# Patient Record
Sex: MALE | Race: WHITE | NOT HISPANIC OR LATINO | URBAN - METROPOLITAN AREA
[De-identification: names, ages, dates, MRNs, and addresses within clinical notes are randomized per-mention and may not be internally consistent; named-entity substitution may affect disease eponyms.]

---

## 2023-08-18 ENCOUNTER — HOSPITAL ENCOUNTER (OUTPATIENT)
Dept: DATA CONVERSION | Facility: HOSPITAL | Age: 58
Discharge: HOME | End: 2023-08-18
Payer: MEDICARE

## 2023-08-18 DIAGNOSIS — M25.572 PAIN IN LEFT ANKLE AND JOINTS OF LEFT FOOT: ICD-10-CM

## 2023-08-24 PROBLEM — E78.2 MIXED HYPERLIPIDEMIA: Status: ACTIVE | Noted: 2023-08-24

## 2023-08-24 PROBLEM — T78.40XA ALLERGIC REACTION: Status: ACTIVE | Noted: 2023-08-24

## 2023-08-24 PROBLEM — S92.919A CLOSED FRACTURE OF PHALANX OF FOOT: Status: ACTIVE | Noted: 2023-08-24

## 2023-08-24 PROBLEM — J45.909 ASTHMA (HHS-HCC): Status: ACTIVE | Noted: 2023-08-24

## 2023-08-24 PROBLEM — G56.21 CUBITAL TUNNEL SYNDROME ON RIGHT: Status: ACTIVE | Noted: 2023-08-24

## 2023-08-24 PROBLEM — S16.1XXA NECK STRAIN: Status: ACTIVE | Noted: 2023-08-24

## 2023-08-24 PROBLEM — F43.10 PTSD (POST-TRAUMATIC STRESS DISORDER): Status: ACTIVE | Noted: 2023-08-24

## 2023-08-24 PROBLEM — R10.9 ABDOMINAL PAIN: Status: ACTIVE | Noted: 2023-08-24

## 2023-08-24 PROBLEM — J81.0 ACUTE PULMONARY EDEMA (MULTI): Status: ACTIVE | Noted: 2023-08-24

## 2023-08-24 PROBLEM — H18.831 RECURRENT EROSION OF CORNEA, RIGHT EYE: Status: ACTIVE | Noted: 2023-08-24

## 2023-08-24 PROBLEM — Z96.659 ARTIFICIAL KNEE JOINT PRESENT: Status: ACTIVE | Noted: 2023-08-24

## 2023-08-24 PROBLEM — R25.2 LEG CRAMPING: Status: ACTIVE | Noted: 2023-08-24

## 2023-08-24 PROBLEM — S99.929A INJURY OF FOOT: Status: ACTIVE | Noted: 2023-08-24

## 2023-08-24 PROBLEM — K80.10 CHRONIC CALCULOUS CHOLECYSTITIS: Status: ACTIVE | Noted: 2023-08-24

## 2023-08-24 PROBLEM — M54.16 LUMBAR RADICULOPATHY: Status: ACTIVE | Noted: 2023-08-24

## 2023-08-24 PROBLEM — J95.811 IATROGENIC PNEUMOTHORAX: Status: ACTIVE | Noted: 2023-08-24

## 2023-08-24 PROBLEM — I10 ESSENTIAL HYPERTENSION: Status: ACTIVE | Noted: 2023-08-24

## 2023-08-24 PROBLEM — S61.209A OPEN WOUND OF FINGER WITH TENDON INVOLVEMENT: Status: ACTIVE | Noted: 2023-08-24

## 2023-08-24 PROBLEM — H25.813 COMBINED FORM OF SENILE CATARACT OF BOTH EYES: Status: ACTIVE | Noted: 2023-08-24

## 2023-08-24 PROBLEM — M25.521 RIGHT ELBOW PAIN: Status: ACTIVE | Noted: 2023-08-24

## 2023-08-24 PROBLEM — H18.891 CORNEAL RUST RING OF RIGHT EYE: Status: ACTIVE | Noted: 2023-08-24

## 2023-08-24 PROBLEM — E78.1 HYPERTRIGLYCERIDEMIA: Status: ACTIVE | Noted: 2023-08-24

## 2023-08-24 PROBLEM — F41.9 ACUTE ANXIETY: Status: ACTIVE | Noted: 2023-08-24

## 2023-08-24 PROBLEM — M54.32 SCIATICA, LEFT SIDE: Status: ACTIVE | Noted: 2023-08-24

## 2023-08-24 PROBLEM — G56.20 LESION OF ULNAR NERVE: Status: ACTIVE | Noted: 2023-08-24

## 2023-08-24 PROBLEM — H04.123 BILATERAL DRY EYES: Status: ACTIVE | Noted: 2023-08-24

## 2023-08-24 PROBLEM — M17.12 OSTEOARTHRITIS OF LEFT KNEE: Status: ACTIVE | Noted: 2023-08-24

## 2023-08-24 PROBLEM — B37.0 CANDIDIASIS OF MOUTH: Status: ACTIVE | Noted: 2023-08-24

## 2023-08-24 PROBLEM — M62.81 MUSCLE WEAKNESS: Status: ACTIVE | Noted: 2023-08-24

## 2023-08-24 PROBLEM — M75.121 COMPLETE TEAR OF RIGHT ROTATOR CUFF: Status: ACTIVE | Noted: 2023-08-24

## 2023-08-24 PROBLEM — R07.9 CHEST PAIN: Status: ACTIVE | Noted: 2023-08-24

## 2023-08-24 PROBLEM — H25.099 SENILE INCIPIENT CATARACT: Status: ACTIVE | Noted: 2023-08-24

## 2023-08-24 PROBLEM — E86.0 DEHYDRATION: Status: ACTIVE | Noted: 2023-08-24

## 2023-08-24 PROBLEM — J45.20 MILD INTERMITTENT ASTHMA WITHOUT COMPLICATION (HHS-HCC): Status: ACTIVE | Noted: 2023-08-24

## 2023-08-24 PROBLEM — I20.9 ANGINA PECTORIS (CMS-HCC): Status: ACTIVE | Noted: 2023-08-24

## 2023-08-24 PROBLEM — M25.511 ACUTE PAIN OF RIGHT SHOULDER: Status: ACTIVE | Noted: 2023-08-24

## 2023-08-24 PROBLEM — R42 DIZZINESS: Status: ACTIVE | Noted: 2023-08-24

## 2023-08-24 PROBLEM — R53.1 ASTHENIA: Status: ACTIVE | Noted: 2023-08-24

## 2023-08-24 PROBLEM — K21.9 GASTROESOPHAGEAL REFLUX DISEASE: Status: ACTIVE | Noted: 2023-08-24

## 2023-08-24 PROBLEM — N40.1 BENIGN LOCALIZED PROSTATIC HYPERPLASIA WITH LOWER URINARY TRACT SYMPTOMS (LUTS): Status: ACTIVE | Noted: 2023-08-24

## 2023-08-24 PROBLEM — J98.11 ATELECTASIS: Status: ACTIVE | Noted: 2023-08-24

## 2023-08-24 PROBLEM — M25.562 PAIN IN LEFT KNEE: Status: ACTIVE | Noted: 2023-08-24

## 2023-08-24 PROBLEM — M47.816 DEGENERATIVE JOINT DISEASE (DJD) OF LUMBAR SPINE: Status: ACTIVE | Noted: 2023-08-24

## 2023-08-24 PROBLEM — M51.369 DISC DEGENERATION, LUMBAR: Status: ACTIVE | Noted: 2023-08-24

## 2023-08-24 PROBLEM — M51.36 DISC DEGENERATION, LUMBAR: Status: ACTIVE | Noted: 2023-08-24

## 2023-08-24 PROBLEM — R09.89 ALTERED CARDIOPULMONARY TISSUE PERFUSION: Status: ACTIVE | Noted: 2023-08-24

## 2023-08-24 PROBLEM — E11.9 ALTERED METABOLISM DUE TO DIABETES (MULTI): Status: ACTIVE | Noted: 2023-08-24

## 2023-08-24 PROBLEM — T15.02XD FOREIGN BODY IN CORNEA, LEFT EYE, SUBSEQUENT ENCOUNTER: Status: ACTIVE | Noted: 2023-08-24

## 2023-08-24 PROBLEM — G56.01 CARPAL TUNNEL SYNDROME OF RIGHT WRIST: Status: ACTIVE | Noted: 2023-08-24

## 2023-08-24 PROBLEM — M19.021 ARTHRITIS OF RIGHT ELBOW: Status: ACTIVE | Noted: 2023-08-24

## 2023-08-24 PROBLEM — E11.40 DIABETIC NEUROPATHY (MULTI): Status: ACTIVE | Noted: 2023-08-24

## 2023-08-24 PROBLEM — I25.10 CORONARY ARTERY DISEASE: Status: ACTIVE | Noted: 2023-08-24
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
## 2023-08-24 PROBLEM — R00.2 PALPITATIONS: Status: ACTIVE | Noted: 2023-08-24

## 2023-08-24 PROBLEM — S61.012A LACERATION OF LEFT THUMB WITHOUT FOREIGN BODY WITHOUT DAMAGE TO NAIL: Status: ACTIVE | Noted: 2023-08-24

## 2023-08-24 PROBLEM — N49.3: Status: ACTIVE | Noted: 2023-08-24

## 2023-08-24 PROBLEM — I73.9 PERIPHERAL VASCULAR DISEASE (CMS-HCC): Status: ACTIVE | Noted: 2023-08-24

## 2023-08-24 PROBLEM — M75.111 INCOMPLETE TEAR OF RIGHT ROTATOR CUFF: Status: ACTIVE | Noted: 2023-08-24

## 2023-08-24 PROBLEM — E11.00 HYPEROSMOLAR NON-KETOTIC STATE DUE TO TYPE 2 DIABETES MELLITUS (MULTI): Status: ACTIVE | Noted: 2023-08-24

## 2023-08-24 PROBLEM — G56.21 ENTRAPMENT OF RIGHT ULNAR NERVE: Status: ACTIVE | Noted: 2023-08-24

## 2023-08-24 RX ORDER — DOCUSATE SODIUM 100 MG/1
1 CAPSULE, LIQUID FILLED ORAL DAILY PRN
COMMUNITY
End: 2023-11-17

## 2023-08-24 RX ORDER — ESOMEPRAZOLE MAGNESIUM 40 MG/1
CAPSULE, DELAYED RELEASE ORAL
COMMUNITY
End: 2023-11-17

## 2023-08-24 RX ORDER — PIOGLITAZONEHYDROCHLORIDE 30 MG/1
1 TABLET ORAL DAILY
COMMUNITY

## 2023-08-24 RX ORDER — IBUPROFEN 800 MG/1
1 TABLET ORAL 3 TIMES DAILY PRN
COMMUNITY
End: 2024-05-31

## 2023-08-24 RX ORDER — DULOXETIN HYDROCHLORIDE 60 MG/1
1 CAPSULE, DELAYED RELEASE ORAL DAILY
COMMUNITY
Start: 2018-07-10

## 2023-08-24 RX ORDER — ISOSORBIDE MONONITRATE 30 MG/1
TABLET, EXTENDED RELEASE ORAL
COMMUNITY
Start: 2018-01-12 | End: 2023-11-17

## 2023-08-24 RX ORDER — FENOFIBRATE 160 MG/1
TABLET ORAL
COMMUNITY
End: 2023-11-17

## 2023-08-24 RX ORDER — NYSTATIN 100000 [USP'U]/ML
4 SUSPENSION ORAL 4 TIMES DAILY
COMMUNITY
Start: 2021-06-03 | End: 2023-11-17

## 2023-08-24 RX ORDER — LIRAGLUTIDE 6 MG/ML
1.8 INJECTION SUBCUTANEOUS DAILY
COMMUNITY
End: 2023-10-26

## 2023-08-24 RX ORDER — METFORMIN HYDROCHLORIDE EXTENDED-RELEASE TABLETS 500 MG/1
TABLET, FILM COATED, EXTENDED RELEASE ORAL
COMMUNITY
End: 2023-11-06 | Stop reason: SINTOL

## 2023-08-24 RX ORDER — NITROGLYCERIN 40 MG/1
1 PATCH TRANSDERMAL DAILY
COMMUNITY
End: 2023-11-06 | Stop reason: ALTCHOICE

## 2023-08-24 RX ORDER — CARVEDILOL 12.5 MG/1
TABLET ORAL
COMMUNITY
Start: 2018-01-12 | End: 2023-11-17

## 2023-08-24 RX ORDER — SUMATRIPTAN 50 MG/1
1 TABLET, FILM COATED ORAL DAILY PRN
COMMUNITY

## 2023-08-24 RX ORDER — NITROGLYCERIN 0.4 MG/1
1 TABLET SUBLINGUAL AS NEEDED
COMMUNITY
End: 2024-05-31 | Stop reason: SDUPTHER

## 2023-08-24 RX ORDER — LOSARTAN POTASSIUM 50 MG/1
1 TABLET ORAL DAILY
COMMUNITY
End: 2023-11-17

## 2023-08-24 RX ORDER — ROSUVASTATIN CALCIUM 40 MG/1
1 TABLET, COATED ORAL DAILY
COMMUNITY
End: 2024-05-31

## 2023-08-24 RX ORDER — ICOSAPENT ETHYL 1 G/1
2 CAPSULE ORAL 2 TIMES DAILY
COMMUNITY
Start: 2021-12-05 | End: 2023-11-17

## 2023-08-24 RX ORDER — GABAPENTIN 600 MG/1
1 TABLET ORAL DAILY
COMMUNITY

## 2023-08-24 RX ORDER — OMEPRAZOLE 40 MG/1
1 CAPSULE, DELAYED RELEASE ORAL DAILY
COMMUNITY
End: 2023-11-22 | Stop reason: SDUPTHER

## 2023-08-24 RX ORDER — PHENYTOIN SODIUM 100 MG/1
1 CAPSULE, EXTENDED RELEASE ORAL NIGHTLY
COMMUNITY
Start: 2018-06-04

## 2023-08-24 RX ORDER — ALBUTEROL SULFATE 90 UG/1
2 AEROSOL, METERED RESPIRATORY (INHALATION) EVERY 4 HOURS PRN
COMMUNITY
End: 2024-01-24

## 2023-08-24 RX ORDER — NEOMYCIN SULFATE 500 MG/1
TABLET ORAL
COMMUNITY
Start: 2017-11-16 | End: 2023-11-17

## 2023-08-24 RX ORDER — ATORVASTATIN CALCIUM 40 MG/1
1 TABLET, FILM COATED ORAL DAILY
COMMUNITY
End: 2023-12-14 | Stop reason: WASHOUT

## 2023-08-24 RX ORDER — ATENOLOL AND CHLORTHALIDONE TABLET 100; 25 MG/1; MG/1
1 TABLET ORAL DAILY
COMMUNITY
End: 2024-04-29

## 2023-08-24 RX ORDER — OXYCODONE AND ACETAMINOPHEN 5; 325 MG/1; MG/1
TABLET ORAL
COMMUNITY
Start: 2017-09-20 | End: 2023-11-17

## 2023-08-24 RX ORDER — HYDROXYZINE PAMOATE 25 MG/1
1 CAPSULE ORAL EVERY 8 HOURS PRN
COMMUNITY
Start: 2021-11-30 | End: 2023-11-17

## 2023-08-24 RX ORDER — TAMSULOSIN HYDROCHLORIDE 0.4 MG/1
1 CAPSULE ORAL DAILY
COMMUNITY

## 2023-08-24 RX ORDER — METOPROLOL TARTRATE 25 MG/1
TABLET, FILM COATED ORAL
COMMUNITY
Start: 2017-09-21 | End: 2023-11-17

## 2023-08-24 RX ORDER — FLASH GLUCOSE SCANNING READER
EACH MISCELLANEOUS EVERY 24 HOURS
COMMUNITY
Start: 2021-02-01 | End: 2023-11-17

## 2023-08-24 RX ORDER — METHOCARBAMOL 750 MG/1
1 TABLET, FILM COATED ORAL EVERY 4 HOURS PRN
COMMUNITY

## 2023-08-24 RX ORDER — INSULIN GLARGINE 100 [IU]/ML
90 INJECTION, SOLUTION SUBCUTANEOUS NIGHTLY
COMMUNITY

## 2023-08-24 RX ORDER — TESTOSTERONE 10 MG/G
GEL TOPICAL
COMMUNITY
End: 2023-11-17

## 2023-08-24 RX ORDER — CYCLOBENZAPRINE HCL 10 MG
0.5 TABLET ORAL NIGHTLY
COMMUNITY
Start: 2017-02-28 | End: 2023-11-17

## 2023-08-24 RX ORDER — INSULIN LISPRO 100 [IU]/ML
INJECTION, SOLUTION INTRAVENOUS; SUBCUTANEOUS
COMMUNITY
Start: 2017-11-16 | End: 2023-11-17

## 2023-08-24 RX ORDER — QUETIAPINE FUMARATE 100 MG/1
1 TABLET, FILM COATED ORAL DAILY
COMMUNITY

## 2023-08-24 RX ORDER — BLOOD-GLUCOSE,RECEIVER,CONT
EACH MISCELLANEOUS
COMMUNITY
Start: 2023-01-31

## 2023-08-24 RX ORDER — MELOXICAM 15 MG/1
TABLET ORAL
COMMUNITY
Start: 2017-11-16 | End: 2023-11-17

## 2023-08-24 RX ORDER — ACETAMINOPHEN AND CODEINE PHOSPHATE 300; 60 MG/1; MG/1
1 TABLET ORAL EVERY 8 HOURS PRN
COMMUNITY
Start: 2016-09-01 | End: 2023-11-17

## 2023-08-24 RX ORDER — OMEGA-3-ACID ETHYL ESTERS 1 G/1
CAPSULE, LIQUID FILLED ORAL
COMMUNITY

## 2023-08-24 RX ORDER — EXENATIDE 250 UG/ML
INJECTION SUBCUTANEOUS
COMMUNITY
Start: 2017-11-06 | End: 2023-11-17

## 2023-08-24 RX ORDER — BISACODYL 5 MG
TABLET, DELAYED RELEASE (ENTERIC COATED) ORAL
COMMUNITY
Start: 2017-11-16 | End: 2023-12-14 | Stop reason: WASHOUT

## 2023-08-24 RX ORDER — ASPIRIN 81 MG/1
1 TABLET ORAL DAILY
COMMUNITY
Start: 2018-07-10

## 2023-08-24 RX ORDER — METRONIDAZOLE 500 MG/1
TABLET ORAL
COMMUNITY
Start: 2017-11-16 | End: 2023-11-17

## 2023-08-24 RX ORDER — PEN NEEDLE, DIABETIC 29 G X1/2"
NEEDLE, DISPOSABLE MISCELLANEOUS EVERY 24 HOURS
COMMUNITY
Start: 2020-01-13 | End: 2023-12-01

## 2023-08-24 RX ORDER — INSULIN ASPART 100 [IU]/ML
INJECTION, SOLUTION INTRAVENOUS; SUBCUTANEOUS
COMMUNITY
Start: 2022-06-08 | End: 2024-04-08

## 2023-08-24 RX ORDER — BLOOD-GLUCOSE METER
KIT MISCELLANEOUS
COMMUNITY
Start: 2021-02-02 | End: 2023-11-17

## 2023-08-24 RX ORDER — FUROSEMIDE 40 MG/1
TABLET ORAL
COMMUNITY
Start: 2018-01-12 | End: 2023-11-17

## 2023-09-06 ENCOUNTER — HOSPITAL ENCOUNTER (OUTPATIENT)
Dept: DATA CONVERSION | Facility: HOSPITAL | Age: 58
Discharge: HOME | End: 2023-09-06
Payer: MEDICARE

## 2023-09-06 DIAGNOSIS — E78.2 MIXED HYPERLIPIDEMIA: ICD-10-CM

## 2023-09-06 DIAGNOSIS — E11.9 TYPE 2 DIABETES MELLITUS WITHOUT COMPLICATIONS (MULTI): ICD-10-CM

## 2023-09-06 DIAGNOSIS — I10 ESSENTIAL (PRIMARY) HYPERTENSION: ICD-10-CM

## 2023-09-06 DIAGNOSIS — Z12.5 ENCOUNTER FOR SCREENING FOR MALIGNANT NEOPLASM OF PROSTATE: ICD-10-CM

## 2023-09-06 LAB
ALBUMIN SERPL-MCNC: 4.5 GM/DL (ref 3.5–5)
ALBUMIN/GLOB SERPL: 1.7 RATIO (ref 1.5–3)
ALP BLD-CCNC: 99 U/L (ref 35–125)
ALT SERPL-CCNC: 16 U/L (ref 5–40)
ANION GAP SERPL CALCULATED.3IONS-SCNC: 12 MMOL/L (ref 0–19)
APPEARANCE PLAS: ABNORMAL
AST SERPL-CCNC: 14 U/L (ref 5–40)
BILIRUB SERPL-MCNC: 0.2 MG/DL (ref 0.1–1.2)
BUN SERPL-MCNC: 16 MG/DL (ref 8–25)
BUN/CREAT SERPL: 17.8 RATIO (ref 8–21)
CALCIUM SERPL-MCNC: 9.7 MG/DL (ref 8.5–10.4)
CHLORIDE SERPL-SCNC: 92 MMOL/L (ref 97–107)
CHOLEST SERPL-MCNC: 139 MG/DL (ref 133–200)
CHOLEST/HDLC SERPL: 3.5 RATIO
CO2 SERPL-SCNC: 27 MMOL/L (ref 24–31)
COLOR SPUN FLD: ABNORMAL
CREAT SERPL-MCNC: 0.9 MG/DL (ref 0.4–1.6)
CREAT UR-MCNC: 44.3 MG/DL
FASTING STATUS PATIENT QL REPORTED: ABNORMAL
GFR SERPL CREATININE-BSD FRML MDRD: 100 ML/MIN/1.73 M2
GLOBULIN SER-MCNC: 2.7 G/DL (ref 1.9–3.7)
GLUCOSE SERPL-MCNC: 313 MG/DL (ref 65–99)
HBA1C MFR BLD: 9.6 % (ref 4–6)
HDLC SERPL-MCNC: 40 MG/DL
LDLC SERPL CALC-MCNC: ABNORMAL MG/DL (ref 65–130)
LDLC SERPL DIRECT ASSAY-MCNC: 40 MG/DL (ref 60–129)
MICROALBUMIN UR-MCNC: 12 MG/L (ref 0–23)
MICROALBUMIN/CREAT UR: 27.1 MG/G (ref 0–30)
POTASSIUM SERPL-SCNC: 4 MMOL/L (ref 3.4–5.1)
PROT SERPL-MCNC: 7.2 G/DL (ref 5.9–7.9)
PSA SERPL-MCNC: 2 NG/ML (ref 0–4.1)
SODIUM SERPL-SCNC: 131 MMOL/L (ref 133–145)
TRIGL SERPL-MCNC: 486 MG/DL (ref 40–150)
VIT B12 SERPL-MCNC: 791 PG/ML (ref 211–946)

## 2023-09-12 ENCOUNTER — HOSPITAL ENCOUNTER (OUTPATIENT)
Dept: DATA CONVERSION | Facility: HOSPITAL | Age: 58
Discharge: HOME | End: 2023-09-12
Payer: MEDICARE

## 2023-09-12 DIAGNOSIS — M47.817 SPONDYLOSIS WITHOUT MYELOPATHY OR RADICULOPATHY, LUMBOSACRAL REGION: ICD-10-CM

## 2023-09-18 ENCOUNTER — HOSPITAL ENCOUNTER (OUTPATIENT)
Dept: DATA CONVERSION | Facility: HOSPITAL | Age: 58
End: 2023-09-18

## 2023-09-18 DIAGNOSIS — M47.817 SPONDYLOSIS WITHOUT MYELOPATHY OR RADICULOPATHY, LUMBOSACRAL REGION: ICD-10-CM

## 2023-09-21 ENCOUNTER — HOSPITAL ENCOUNTER (OUTPATIENT)
Dept: DATA CONVERSION | Facility: HOSPITAL | Age: 58
Discharge: HOME | End: 2023-09-21
Payer: MEDICARE

## 2023-09-21 DIAGNOSIS — Z12.11 ENCOUNTER FOR SCREENING FOR MALIGNANT NEOPLASM OF COLON: ICD-10-CM

## 2023-09-25 ENCOUNTER — HOSPITAL ENCOUNTER (OUTPATIENT)
Dept: DATA CONVERSION | Facility: HOSPITAL | Age: 58
Discharge: HOME | End: 2023-09-25
Payer: MEDICARE

## 2023-09-25 DIAGNOSIS — M47.817 SPONDYLOSIS WITHOUT MYELOPATHY OR RADICULOPATHY, LUMBOSACRAL REGION: ICD-10-CM

## 2023-09-27 ENCOUNTER — HOSPITAL ENCOUNTER (OUTPATIENT)
Dept: DATA CONVERSION | Facility: HOSPITAL | Age: 58
Discharge: HOME | End: 2023-09-27
Payer: MEDICARE

## 2023-09-27 DIAGNOSIS — Z12.11 ENCOUNTER FOR SCREENING FOR MALIGNANT NEOPLASM OF COLON: ICD-10-CM

## 2023-09-27 LAB — GLUCOSE BLD STRIP.AUTO-MCNC: 162 MG/DL (ref 65–99)

## 2023-10-09 ENCOUNTER — TREATMENT (OUTPATIENT)
Dept: PHYSICAL THERAPY | Facility: CLINIC | Age: 58
End: 2023-10-09
Payer: MEDICARE

## 2023-10-09 DIAGNOSIS — M47.817 SPONDYLOSIS WITHOUT MYELOPATHY OR RADICULOPATHY, LUMBOSACRAL REGION: ICD-10-CM

## 2023-10-09 PROCEDURE — 97110 THERAPEUTIC EXERCISES: CPT | Mod: CQ,GP

## 2023-10-09 PROCEDURE — 97530 THERAPEUTIC ACTIVITIES: CPT | Mod: CQ,GP

## 2023-10-09 ASSESSMENT — PAIN SCALES - GENERAL: PAINLEVEL_OUTOF10: 6

## 2023-10-09 ASSESSMENT — PAIN DESCRIPTION - DESCRIPTORS: DESCRIPTORS: DULL

## 2023-10-09 ASSESSMENT — PAIN - FUNCTIONAL ASSESSMENT: PAIN_FUNCTIONAL_ASSESSMENT: 0-10

## 2023-10-09 NOTE — PROGRESS NOTES
"Physical Therapy    Physical Therapy Treatment    Patient Name: Orlin Laguerre  MRN: [id number]  Today's Date: 10/9/2023  Time Calculation  Start Time: 1515  Stop Time: 1602  Time Calculation (min): 47 minVisit 2/6      Assessment:  PT Assessment  PT Assessment Results: Decreased strength, Decreased range of motion, Pain  Assessment Comment: Pt tolerates ther ex progressions as noted with moderately reduced pain level (2/10)        Current Problem  1. Spondylosis without myelopathy or radiculopathy, lumbosacral region  PT eval and treat    Follow Up In Physical Therapy          Subjective   Pt reports near constant LB pain and occasional LE weakness B this visit.    Pain  Pain Assessment: 0-10  Pain Score: 6  Pain Location: Back  Pain Orientation: Lower  Pain Descriptors: Dull  Pain Frequency: Constant/continuous  Pain Onset: Ongoing  Clinical Progression: Not changed  Patient's Stated Pain Goal: 3  Pain Interventions: Heat applied, Medication (See MAR), Home medication  Response to Interventions: Mild reduction of S/S.6/10    Objective   Pt arrives with antalgic gait, mild flexed posture, requires moderate cueing for inclusion of core musculature during there ex progressions.    Treatments:  Therapeutic Exercise  Therapeutic Exercise Performed: Yes  Therapeutic Exercise Activity 1: Seated hamstring stretches  Therapeutic Exercise Activity 2: Seated Isometric abdominal brace 2x10  Therapeutic Exercise Activity 3: SeatedIsometric abdominal brace with pelvic floor 2x10  Therapeutic Exercise Activity 4: Seated Isometric abdominal brace with hip ADD 2x10  Therapeutic Exercise Activity 5: Seated isometric abdominal brace with hip ABD BTB 2x10  Therapeutic Exercise Activity 6: Seated with lumbar support 4'  Therapeutic Exercise Activity 7: Standing lumbar extension against wall 5\" hold x10    Therapeutic Activity  Therapeutic Activity Performed: Yes  Therapeutic Activity 1: Discussed use and importance of core " musculature in protection of spine, use of lumbar supports, maintaining neutral spine when seated.  Activity:  Endurance:  (Tolerates 30+ minutes of activities with occasional breaks.)    Goals:   Pt will improve LE flexibility to WNL to improve I/ADLs.  Pt will sleep through night without pain awakening him.  Pt will stand/walk >30 min with <3/10 pain.  Pt will perform housework with <3/10 pain.

## 2023-10-12 ENCOUNTER — OFFICE VISIT (OUTPATIENT)
Dept: PAIN MEDICINE | Facility: CLINIC | Age: 58
End: 2023-10-12
Payer: MEDICARE

## 2023-10-12 ENCOUNTER — HOSPITAL ENCOUNTER (OUTPATIENT)
Dept: RADIOLOGY | Facility: HOSPITAL | Age: 58
Discharge: HOME | End: 2023-10-12
Payer: MEDICARE

## 2023-10-12 VITALS
RESPIRATION RATE: 16 BRPM | BODY MASS INDEX: 30.8 KG/M2 | SYSTOLIC BLOOD PRESSURE: 104 MMHG | DIASTOLIC BLOOD PRESSURE: 68 MMHG | WEIGHT: 220 LBS | HEIGHT: 71 IN

## 2023-10-12 DIAGNOSIS — M54.16 LUMBAR RADICULOPATHY: ICD-10-CM

## 2023-10-12 DIAGNOSIS — M47.812 CERVICAL SPONDYLOSIS WITHOUT MYELOPATHY: ICD-10-CM

## 2023-10-12 DIAGNOSIS — M47.817 LUMBOSACRAL SPONDYLOSIS WITHOUT MYELOPATHY: ICD-10-CM

## 2023-10-12 DIAGNOSIS — M48.062 SPINAL STENOSIS OF LUMBAR REGION WITH NEUROGENIC CLAUDICATION: Primary | ICD-10-CM

## 2023-10-12 PROCEDURE — 3074F SYST BP LT 130 MM HG: CPT | Performed by: ANESTHESIOLOGY

## 2023-10-12 PROCEDURE — 99214 OFFICE O/P EST MOD 30 MIN: CPT | Performed by: ANESTHESIOLOGY

## 2023-10-12 PROCEDURE — 3046F HEMOGLOBIN A1C LEVEL >9.0%: CPT | Performed by: ANESTHESIOLOGY

## 2023-10-12 PROCEDURE — 4010F ACE/ARB THERAPY RXD/TAKEN: CPT | Performed by: ANESTHESIOLOGY

## 2023-10-12 PROCEDURE — 72050 X-RAY EXAM NECK SPINE 4/5VWS: CPT

## 2023-10-12 PROCEDURE — 3078F DIAST BP <80 MM HG: CPT | Performed by: ANESTHESIOLOGY

## 2023-10-12 ASSESSMENT — PATIENT HEALTH QUESTIONNAIRE - PHQ9
1. LITTLE INTEREST OR PLEASURE IN DOING THINGS: NOT AT ALL
SUM OF ALL RESPONSES TO PHQ9 QUESTIONS 1 AND 2: 0
2. FEELING DOWN, DEPRESSED OR HOPELESS: NOT AT ALL

## 2023-10-12 ASSESSMENT — ENCOUNTER SYMPTOMS
ENDOCRINE NEGATIVE: 1
CONSTITUTIONAL NEGATIVE: 1
WEAKNESS: 0
PSYCHIATRIC NEGATIVE: 1
BACK PAIN: 1
ALLERGIC/IMMUNOLOGIC NEGATIVE: 1
ARTHRALGIAS: 1
NECK PAIN: 1
EYES NEGATIVE: 1
CARDIOVASCULAR NEGATIVE: 1
HEMATOLOGIC/LYMPHATIC NEGATIVE: 1
NECK STIFFNESS: 1
RESPIRATORY NEGATIVE: 1
GASTROINTESTINAL NEGATIVE: 1
NEUROLOGICAL NEGATIVE: 1

## 2023-10-12 ASSESSMENT — PAIN - FUNCTIONAL ASSESSMENT: PAIN_FUNCTIONAL_ASSESSMENT: 0-10

## 2023-10-12 ASSESSMENT — PAIN DESCRIPTION - DESCRIPTORS: DESCRIPTORS: ACHING;NUMBNESS;TINGLING

## 2023-10-12 ASSESSMENT — LIFESTYLE VARIABLES: TOTAL SCORE: 0

## 2023-10-12 ASSESSMENT — PAIN SCALES - GENERAL
PAINLEVEL: 7
PAINLEVEL_OUTOF10: 7

## 2023-10-12 NOTE — PROGRESS NOTES
Subjective   Patient ID: Orlin Laguerre is a 58 y.o. male who presents for Back Pain.  Back Pain  Pertinent negatives include no weakness.  Patient here today for follow up today to go over his new lumbar MRI.  Patient continues to suffer with low back and lower extremity pain with standing and walking which improves with sitting down.  He is open for intervention.  He has a standing tolerance of about 15 mins at this time.   He also is having non-radiating neck pain.  Her states with ROM of his neck he will have cracking sensation and pain with ROM.   He has tried OTC medications including NSAIDS which helps a small amount.  He would like to start working up his neck now as well.  He has been doing exercises on his neck that he learned in PT and it is not helpful.      Review of Systems   Constitutional: Negative.    HENT: Negative.     Eyes: Negative.    Respiratory: Negative.     Cardiovascular: Negative.    Gastrointestinal: Negative.    Endocrine: Negative.    Genitourinary: Negative.    Musculoskeletal:  Positive for arthralgias, back pain, neck pain and neck stiffness.   Skin: Negative.    Allergic/Immunologic: Negative.    Neurological: Negative.  Negative for weakness.   Hematological: Negative.    Psychiatric/Behavioral: Negative.       Objective   Physical Exam  Vitals and nursing note reviewed.   Constitutional:       Appearance: Normal appearance.   HENT:      Head: Normocephalic and atraumatic.      Right Ear: Ear canal and external ear normal.      Left Ear: Ear canal and external ear normal.      Nose: Nose normal.      Mouth/Throat:      Mouth: Mucous membranes are moist.      Pharynx: Oropharynx is clear.   Eyes:      Conjunctiva/sclera: Conjunctivae normal.      Pupils: Pupils are equal, round, and reactive to light.   Cardiovascular:      Rate and Rhythm: Normal rate.   Pulmonary:      Effort: Pulmonary effort is normal. No respiratory distress.   Musculoskeletal:      Cervical back: Neck  supple. Tenderness and crepitus present. Pain with movement present. Decreased range of motion.      Lumbar back: No tenderness. Decreased range of motion. Negative right straight leg raise test and negative left straight leg raise test.   Skin:     General: Skin is warm and dry.   Neurological:      Mental Status: He is alert.   Psychiatric:         Mood and Affect: Mood normal.         Thought Content: Thought content normal.     Assessment/Plan   Problem List Items Addressed This Visit             ICD-10-CM       Neuro    Lumbar radiculopathy M54.16    Relevant Orders    Epidural Steroid Injection     Other Visit Diagnoses         Codes    Spinal stenosis of lumbar region with neurogenic claudication    -  Primary M48.062    Relevant Orders    Epidural Steroid Injection    Cervical spondylosis without myelopathy     M47.812    Relevant Orders    XR cervical spine complete 4-5 views          I nice discussion with the patient today our plan will be as follows.    Radiology: I went over the patient's MRI with him today.  He does suffer with mild to moderate central canal stenosis at L2-3 secondary to spondylosis and ligamentum flavum hypertrophy.  He has varying degrees of spondylosis throughout the lumbar spine there is a grade 1 listhesis of L5 on S1.  There is foraminal narrowing left side greater than right at L5-S1.  There is moderate to severe center of disc disease at L5-S1.  There is mild central canal stenosis at L4-5 with mild to moderate foraminal narrowing at L4-5.  Patient to have cervical x-rays completed.  Physically:  Continue with home exercises.    Psychologically:  no issues at this time.     Medication: no changes at this time.     Duration:  > 1 year    Intervention: Patient is an excellent candidate for L2-3 interlaminar epidural steroid injection under fluoroscopic guidance.  Risks, benefit, and alternatives of the procedure were discussed with the patient.  Oswestry score has been compelted  and recorded.

## 2023-10-13 DIAGNOSIS — M47.817 SPONDYLOSIS WITHOUT MYELOPATHY OR RADICULOPATHY, LUMBOSACRAL REGION: Primary | ICD-10-CM

## 2023-10-16 ENCOUNTER — OFFICE VISIT (OUTPATIENT)
Dept: ORTHOPEDIC SURGERY | Facility: CLINIC | Age: 58
End: 2023-10-16
Payer: MEDICARE

## 2023-10-16 DIAGNOSIS — M75.121 COMPLETE TEAR OF RIGHT ROTATOR CUFF, UNSPECIFIED WHETHER TRAUMATIC: Primary | ICD-10-CM

## 2023-10-16 PROCEDURE — 99213 OFFICE O/P EST LOW 20 MIN: CPT | Performed by: ORTHOPAEDIC SURGERY

## 2023-10-16 PROCEDURE — 4010F ACE/ARB THERAPY RXD/TAKEN: CPT | Performed by: ORTHOPAEDIC SURGERY

## 2023-10-16 PROCEDURE — 3046F HEMOGLOBIN A1C LEVEL >9.0%: CPT | Performed by: ORTHOPAEDIC SURGERY

## 2023-10-16 ASSESSMENT — PAIN - FUNCTIONAL ASSESSMENT: PAIN_FUNCTIONAL_ASSESSMENT: 0-10

## 2023-10-16 ASSESSMENT — PAIN SCALES - GENERAL: PAINLEVEL_OUTOF10: 6
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
## 2023-10-18 ENCOUNTER — TELEPHONE (OUTPATIENT)
Dept: PAIN MEDICINE | Facility: CLINIC | Age: 58
End: 2023-10-18
Payer: MEDICARE

## 2023-10-18 ENCOUNTER — APPOINTMENT (OUTPATIENT)
Dept: PHYSICAL THERAPY | Facility: CLINIC | Age: 58
End: 2023-10-18
Payer: MEDICARE

## 2023-10-18 ENCOUNTER — DOCUMENTATION (OUTPATIENT)
Dept: PHYSICAL THERAPY | Facility: CLINIC | Age: 58
End: 2023-10-18
Payer: MEDICARE

## 2023-10-18 NOTE — PROGRESS NOTES
Physical Therapy                 Therapy Communication Note    Patient Name: Orlin Laguerre  MRN: [id number]  Today's Date: 10/18/2023     Discipline: Physical Therapy    Missed Visit Reason:      Missed Time: Cancel    Comment:

## 2023-10-24 ENCOUNTER — TELEPHONE (OUTPATIENT)
Dept: PAIN MEDICINE | Facility: CLINIC | Age: 58
End: 2023-10-24
Payer: MEDICARE

## 2023-10-24 DIAGNOSIS — E11.9 ALTERED METABOLISM DUE TO DIABETES (MULTI): Primary | ICD-10-CM

## 2023-10-25 ENCOUNTER — TREATMENT (OUTPATIENT)
Dept: PHYSICAL THERAPY | Facility: CLINIC | Age: 58
End: 2023-10-25
Payer: MEDICARE

## 2023-10-25 DIAGNOSIS — M47.817 SPONDYLOSIS WITHOUT MYELOPATHY OR RADICULOPATHY, LUMBOSACRAL REGION: ICD-10-CM

## 2023-10-25 PROCEDURE — 97110 THERAPEUTIC EXERCISES: CPT | Mod: GP,CQ

## 2023-10-25 ASSESSMENT — PAIN SCALES - GENERAL: PAINLEVEL_OUTOF10: 7

## 2023-10-25 NOTE — PROGRESS NOTES
"Physical Therapy    Physical Therapy Treatment    Patient Name: Orlin Laguerre  MRN: [id number]  Today's Date: 10/25/2023  Time Calculation  Start Time: 1345  Stop Time: 1428  Time Calculation (min): 43 min  Visit 2/5    Assessment:  PT Assessment  PT Assessment Results: Decreased strength, Decreased range of motion, Pain  Evaluation/Treatment Tolerance: Patient tolerated treatment well  Assessment Comment: Pt tolerates prone lying and FAVIAN activites with recued LB S/S, B LE pain persists without change.    Plan:  OP PT Plan  Treatment/Interventions:  (HEP,Body Mechanics,Posture,(5) Strengthening,Stretching,Neuromuscular,Balance/Proprio..,Gait Training,Mechanical Traction,Thermal,ROM/Stretching,Joint Mobilization,Myofascial Release,Massage,Transfer training,Stair ambulation,Gait training)  PT Plan: Skilled PT  PT Frequency: 1 time per week  Duration: 5 weeks  Number of Treatments Authorized: 6  Plan of Care Agreement: Patient    Current Problem  1. Spondylosis without myelopathy or radiculopathy, lumbosacral region  Follow Up In Physical Therapy          Subjective   Pt reports falling on B knees yesterday, C/O B patellar pain R>L.    Precautions  Precautions  Precautions Comment: Min to Mod fall risk    Pain  Pain Score: 7  Pain Location: Back  Pain Orientation: Lower  Pain Frequency: Constant/continuous  Pain Onset: Ongoing  Clinical Progression: Other (Comment)    Objective   Small, pink skin \"scrape\" on R knee, no visible skin disturbance on L  knee.      Treatments:  Therapeutic Exercise  Therapeutic Exercise Performed: Yes  Therapeutic Exercise Activity 1: Seated hamstring stretches  Therapeutic Exercise Activity 2: Seated Isometric abdominal brace 2x10  Therapeutic Exercise Activity 3: SeatedIsometric abdominal brace with pelvic floor 2x10  Therapeutic Exercise Activity 4: Seated Isometric abdominal brace with hip ADD 2x10  Therapeutic Exercise Activity 5: Seated isometric abdominal brace with hip ABD BTB " "2x10  Therapeutic Exercise Activity 6: Seated with lumbar support 4'  Therapeutic Exercise Activity 7: Standing lumbar extension against wall 5\" hold x10  Therapeutic Exercise Activity 8: Prone 2'  Therapeutic Exercise Activity 9: FAVIAN 3'  Activity:  Endurance: Tolerates 30+ min exercise without fatigue    OP EDUCATION:  Education  Individual(s) Educated: Patient  Education Provided: Body Mechanics, Home Exercise Program  Patient Response to Education: Patient/Caregiver Verbalized Understanding of Information    Goals:  See eval     "

## 2023-10-26 RX ORDER — LIRAGLUTIDE 6 MG/ML
INJECTION SUBCUTANEOUS
Qty: 27 ML | Refills: 10 | Status: SHIPPED | OUTPATIENT
Start: 2023-10-26

## 2023-11-01 ENCOUNTER — APPOINTMENT (OUTPATIENT)
Dept: PHYSICAL THERAPY | Facility: CLINIC | Age: 58
End: 2023-11-01
Payer: MEDICARE

## 2023-11-06 ENCOUNTER — OFFICE VISIT (OUTPATIENT)
Dept: PRIMARY CARE | Facility: CLINIC | Age: 58
End: 2023-11-06
Payer: MEDICARE

## 2023-11-06 ENCOUNTER — ANCILLARY PROCEDURE (OUTPATIENT)
Dept: RADIOLOGY | Facility: CLINIC | Age: 58
End: 2023-11-06
Payer: MEDICARE

## 2023-11-06 VITALS
OXYGEN SATURATION: 93 % | WEIGHT: 220 LBS | DIASTOLIC BLOOD PRESSURE: 80 MMHG | SYSTOLIC BLOOD PRESSURE: 122 MMHG | HEART RATE: 80 BPM | BODY MASS INDEX: 30.8 KG/M2 | HEIGHT: 71 IN | RESPIRATION RATE: 19 BRPM

## 2023-11-06 DIAGNOSIS — M25.561 ACUTE PAIN OF RIGHT KNEE: Primary | ICD-10-CM

## 2023-11-06 DIAGNOSIS — M25.561 ACUTE PAIN OF RIGHT KNEE: ICD-10-CM

## 2023-11-06 PROCEDURE — 99213 OFFICE O/P EST LOW 20 MIN: CPT | Performed by: STUDENT IN AN ORGANIZED HEALTH CARE EDUCATION/TRAINING PROGRAM

## 2023-11-06 PROCEDURE — 99213 OFFICE O/P EST LOW 20 MIN: CPT | Mod: GC | Performed by: STUDENT IN AN ORGANIZED HEALTH CARE EDUCATION/TRAINING PROGRAM

## 2023-11-06 PROCEDURE — 4010F ACE/ARB THERAPY RXD/TAKEN: CPT | Performed by: STUDENT IN AN ORGANIZED HEALTH CARE EDUCATION/TRAINING PROGRAM

## 2023-11-06 PROCEDURE — 73564 X-RAY EXAM KNEE 4 OR MORE: CPT | Mod: RT,FY

## 2023-11-06 PROCEDURE — 3046F HEMOGLOBIN A1C LEVEL >9.0%: CPT | Performed by: STUDENT IN AN ORGANIZED HEALTH CARE EDUCATION/TRAINING PROGRAM

## 2023-11-06 PROCEDURE — 3074F SYST BP LT 130 MM HG: CPT | Performed by: STUDENT IN AN ORGANIZED HEALTH CARE EDUCATION/TRAINING PROGRAM

## 2023-11-06 PROCEDURE — 3079F DIAST BP 80-89 MM HG: CPT | Performed by: STUDENT IN AN ORGANIZED HEALTH CARE EDUCATION/TRAINING PROGRAM

## 2023-11-06 ASSESSMENT — PAIN SCALES - GENERAL: PAINLEVEL: 7

## 2023-11-06 ASSESSMENT — PATIENT HEALTH QUESTIONNAIRE - PHQ9
1. LITTLE INTEREST OR PLEASURE IN DOING THINGS: NOT AT ALL
2. FEELING DOWN, DEPRESSED OR HOPELESS: NOT AT ALL
SUM OF ALL RESPONSES TO PHQ9 QUESTIONS 1 AND 2: 0

## 2023-11-06 ASSESSMENT — ENCOUNTER SYMPTOMS
LOSS OF SENSATION IN FEET: 0
OCCASIONAL FEELINGS OF UNSTEADINESS: 1
DEPRESSION: 0

## 2023-11-07 ENCOUNTER — TELEPHONE (OUTPATIENT)
Dept: PRIMARY CARE | Facility: CLINIC | Age: 58
End: 2023-11-07
Payer: MEDICARE

## 2023-11-08 ENCOUNTER — TREATMENT (OUTPATIENT)
Dept: PHYSICAL THERAPY | Facility: CLINIC | Age: 58
End: 2023-11-08
Payer: MEDICARE

## 2023-11-08 DIAGNOSIS — M47.817 SPONDYLOSIS WITHOUT MYELOPATHY OR RADICULOPATHY, LUMBOSACRAL REGION: ICD-10-CM

## 2023-11-08 PROCEDURE — 97110 THERAPEUTIC EXERCISES: CPT | Mod: GP,CQ

## 2023-11-08 PROCEDURE — 97012 MECHANICAL TRACTION THERAPY: CPT | Mod: GP,CQ

## 2023-11-08 ASSESSMENT — PAIN SCALES - GENERAL: PAINLEVEL_OUTOF10: 6

## 2023-11-08 NOTE — PROGRESS NOTES
Physical Therapy    Physical Therapy Treatment    Patient Name: Orlin Laguerre  MRN: [id number]  Today's Date: 11/8/2023  Time Calculation  Start Time: 1433  Stop Time: 1520  Time Calculation (min): 47 min  Visit 4    Assessment:  PT Assessment  PT Assessment Results: Decreased strength, Decreased range of motion, Pain  Evaluation/Treatment Tolerance: Patient tolerated treatment well  Assessment Comment: Pt tolerates treatment with decreased pain level after traction and MH, demonstrates improving core control during ther ex.    Plan:  OP PT Plan  Treatment/Interventions:  (HEP,Body Mechanics,Posture,(5) Strengthening,Stretching,Neuromuscular,Balance/Proprio..,Gait Training,Mechanical Traction,Thermal,ROM/Stretching,Joint Mobilization,Myofascial Release,Massage,Transfer training,Stair ambulation,Gait training)  PT Plan: Skilled PT  PT Frequency: 1 time per week  Duration: 5 weeks  Number of Treatments Authorized: 6  Plan of Care Agreement: Patient    Current Problem  1. Spondylosis without myelopathy or radiculopathy, lumbosacral region  Follow Up In Physical Therapy          Subjective   Pt reports pain level mildly reduced vs last visit    General   Pt reports weight @ 220#    Precautions  Precautions  Precautions Comment: Min to Mod fall risk  Vital Signs     Pain  Pain Score: 6  Pain Location: Back  Pain Orientation: Lower  Pain Frequency: Other (Comment)  Pain Onset: Ongoing (Pt reports pain is the worst early in the morning, improves throughout the day.)  Patient's Stated Pain Goal: 3  Pain Interventions: Traction, Warm moist pack  Response to Interventions: Pain reduced to 4/10    Objective   Pt requires fewer cues for core contractions this visit.    Treatments:  Therapeutic Exercise  Therapeutic Exercise Performed: Yes  Therapeutic Exercise Activity 1: Seated hamstring stretches  Therapeutic Exercise Activity 2: Seated Isometric abdominal brace 2x10  Therapeutic Exercise Activity 3: SeatedIsometric  "abdominal brace with pelvic floor 2x10  Therapeutic Exercise Activity 4: Seated Isometric abdominal brace with hip ADD 2x10  Therapeutic Exercise Activity 5: Seated isometric abdominal brace with hip ABD BTB 2x10  Therapeutic Exercise Activity 6: Seated with lumbar support 4'    Modalities  Modalities Used: Yes  Modality 1: Untimed Mechanical Traction, Untimed Hotpack (75/48#, 60seconds on, 20\" release)  Activity:  Endurance: Tolerates 30+ min exercise without fatigue    OP EDUCATION:  Education  Individual(s) Educated: Patient  Education Provided: Home Exercise Program  Patient Response to Education: Patient/Caregiver Verbalized Understanding of Information    Goals:See eval     "

## 2023-11-09 ENCOUNTER — APPOINTMENT (OUTPATIENT)
Dept: PAIN MEDICINE | Facility: CLINIC | Age: 58
End: 2023-11-09
Payer: MEDICARE

## 2023-11-15 ENCOUNTER — TREATMENT (OUTPATIENT)
Dept: PHYSICAL THERAPY | Facility: CLINIC | Age: 58
End: 2023-11-15
Payer: MEDICARE

## 2023-11-15 DIAGNOSIS — M47.817 SPONDYLOSIS WITHOUT MYELOPATHY OR RADICULOPATHY, LUMBOSACRAL REGION: ICD-10-CM

## 2023-11-15 PROCEDURE — 97110 THERAPEUTIC EXERCISES: CPT | Mod: GP | Performed by: PHYSICAL THERAPIST

## 2023-11-15 PROCEDURE — 97012 MECHANICAL TRACTION THERAPY: CPT | Mod: GP | Performed by: PHYSICAL THERAPIST

## 2023-11-15 NOTE — PROGRESS NOTES
"Physical Therapy Treatment    Patient Name: Orlin Laguerre  MRN: [id number]  Today's Date: 11/15/2023  Time Calculation  Start Time: 1435  Stop Time: 1515  Time Calculation (min): 40 min  PT Modalities Time Entry  Mechanical Traction Time Entry: 15  PT Therapeutic Procedures Time Entry  Therapeutic Exercise Time Entry: 25  Visit # 5    Current Problem   1. Spondylosis without myelopathy or radiculopathy, lumbosacral region  Follow Up In Physical Therapy          Subjective   General   Pt felt great for 3 days after last session after getting traction. Will be going Friday for lumbar spine injections. Finds he is still having a lot of tightness.     Precautions: None  Pain 7/10  Post Treatment Pain Level \"better\"    Objective   Forward flexed posture with mod-mild lateral flexion and curvature of spine.     Treatments:  Therapeutic Exercise  Gastroc stretch at wedge, 30 seconds x 3   Seated hamstring stretch, 30 seconds x 3 ea R/L    Seated ABDOM brace, 2x10  Seated ABDOM brace with ADD, 2x10   Standing extension, 2x10       Modalities  Modalities Used: Yes  Modality 1: Untimed Mechanical Traction (75/45#, 60 seconds on, 20\" release) x15 minutes    Assessment   Pt with positive response to traction with decreasing pain levels, will continue. Posture deficits continue to put increased strain on lumbar spine, pt well aware.       Plan: Continue with traction    OP EDUCATION: Posture    Goals:   Active       Mobility       Goal 1       Start:  11/15/23    Expected End:  02/13/24       Pt will improve lumbar spine AROM to WNL To improve I/ADLs.         Goal 2       Start:  11/15/23    Expected End:  02/13/24       Pt will improve LE strength to 5/5 to improve I/ADLs.            Pain       Goal 1       Start:  11/15/23    Expected End:  02/13/24       Pt will perform housework with <3/10 pain.         Goal 2       Start:  11/15/23    Expected End:  02/13/24       Pt will stand/walk >20 min with <3/10 pain.              "

## 2023-11-16 ENCOUNTER — APPOINTMENT (OUTPATIENT)
Dept: PAIN MEDICINE | Facility: CLINIC | Age: 58
End: 2023-11-16
Payer: MEDICARE

## 2023-11-17 ENCOUNTER — HOSPITAL ENCOUNTER (OUTPATIENT)
Dept: OPERATING ROOM | Facility: HOSPITAL | Age: 58
Setting detail: OUTPATIENT SURGERY
Discharge: HOME | End: 2023-11-17
Payer: MEDICARE

## 2023-11-17 ENCOUNTER — HOSPITAL ENCOUNTER (OUTPATIENT)
Dept: RADIOLOGY | Facility: HOSPITAL | Age: 58
Discharge: HOME | End: 2023-11-17
Payer: MEDICARE

## 2023-11-17 VITALS
HEART RATE: 67 BPM | TEMPERATURE: 97 F | WEIGHT: 220 LBS | RESPIRATION RATE: 17 BRPM | DIASTOLIC BLOOD PRESSURE: 49 MMHG | SYSTOLIC BLOOD PRESSURE: 129 MMHG | HEIGHT: 71 IN | BODY MASS INDEX: 30.8 KG/M2 | OXYGEN SATURATION: 100 %

## 2023-11-17 DIAGNOSIS — M48.062 SPINAL STENOSIS OF LUMBAR REGION WITH NEUROGENIC CLAUDICATION: ICD-10-CM

## 2023-11-17 DIAGNOSIS — M54.16 LUMBAR RADICULOPATHY: ICD-10-CM

## 2023-11-17 PROCEDURE — 77003 FLUOROGUIDE FOR SPINE INJECT: CPT

## 2023-11-17 PROCEDURE — 62323 NJX INTERLAMINAR LMBR/SAC: CPT | Performed by: ANESTHESIOLOGY

## 2023-11-17 ASSESSMENT — ENCOUNTER SYMPTOMS
NEUROLOGICAL NEGATIVE: 1
HEMATOLOGIC/LYMPHATIC NEGATIVE: 1
GASTROINTESTINAL NEGATIVE: 1
CARDIOVASCULAR NEGATIVE: 1
CONSTITUTIONAL NEGATIVE: 1
PSYCHIATRIC NEGATIVE: 1
RESPIRATORY NEGATIVE: 1
ALLERGIC/IMMUNOLOGIC NEGATIVE: 1
EYES NEGATIVE: 1
ENDOCRINE NEGATIVE: 1
BACK PAIN: 1

## 2023-11-17 ASSESSMENT — PAIN - FUNCTIONAL ASSESSMENT
PAIN_FUNCTIONAL_ASSESSMENT: 0-10
PAIN_FUNCTIONAL_ASSESSMENT: 0-10

## 2023-11-17 ASSESSMENT — PAIN SCALES - GENERAL
PAINLEVEL_OUTOF10: 5 - MODERATE PAIN
PAINLEVEL_OUTOF10: 0 - NO PAIN

## 2023-11-17 ASSESSMENT — COLUMBIA-SUICIDE SEVERITY RATING SCALE - C-SSRS
1. IN THE PAST MONTH, HAVE YOU WISHED YOU WERE DEAD OR WISHED YOU COULD GO TO SLEEP AND NOT WAKE UP?: NO
2. HAVE YOU ACTUALLY HAD ANY THOUGHTS OF KILLING YOURSELF?: NO
6. HAVE YOU EVER DONE ANYTHING, STARTED TO DO ANYTHING, OR PREPARED TO DO ANYTHING TO END YOUR LIFE?: NO

## 2023-11-17 ASSESSMENT — PAIN DESCRIPTION - DESCRIPTORS: DESCRIPTORS: ACHING
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
## 2023-11-17 NOTE — OP NOTE
Preprocedure diagnosis: lumbar spinal stenosis with neurogenic claudication  Postprocedure diagnosis lumbar spinal stenosis with neurogenic claudication    Procedure performed: L2-3 epidural steroid injection under fluoroscopic guidance    Physician: Memo Larios MD    Anesthesia: Local    Complications: none    Blood loss:  none    Clinical note: This is a very pleasant 58-year-old male who suffers with low back pain here meeting all medical criteria for above-mentioned procedure.    Procedure:      The patient was identified in the preoperative area.  The procedure was discussed in detail including its risks, benefits and alternatives.  Signed consent was obtained and the patient agreed to proceed.    The patient was brought to the [facility] procedure room and was positioned in the prone position onto the procedure room table.  A pillow was placed below the patient's abdomen..  A safety strap was placed across the legs.  The lumbar region was then exposed, prepped and draped in the usual sterile fashion using 2% Chloroprep scrub.  After that, under fluoroscopic guidance in the AP view the L2/3 intralaminar space was identified.  The intended entry point was marked onto the skin and then 10 ml of 2% preservative-free lidocaine was injected using a 25 gauge needle to anesthetize the skin and subcutaneous tissues along the intended needle trajectory.  Next, an 18 Tuohy needle was advanced under intermittent fluoroscopic guidance until bony contact was made with the lamina of L3.  The Tuohy needle was then walked off the lamina in a cephalad manner into the 2/3 intralaminar space.  Using a loss of resistance technique, the epidural space was entered with ease.  Images were taken in the AP and contralateral oblique views.  The stylette was removed from the needle and the needle was aspirated, which was negative for heme and CSF.  After that, 1 ml of Omnipaque contrast dye was injected into the needle  under live fluoroscopy which showed appropriate epidural spread without intravascular or intrathecal uptake.  Then after another negative aspirate, 40 mg of triamcinolone mixed with 5 mL of 0.5% preservative-free lidocaine was injected via the Tuohy needle.  The needle was then removed and a bandage was applied.  The patient tolerated the procedure well and was transferred back to the discharge area.  The patient was monitored for 15 minutes and vital signs were stable.  The patient was discharged home in stable condition with a .

## 2023-11-17 NOTE — DISCHARGE INSTRUCTIONS
DISCHARGEINSTRUCTIONS FOR INJECTIONS     You underwent L2/3 Epidural today    Aftermost injections, it is recommended that you relax and limit your activity for the remainder of the day unless you have been told otherwise by your pain physician.  You should not drive a car, operate machinery, or make important legal decisions unless otherwise directed by your pain physician.  You may resume your normal activity, including exercise, tomorrow.      Keep a written pain diary of how much pain relief you experienced following the injection procedure and the length of time of pain relief you experienced pain relief. Following diagnostic injections like medial branch nerve blocks, sacroiliac joint blocks, stellate ganglion injections and other blocks, it is very important you record the specific amount of pain relief you experienced immediately after the injectionand how long it lasted. Your doctor will ask you for this information at your follow up visit.     For all injections, please keep the injection site dry and inspect the site for a couple of days. You may remove the Band-Aid the day of the injection at any time.     Some discomfort, bruising or slight swelling may occur at the injection site. This is not abnormal if it occurs.  If needed you may:    -Take over the counter medication such as Tylenol or Motrin.   -Apply an ice pack for 30 minutes, 2 to 3 times a day for the first 24 hours.     You may shower today; no soaking baths, hot tubs, whirlpools or swimming pools for two days.      If you are given steroids in your injection, it may take 3-5 days for the steroid medication to take effect. You may notice a worsening of your symptoms for 1-2 days after the injection. This is not abnormal.  You may use acetaminophen, ibuprofen, or prescription medication that your doctor may have prescribed for you if you need to do so.     A few common side effects of steroids include facial flushing, sweating, restlessness,  irritability,difficulty sleeping, increase in blood sugar, and increased blood pressure. If you have diabetes, please monitor your blood sugar at least once a day for at least 5 days. If you have poorly controlled high blood pressure, monitoryour blood pressure for at least 2 days and contact your primary care physician if these numbers are unusually high for you.      If you take aspirin or non-steroidal anti-inflammatory drugs (examples are Motrin, Advil, ibuprofen, Naprosyn, Voltaren, Relafen, etc.) you may restart these this evening, but stop taking it 3 days before your next appointment, unless instructed otherwiseby your physician.      You do not need to discontinue non-aspirin-containing pain medications prior to an injection (examples: Celebrex, tramadol, hydrocodone and acetaminophen).      If you take a blood thinning medication (Coumadin, Lovenox, Fragmin,Ticlid, Plavix, Pradaxa, etc.), please discuss this with your primary care physician/cardiologist and your pain physician. These medications MUST be discontinued before you can have an injection safely, without the risk of uncontrolled bleeding. If these medications are not discontinued for an appropriate period of time, you will not be able to receivean injection.      If you are taking Coumadin, please have your INR checked the morning of your procedure and bringthe result to your appointment unless otherwise instructed. If your INR is over 1.2, your injection may need to be rescheduled to avoid uncontrolled bleeding from the needle placement.     Call [facility] at [phone] between 8am-4pm Monday - Friday if you are experiencing the following:    If you received an epidural or spinal injection:    -Headache that doesnot go away with medicine, is worse when sitting or standing up, and is greatly relieved upon lying down.   -Severe pain worse than or different than your baseline pain.   -Chills or fever (101º F or greater).    -Drainage or signs of infection at the injection site     Go directly to the Emergency Department if you are experiencing the following and received an epidural or spinal injection:   -Abrupt weakness or progressive weakness in your legs that starts after you leave the clinic.   -Abrupt severe or worsening numbness in your legs.   -Inability to urinate after the injection or loss of bowel or bladder control without the urge to defecate or urinate.     If you have a clinical question that cannot wait until your next appointment, please call [phone] between 8am-4pm Monday - Friday or send a [employer] message. We do our best to return all non-emergency messages within 24 hours, Monday - Friday. A nurse or physician will return your message.      If you need to cancel an appointment, please call the scheduling staff at [phone] during normal business hours or leave a message at least 24 hours in advance.     If you are going to be sedated for your next procedure, you MUST have responsible adult who can legally drive accompany you home. You cannot eat or drink for eight hours prior to the planned procedure if you are going to receive sedation. You may take your non-blood thinning medications with a small sip of water.

## 2023-11-17 NOTE — POST-PROCEDURE NOTE
Patient back from procedure. Bandaid dry and intact. Patient denies any pain. Discharge instructions explained to patient and copy provided. Patient able to amublate well, denies any weakness to BLE. Patient to follow up as scheduled.

## 2023-11-22 DIAGNOSIS — K21.9 GASTROESOPHAGEAL REFLUX DISEASE, UNSPECIFIED WHETHER ESOPHAGITIS PRESENT: Primary | ICD-10-CM

## 2023-11-22 RX ORDER — OMEPRAZOLE 40 MG/1
40 CAPSULE, DELAYED RELEASE ORAL DAILY
Qty: 90 CAPSULE | Refills: 1 | Status: SHIPPED | OUTPATIENT
Start: 2023-11-22 | End: 2024-05-31

## 2023-11-27 ENCOUNTER — TREATMENT (OUTPATIENT)
Dept: PHYSICAL THERAPY | Facility: CLINIC | Age: 58
End: 2023-11-27
Payer: MEDICARE

## 2023-11-27 DIAGNOSIS — M47.817 SPONDYLOSIS WITHOUT MYELOPATHY OR RADICULOPATHY, LUMBOSACRAL REGION: ICD-10-CM

## 2023-11-27 PROCEDURE — 97012 MECHANICAL TRACTION THERAPY: CPT | Mod: GP,CQ

## 2023-11-27 PROCEDURE — 97110 THERAPEUTIC EXERCISES: CPT | Mod: GP,CQ

## 2023-11-27 ASSESSMENT — PAIN SCALES - GENERAL: PAINLEVEL_OUTOF10: 5 - MODERATE PAIN

## 2023-11-27 NOTE — PROGRESS NOTES
Physical Therapy    Physical Therapy Treatment    Patient Name: Orlin Laguerre  MRN: [id number]  Today's Date: 11/27/2023  Time Calculation  Start Time: 1515  Stop Time: 1602  Time Calculation (min): 47 min  Visit 6    Assessment:  PT Assessment  PT Assessment Results: Decreased strength, Decreased range of motion, Pain  Evaluation/Treatment Tolerance: Patient tolerated treatment well  Assessment Comment: Pt performs ther ex with progressions without increased pain level, pain level reduced after MH and traction.  Plan:  OP PT Plan  Treatment/Interventions:  (HEP,Body Mechanics,Posture,(5) Strengthening,Stretching,Neuromuscular,Balance/[employer]ropri[employer]..,Gait Training,Mechanical Traction,Thermal,ROM/Stretching,Joint Mobilization,Myofascial Release,Massage,Transfer training,Stair ambulation,Gait training)  PT Plan: Skilled PT  PT Frequency: 1 time per week  Duration: 5 weeks  Number of Treatments Authorized: 6  Plan of Care Agreement: Patient    Current Problem  1. Spondylosis without myelopathy or radiculopathy, lumbosacral region  Follow Up In Physical Therapy          General  PT  Visit  PT Received On: 11/27/23  Response to Previous Treatment: Patient with no complaints from previous session.  General  Reason for Referral: 1. Spondylosis without myelopathy or radiculopathy, lumbosacral region  Follow Up In Physical Therapy    Subjective   Pt reports reduced pain after lumbar injections on 11/17.     Precautions  Precautions  Precautions Comment: Min fall risk    Pain  Pain Assessment  Pain Score: 5 - Moderate pain  Pain Location: Back  Pain Orientation: Lower  Pain Frequency: Constant/continuous  Pain Onset: Ongoing  Clinical Progression: Gradually improving  Pain Interventions: Traction, Warm pack  Response to Interventions: Pain level reduced after MH and traction    Objective   Minimal cueing required  for core contractions during there ex activities.    Activity Tolerance:  Activity Tolerance  Endurance: Tolerates  "30+ min exercise without fatigue    Treatments:  Therapeutic Exercise  Therapeutic Exercise Performed: Yes  Therapeutic Exercise Activity 1: Seated hamstring stretches  Therapeutic Exercise Activity 2: Seated Isometric abdominal brace 2x10  Therapeutic Exercise Activity 3: SeatedIsometric abdominal brace with pelvic floor 2x10  Therapeutic Exercise Activity 4: Seated Isometric abdominal brace with hip ADD 2x10  Therapeutic Exercise Activity 5: Seated isometric abdominal brace with hip ABD BTB 2x10  Therapeutic Exercise Activity 6: Standing SB pushdown 5\" hold 2x10    Modalities  Modalities Used: Yes  Modality 1: Untimed Mechanical Traction, Untimed Hotpack 15 mins, 75/45#, 60/20 seconds    OP EDUCATION:  Outpatient Education  Individual(s) Educated: Patient  Education Provided: Home Exercise Program  Patient/Caregiver Demonstrated Understanding: yes  Patient Response to Education: Patient/Caregiver Verbalized Understanding of Information    Goals:See eval    "

## 2023-11-28 ENCOUNTER — OFFICE VISIT (OUTPATIENT)
Dept: PAIN MEDICINE | Facility: CLINIC | Age: 58
End: 2023-11-28
Payer: MEDICARE

## 2023-11-28 VITALS
HEIGHT: 71 IN | WEIGHT: 220 LBS | BODY MASS INDEX: 30.8 KG/M2 | RESPIRATION RATE: 22 BRPM | HEART RATE: 76 BPM | SYSTOLIC BLOOD PRESSURE: 120 MMHG | DIASTOLIC BLOOD PRESSURE: 66 MMHG

## 2023-11-28 DIAGNOSIS — M79.18 MYOFASCIAL PAIN: ICD-10-CM

## 2023-11-28 DIAGNOSIS — Z98.890 HX OF REPAIR OF RIGHT ROTATOR CUFF: ICD-10-CM

## 2023-11-28 DIAGNOSIS — M54.12 CERVICAL RADICULOPATHY: ICD-10-CM

## 2023-11-28 DIAGNOSIS — M54.16 LUMBAR RADICULOPATHY: ICD-10-CM

## 2023-11-28 DIAGNOSIS — R42 DIZZINESS: Primary | ICD-10-CM

## 2023-11-28 DIAGNOSIS — M51.36 DISC DEGENERATION, LUMBAR: ICD-10-CM

## 2023-11-28 DIAGNOSIS — Z96.651 PRESENCE OF RIGHT ARTIFICIAL KNEE JOINT: ICD-10-CM

## 2023-11-28 PROCEDURE — 3074F SYST BP LT 130 MM HG: CPT | Performed by: PHYSICIAN ASSISTANT

## 2023-11-28 PROCEDURE — 3078F DIAST BP <80 MM HG: CPT | Performed by: PHYSICIAN ASSISTANT

## 2023-11-28 PROCEDURE — 3046F HEMOGLOBIN A1C LEVEL >9.0%: CPT | Performed by: PHYSICIAN ASSISTANT

## 2023-11-28 PROCEDURE — 99214 OFFICE O/P EST MOD 30 MIN: CPT | Performed by: PHYSICIAN ASSISTANT

## 2023-11-28 ASSESSMENT — ENCOUNTER SYMPTOMS
WHEEZING: 0
DIARRHEA: 0
BACK PAIN: 1
NECK PAIN: 1
CHILLS: 0
VOMITING: 0
COUGH: 0
PALPITATIONS: 0
VOICE CHANGE: 0
SHORTNESS OF BREATH: 0
FEVER: 0
SLEEP DISTURBANCE: 0
NAUSEA: 0
FATIGUE: 0
ACTIVITY CHANGE: 0
ARTHRALGIAS: 1
UNEXPECTED WEIGHT CHANGE: 0

## 2023-11-28 ASSESSMENT — LIFESTYLE VARIABLES
SKIP TO QUESTIONS 9-10: 1
HOW OFTEN DO YOU HAVE SIX OR MORE DRINKS ON ONE OCCASION: NEVER
HOW MANY STANDARD DRINKS CONTAINING ALCOHOL DO YOU HAVE ON A TYPICAL DAY: 1 OR 2
AUDIT-C TOTAL SCORE: 1
HOW OFTEN DO YOU HAVE A DRINK CONTAINING ALCOHOL: MONTHLY OR LESS

## 2023-11-28 ASSESSMENT — PAIN SCALES - GENERAL
PAINLEVEL_OUTOF10: 5 - MODERATE PAIN
PAINLEVEL: 5

## 2023-11-28 ASSESSMENT — PATIENT HEALTH QUESTIONNAIRE - PHQ9
2. FEELING DOWN, DEPRESSED OR HOPELESS: NOT AT ALL
1. LITTLE INTEREST OR PLEASURE IN DOING THINGS: NOT AT ALL
SUM OF ALL RESPONSES TO PHQ9 QUESTIONS 1 & 2: 0

## 2023-11-28 NOTE — ASSESSMENT & PLAN NOTE
I had nice discussion with the patient today our plan will be as follows.      Radiology: [Cervical x-rays due to spondylosis and DDD within the C3-C7 regions varying levels of degree.  No acute fractures noted at this time patient is remote 2012 imaging does show that disc bulging at this is so remote I think potentially new images may needed if conservative options such as trigger points and therapy do not provide relief of symptoms. ]      Physically:  [ continue modification of activities, healthy lifestyle choice, no initiate physical therapy for the cervical region because I do feel that there is a high potential for need for epidural injections and/or trigger point injections.  Continue to follow with orthopedist use caution with ambulation to help reduce risks of falling]      Psychologically:  [ No acute psychological concerns ]      Medication: [Continue maintenance medications with diabetic and other chronic disease states.]      Duration:  [2 to 3 months]      Intervention:  [Patient has been trying conservative options of neck exercises heat ice stretching with little abatement of the spasmodic pain within the cervical and trapezial regions.  Physical therapy has been ordered I think the patient may benefit from 20553 trigger point injections to the bilateral trapezius and bilateral scapular borders noted on exam.  Kenalog and Xylocaine the most likely therapeutic agents.    Successful lumbar epidural injection with approximately 70% reduction of symptoms with axial and radicular component we did advise continued modification of activities to reduce chances of exacerbation and greater duration of the effect.]

## 2023-11-28 NOTE — PROGRESS NOTES
Subjective   Patient ID: Orlin Laguerre is a 58 y.o. male who presents for Back Pain.  Patient is a 58-year-old male with lumbar radiculopathy history of a right knee replacement cervical radiculopathy myofascial pain history of a right rotator cuff and dizziness that presents today for follow-up.  Patient denies he is been having cervical and trapezial spasms associated with the neck aching pain radiation into the shoulders. He states with looking up increases pain and can cause dizziness  He does have a follow-up with his orthopedist Dr. Macedo for his right shoulder continued pain.  Patient did notes that his epidural injection that he received provided him about 70% reduction of his symptoms.  He continues to have axial and leg radicular reduction of pain.  He is able to walk for longer distances be in positions for longer period of time.  He does report that he did have a fall on his right arthroplasty did have some x-rays and it does seem to be getting better.  He did notes that he is in physical therapy for his low back which is also providing additional relief.  He did obtain his cervical x-rays and is here to review them.     Back Pain  Pertinent negatives include no chest pain or fever.       Review of Systems   Constitutional:  Negative for activity change, chills, fatigue, fever and unexpected weight change.   HENT:  Negative for ear pain and voice change.    Eyes:  Negative for visual disturbance.   Respiratory:  Negative for cough, shortness of breath and wheezing.    Cardiovascular:  Negative for chest pain and palpitations.   Gastrointestinal:  Negative for diarrhea, nausea and vomiting.   Musculoskeletal:  Positive for arthralgias, back pain, gait problem and neck pain.   Psychiatric/Behavioral:  Negative for behavioral problems, self-injury, sleep disturbance and suicidal ideas.        Objective   Physical Exam  Vitals reviewed.   Constitutional:       Appearance: Normal appearance.   HENT:       Head: Normocephalic and atraumatic.      Mouth/Throat:      Mouth: Mucous membranes are moist.   Neck:      Vascular: No JVD.   Pulmonary:      Effort: Pulmonary effort is normal. No tachypnea or bradypnea.   Abdominal:      Palpations: Abdomen is soft.   Musculoskeletal:      Left shoulder: Tenderness present. Decreased range of motion.      Cervical back: Spasms present. Pain with movement present. Decreased range of motion.      Lumbar back: No tenderness. Decreased range of motion. Negative right straight leg raise test and negative left straight leg raise test.        Back:    Skin:     General: Skin is warm and dry.   Neurological:      Mental Status: He is alert and oriented to person, place, and time.   Psychiatric:         Mood and Affect: Mood normal.         Behavior: Behavior normal. Behavior is cooperative.         Assessment/Plan   Problem List Items Addressed This Visit             ICD-10-CM    Lumbar radiculopathy M54.16     I had nice discussion with the patient today our plan will be as follows.      Radiology: [Cervical x-rays due to spondylosis and DDD within the C3-C7 regions varying levels of degree.  No acute fractures noted at this time patient is remote 2012 imaging does show that disc bulging at this is so remote I think potentially new images may needed if conservative options such as trigger points and therapy do not provide relief of symptoms. ]      Physically:  [ continue modification of activities, healthy lifestyle choice, no initiate physical therapy for the cervical region because I do feel that there is a high potential for need for epidural injections and/or trigger point injections.  Continue to follow with orthopedist use caution with ambulation to help reduce risks of falling]      Psychologically:  [ No acute psychological concerns ]      Medication: [Continue maintenance medications with diabetic and other chronic disease states.]      Duration:  [2 to 3  months]      Intervention:  [Patient has been trying conservative options of neck exercises heat ice stretching with little abatement of the spasmodic pain within the cervical and trapezial regions.  Physical therapy has been ordered I think the patient may benefit from 20553 trigger point injections to the bilateral trapezius and bilateral scapular borders noted on exam.  Kenalog and Xylocaine the most likely therapeutic agents.    Successful lumbar epidural injection with approximately 70% reduction of symptoms with axial and radicular component we did advise continued modification of activities to reduce chances of exacerbation and greater duration of the effect.]           Dizziness - Primary R42    Disc degeneration, lumbar M51.36    Artificial knee joint present Z96.659    Cervical radiculopathy M54.12    Relevant Orders    Referral to Physical Therapy    Myofascial pain M79.18     Other Visit Diagnoses         Codes    Hx of repair of right rotator cuff     Z98.890

## 2023-11-30 ENCOUNTER — APPOINTMENT (OUTPATIENT)
Dept: PAIN MEDICINE | Facility: CLINIC | Age: 58
End: 2023-11-30
Payer: MEDICARE

## 2023-11-30 DIAGNOSIS — E11.9 ALTERED METABOLISM DUE TO DIABETES (MULTI): Primary | ICD-10-CM

## 2023-12-01 RX ORDER — PEN NEEDLE, DIABETIC 29 G X1/2"
NEEDLE, DISPOSABLE MISCELLANEOUS
Qty: 100 EACH | Refills: 3 | Status: SHIPPED | OUTPATIENT
Start: 2023-12-01

## 2023-12-04 ENCOUNTER — TREATMENT (OUTPATIENT)
Dept: PHYSICAL THERAPY | Facility: CLINIC | Age: 58
End: 2023-12-04
Payer: MEDICARE

## 2023-12-04 DIAGNOSIS — M54.16 LUMBAR RADICULOPATHY: Primary | ICD-10-CM

## 2023-12-04 PROCEDURE — 97012 MECHANICAL TRACTION THERAPY: CPT | Mod: GP | Performed by: PHYSICAL THERAPIST

## 2023-12-04 PROCEDURE — 97110 THERAPEUTIC EXERCISES: CPT | Mod: GP | Performed by: PHYSICAL THERAPIST

## 2023-12-04 NOTE — PROGRESS NOTES
Physical Therapy Treatment    Patient Name: Orlin Laguerre  MRN: [id number]  Today's Date: 12/4/2023  Time Calculation  Start Time: 1440  Stop Time: 1520  Time Calculation (min): 40 min  PT Modalities Time Entry  Mechanical Traction Time Entry: 15  PT Therapeutic Procedures Time Entry  Therapeutic Exercise Time Entry: 25  Visit # 7    Current Problem   1. Lumbar radiculopathy            Subjective   General   Pt feeling better. Noticing some increased symptoms in low back when doing vacuuming and dishes.       Precautions: None  Pain 5/10  Post Treatment Pain Level 4/10    Objective   ABDOMINAL strength: FAIR    Treatments:  Therapeutic Exercise Activity 1: Seated hamstring stretches, 30 seconds x 3  Therapeutic Exercise Activity 2: Seated Isometric abdominal brace 2x10  Therapeutic Exercise Activity 3: SeatedIsometric abdominal brace with pelvic floor 2x10  Therapeutic Exercise Activity 4: Seated Isometric abdominal brace with hip ADD 2x10  Therapeutic Exercise Activity 5: Seated isometric abdominal brace with hip ABD BTB 2x10     Modalities  Modalities Used: Yes  Modality 1: Untimed Mechanical Traction, 75/45#, 60/20 seconds        Assessment   Reduced symptoms following traction. Continues to have abdominal strength weakness, but with improve awareness into engagement.      Plan: Traction and abdominal strength     Goals:   Active       Mobility       Goal 1       Start:  11/15/23    Expected End:  02/13/24       Pt will improve lumbar spine AROM to WNL To improve I/ADLs.         Goal 2       Start:  11/15/23    Expected End:  02/13/24       Pt will improve LE strength to 5/5 to improve I/ADLs.            Pain       Goal 1       Start:  11/15/23    Expected End:  02/13/24       Pt will perform housework with <3/10 pain.         Goal 2       Start:  11/15/23    Expected End:  02/13/24       Pt will stand/walk >20 min with <3/10 pain.

## 2023-12-11 ENCOUNTER — TREATMENT (OUTPATIENT)
Dept: PHYSICAL THERAPY | Facility: CLINIC | Age: 58
End: 2023-12-11
Payer: MEDICARE

## 2023-12-11 DIAGNOSIS — M54.16 LUMBAR RADICULOPATHY: Primary | ICD-10-CM

## 2023-12-11 PROCEDURE — 97110 THERAPEUTIC EXERCISES: CPT | Mod: GP,CQ

## 2023-12-11 PROCEDURE — 97012 MECHANICAL TRACTION THERAPY: CPT | Mod: GP,CQ

## 2023-12-11 ASSESSMENT — PAIN SCALES - GENERAL: PAINLEVEL_OUTOF10: 5 - MODERATE PAIN

## 2023-12-11 NOTE — PROGRESS NOTES
Physical Therapy    Physical Therapy Treatment    Patient Name: Orlin Laguerre  MRN: [id number]  Today's Date: 12/11/2023  Time Calculation  Start Time: 1430  Stop Time: 1515  Time Calculation (min): 45 min  Visit 8    Assessment:  PT Assessment  PT Assessment Results: Decreased strength, Decreased range of motion, Pain  Evaluation/Treatment Tolerance: Patient tolerated treatment well  Assessment Comment: Tolerates ther ex progressions without increased pain level, decreased pain level after MH and traction.  Plan:  OP PT Plan  Treatment/Interventions:  (HEP,Body Mechanics,Posture,(5) Strengthening,Stretching,Neuromuscular,Balance/Proprio..,Gait Training,Mechanical Traction,Thermal,ROM/Stretching,Joint Mobilization,Myofascial Release,Massage,Transfer training,Stair ambulation,Gait training)  PT Plan: Skilled PT  PT Frequency: 1 time per week  Duration: 5 weeks  Number of Treatments Authorized: 6  Plan of Care Agreement: Patient    Current Problem  1. Spondylosis without myelopathy or radiculopathy, lumbosacral region Follow Up In Physical Therapy     General  PT  Visit  PT Received On: 12/11/23  Response to Previous Treatment: Patient with no complaints from previous session.  General  Reason for Referral: 1. Spondylosis without myelopathy or radiculopathy, lumbosacral region  Follow Up In Physical Therapy    Subjective    Pt reports moderate LB pain on arrival.    Precautions  Precautions  Precautions Comment: Min fall risk      Pain  Pain Assessment  Pain Score: 5 - Moderate pain  Pain Location: Back  Pain Orientation: Lower  Pain Frequency: Constant/continuous  Pain Onset: Ongoing  Clinical Progression: Gradually improving  Pain Interventions: Traction, Warm pack  Response to Interventions: Pain level reduced after MH and traction    Objective   Mild antalgic gait on arrival, increased ynes after traction and MH.    Activity Tolerance:  Activity Tolerance  Endurance: Tolerates 30+ min exercise without  "fatigue    Treatments:  Therapeutic Exercise  Therapeutic Exercise Performed: Yes  Therapeutic Exercise Activity 1: Seated hamstring stretches  Therapeutic Exercise Activity 4: Seated Isometric abdominal brace with hip ADD 2x10  Therapeutic Exercise Activity 5: Seated isometric abdominal brace with hip ABD BTB 2x10  Therapeutic Exercise Activity 6: Standing SB pushdown 5\" hold 2x10  Therapeutic Exercise Activity 7: Nustep L5 6'  Therapeutic Exercise Activity 8: Pallof press PTB x10 L/R each    Modalities  Modalities Used: Yes  Modality 1: Untimed Mechanical Traction, Untimed Hotpack (75#/55#, 60 seconds/20 seconds  15 minutes)  Modality 2: Untimed Hotpack (Used during traction)    OP EDUCATION:  Outpatient Education  Individual(s) Educated: Patient  Education Provided: Body Mechanics, Home Exercise Program  Patient/Caregiver Demonstrated Understanding: yes  Patient Response to Education: Patient/Caregiver Verbalized Understanding of Information, Patient/Caregiver Performed Return Demonstration of Exercises/Activities    Goals:    Active         Mobility         Goal 1         Start:  11/15/23    Expected End:  02/13/24        Pt will improve lumbar spine AROM to WNL To improve I/ADLs.           Goal 2         Start:  11/15/23    Expected End:  02/13/24        Pt will improve LE strength to 5/5 to improve I/ADLs.              Pain         Goal 1         Start:  11/15/23    Expected End:  02/13/24        Pt will perform housework with <3/10 pain.           Goal 2         Start:  11/15/23    Expected End:  02/13/24        Pt will stand/walk >20 min with <3/10 pain.           "

## 2023-12-14 ENCOUNTER — OFFICE VISIT (OUTPATIENT)
Dept: PRIMARY CARE | Facility: CLINIC | Age: 58
End: 2023-12-14
Payer: MEDICARE

## 2023-12-14 VITALS
HEART RATE: 78 BPM | BODY MASS INDEX: 43.05 KG/M2 | SYSTOLIC BLOOD PRESSURE: 130 MMHG | OXYGEN SATURATION: 96 % | HEIGHT: 61 IN | WEIGHT: 228 LBS | DIASTOLIC BLOOD PRESSURE: 82 MMHG

## 2023-12-14 DIAGNOSIS — E11.9 ALTERED METABOLISM DUE TO DIABETES (MULTI): Primary | ICD-10-CM

## 2023-12-14 LAB — POC HEMOGLOBIN: 8.5 G/DL (ref 13.5–17.5)

## 2023-12-14 PROCEDURE — 3079F DIAST BP 80-89 MM HG: CPT | Performed by: FAMILY MEDICINE

## 2023-12-14 PROCEDURE — 3075F SYST BP GE 130 - 139MM HG: CPT | Performed by: FAMILY MEDICINE

## 2023-12-14 PROCEDURE — 99213 OFFICE O/P EST LOW 20 MIN: CPT | Performed by: FAMILY MEDICINE

## 2023-12-14 PROCEDURE — 3046F HEMOGLOBIN A1C LEVEL >9.0%: CPT | Performed by: FAMILY MEDICINE

## 2023-12-14 ASSESSMENT — ENCOUNTER SYMPTOMS: POLYDIPSIA: 0

## 2023-12-14 ASSESSMENT — PAIN SCALES - GENERAL: PAINLEVEL: 7

## 2023-12-14 NOTE — PROGRESS NOTES
"[facility]: [facility]  E/M EVALUATION    Olrin Laguerre is a 58 y.o. male who presents for a1c.    Subjective   DM - not controlled.   Lowest 70  using dexcom,  51% in range. Had injections last month for back.  Therapy is helping back pain.  He has glucose tabs.       Review of Systems   Endocrine: Negative for polydipsia and polyuria.       Objective   Vitals:    12/14/23 1315   BP: 130/82   Pulse: 78   SpO2: 96%     Physical Exam  Constitutional:       Appearance: Normal appearance.   Cardiovascular:      Rate and Rhythm: Normal rate and regular rhythm.      Heart sounds: No murmur heard.  Pulmonary:      Effort: Pulmonary effort is normal.      Breath sounds: Normal breath sounds.   Neurological:      Mental Status: He is alert.       Cholesterol   Date Value Ref Range Status   09/06/2023 139 133 - 200 MG/DL Final     Triglycerides   Date Value Ref Range Status   09/06/2023 486 (H) 40 - 150 MG/DL Final     HDL   Date Value Ref Range Status   09/06/2023 40 (L) >40 MG/DL Final     Comment:     National Cholesterol Education Program(NCFP)guidelines:   <40 mg/dl:Low HDL-cholesterol(major risk factor for CHD)   >60 mg/dl:High HDL-cholesterol(\"negative\"risk factor for CHD)   HDL-cholesterol is affected by a number of factors,e.g.,smoking,  exercise,hormones,sex and age.       LDL Calculated   Date Value Ref Range Status   09/06/2023  65 - 130 MG/DL Final    Unable to report calculated LDL due to increased triglycerides. Direct     Comment:      LDL to be run.     Cholesterol/HDL Ratio   Date Value Ref Range Status   09/06/2023 3.5 RATIO Final     Comment:     According to the American Heart Association, the goal is to maintain   the total cholesterol/HDL ratio at 5-to-1 or lower with an optimum   ratio of 3.5-to-1.  Performed at [facility] [id number] [place] [id number]       Glucose   Date Value Ref Range Status   09/06/2023 313 (H) 65 - 99 MG/DL Final     Sodium   Date Value Ref Range " Status   09/06/2023 131 (L) 133 - 145 MMOL/L Final     Potassium   Date Value Ref Range Status   09/06/2023 4.0 3.4 - 5.1 MMOL/L Final     ALT (SGPT)   Date Value Ref Range Status   09/06/2023 16 5 - 40 U/L Final     ESTIMATED GFR   Date Value Ref Range Status   09/06/2023 100 mL/min/1.73 m2 Final     Comment:     CALCULATIONS OF ESTIMATED GFR ARE PERFORMED USING THE 2021 CKD-EPI   STUDY REFIT EQUATION WITHOUT THE RACE VARIABLE FOR THE IDMS-TRACEABLE   CREATININE METHODS.  https://jasn.asnjournals.org/content/early/2021/09/22/ASN.2914814372  Performed at [address] [address]       Hemoglobin A1C   Date Value Ref Range Status   09/06/2023 9.6 (H) 4.0 - 6.0 % Final     Comment:     Hemoglobin A1C levels are related to mean blood glucose during the   preceding 2-3 months. The relationship table below may be used as a   general guide. Each 1% increase in HGB A1C is a reflection of an   increase in mean glucose of approximately 30 mg/dl.   Reference: Diabetes Care, volume 29, supplement 1 Jan. 2006                        HGB A1C ................. Approx. Mean Glucose   _______________________________________________   6%   ...............................  120 mg/dl   7%   ...............................  150 mg/dl   8%   ...............................  180 mg/dl   9%   ...............................  210 mg/dl   10%  ...............................  240 mg/dl  Performed at [address] [address]       Thyroid Stimulating Hormone   Date Value Ref Range Status   01/18/2022 1.45 0.27 - 4.20 MIU/L Final     Comment:     Performed at [address] [address]       Assessment/Plan      Patient Active Problem List   Diagnosis    Allergic reaction    Sciatica, left side    Acute pain of right shoulder    Right elbow pain    Recurrent erosion of cornea, right eye    PTSD (post-traumatic stress disorder)    Peripheral vascular disease (CMS/HCC)     Palpitations    Senile incipient cataract    Pain in left knee    Osteoarthritis of left knee    Open wound of finger with tendon involvement    Neck strain    Muscle weakness    Mild intermittent asthma without complication    Lumbar radiculopathy    Leg cramping    Laceration of left thumb without foreign body without damage to nail    Injury of foot    Iatrogenic pneumothorax    Hypertriglyceridemia    Mixed hyperlipidemia    Hyperosmolar non-ketotic state due to type 2 diabetes mellitus (CMS/HCC)    Gastroesophageal reflux disease    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    Dizziness    Essential hypertension    Disc degeneration, lumbar    Diabetic neuropathy (CMS/HCC)    Dehydration    Degenerative joint disease (DJD) of lumbar spine    Cubital tunnel syndrome on right    Entrapment of right ulnar nerve    Lesion of ulnar nerve    Coronary artery disease    Corneal rust ring of right eye    Foreign body in cornea, left eye, subsequent encounter    Complete tear of right rotator cuff    Incomplete tear of right rotator cuff    Combined form of senile cataract of both eyes    Closed fracture of phalanx of foot    Chronic calculous cholecystitis    Candidiasis of mouth    Bilateral dry eyes    Carpal tunnel syndrome of right wrist    Benign localized prostatic hyperplasia with lower urinary tract symptoms (LUTS)    Asthma    Atelectasis    Asthenia    Artificial knee joint present    Arthritis of right elbow    Acute anxiety    Abdominal pain    Angina pectoris (CMS/HCC)    Chest pain    Altered metabolism due to diabetes (CMS/HCC)    Acute pulmonary edema (CMS/HCC)    Accidental fall    Altered cardiopulmonary tissue perfusion    Cervical radiculopathy    Myofascial pain       Diagnoses and all orders for this visit:  Altered metabolism due to diabetes (CMS/HCC)  -     POCT Hemoglobin manually resulted  -     Comprehensive Metabolic Panel; Future  -     Hemoglobin A1C; Future      The patient was encouraged to ensure that  any/all documentation is accurate and up to date, and that our office be provided a copy in the event that anything changes.         Sukhjinder Mandujano MD

## 2023-12-18 ENCOUNTER — TREATMENT (OUTPATIENT)
Dept: PHYSICAL THERAPY | Facility: CLINIC | Age: 58
End: 2023-12-18
Payer: MEDICARE

## 2023-12-18 PROCEDURE — 97012 MECHANICAL TRACTION THERAPY: CPT | Mod: GP,CQ

## 2023-12-18 PROCEDURE — 97110 THERAPEUTIC EXERCISES: CPT | Mod: GP,CQ

## 2023-12-18 ASSESSMENT — PAIN SCALES - GENERAL: PAINLEVEL_OUTOF10: 5 - MODERATE PAIN

## 2023-12-18 NOTE — PROGRESS NOTES
Physical Therapy    Physical Therapy Treatment    Patient Name: Orlin Laguerre  MRN: [id number]  Today's Date: 12/18/2023  Time Calculation  Start Time: 1346  Stop Time: 1428  Time Calculation (min): 42 min  Visit p    Assessment:  PT Assessment  PT Assessment Results: Decreased strength, Decreased range of motion, Pain  Evaluation/Treatment Tolerance: Patient tolerated treatment well  Assessment Comment: Pt dempnstrates fair to good core control during ther ex activites.  Plan:  OP PT Plan  Treatment/Interventions:  (HEP,Body Mechanics,Posture,(5) Strengthening,Stretching,Neuromuscular,Balance/Proprio..,Gait Training,Mechanical Traction,Thermal,ROM/Stretching,Joint Mobilization,Myofascial Release,Massage,Transfer training,Stair ambulation,Gait training)  PT Plan: Skilled PT  PT Frequency: 1 time per week  Duration: 5 weeks  Number of Treatments Authorized: 6  Plan of Care Agreement: Patient    Current Problem  1. Spondylosis without myelopathy or radiculopathy, lumbosacral region     General  PT  Visit  PT Received On: 12/18/23  Response to Previous Treatment: Patient with no complaints from previous session.  General  Reason for Referral: 1. Spondylosis without myelopathy or radiculopathy, lumbosacral region  Follow Up In Physical Therapy    Subjective    Pt reports moderate pain persists in LB.  Precautions  Precautions  Precautions Comment: Min fall risk    Pain  Pain Assessment  Pain Score: 5 - Moderate pain  Pain Location: Back  Pain Orientation: Lower  Pain Frequency: Constant/continuous  Pain Onset: Ongoing  Clinical Progression: Not changed  Pain Interventions: Traction, Warm moist pack    Objective   Pt requires fewer cues for core contractions this visit.  Activity Tolerance:  Activity Tolerance  Endurance: Tolerates 30+ min exercise without fatigue  Treatments:  Therapeutic Exercise  Therapeutic Exercise Performed: Yes  Therapeutic Exercise Activity 1: Seated hamstring stretches  Therapeutic Exercise  "Activity 4: Seated Isometric abdominal brace with hip ADD 2x10  Therapeutic Exercise Activity 5: Seated isometric abdominal brace with hip ABD BTB 2x10  Therapeutic Exercise Activity 6: Standing SB pushdown 5\" hold 2x10  Therapeutic Exercise Activity 7: Nustep L5 6'  Therapeutic Exercise Activity 8: Pallof press PTB x10 L/R each    Modalities  Modalities Used: Yes  Modality 1: Untimed Mechanical Traction, Untimed Hotpack (78#/55#, 60 seconds/20 seconds  15 minutes)  Modality 2: Untimed Hotpack (Used during traction)    OP EDUCATION:  Outpatient Education  Individual(s) Educated: Patient  Education Provided: Home Exercise Program  Patient/Caregiver Demonstrated Understanding: yes  Patient Response to Education: Patient/Caregiver Performed Return Demonstration of Exercises/Activities, Patient/Caregiver Verbalized Understanding of Information    Goals:    Active         Mobility         Goal 1         Start:  11/15/23    Expected End:  02/13/24        Pt will improve lumbar spine AROM to WNL To improve I/ADLs.           Goal 2         Start:  11/15/23    Expected End:  02/13/24        Pt will improve LE strength to 5/5 to improve I/ADLs.              Pain         Goal 1         Start:  11/15/23    Expected End:  02/13/24        Pt will perform housework with <3/10 pain.           Goal 2         Start:  11/15/23    Expected End:  02/13/24        Pt will stand/walk >20 min with <3/10 pain.           "

## 2023-12-26 ENCOUNTER — TREATMENT (OUTPATIENT)
Dept: PHYSICAL THERAPY | Facility: CLINIC | Age: 58
End: 2023-12-26
Payer: MEDICARE

## 2023-12-26 DIAGNOSIS — M54.16 LUMBAR RADICULOPATHY: Primary | ICD-10-CM

## 2023-12-26 PROCEDURE — 97110 THERAPEUTIC EXERCISES: CPT | Mod: GP | Performed by: PHYSICAL THERAPIST

## 2023-12-26 PROCEDURE — 97012 MECHANICAL TRACTION THERAPY: CPT | Mod: GP | Performed by: PHYSICAL THERAPIST

## 2023-12-26 NOTE — PROGRESS NOTES
"Physical Therapy Treatment/Discharge    Patient Name: Orlin Laguerre  MRN: [id number]  Today's Date: 12/26/2023  Time Calculation  Start Time: 1529  Stop Time: 1610  Time Calculation (min): 41 min  PT Modalities Time Entry  Mechanical Traction Time Entry: 15  PT Therapeutic Procedures Time Entry  Therapeutic Exercise Time Entry: 25  Visit # 10/10    Current Problem   1. Lumbar radiculopathy            Subjective   General   Pt states low back remains sore. Finds muscle relaxer and motrin to help symptoms.      Precautions: None  Pain 8/10  Post Treatment Pain Level 4/10    Objective   Lumbar spine AROM:  Flex 80%  Ext 50%  Lat Flex L/R 100%  Rot L/R 100%    Bilat LE strength:  Hip flex 5/5  Knee flex 5/5  Knee ext 5/5    Flexibility: WNL  Gait: Ambulates with mild decrease in ynes, otherwise no deficits.   Denies current numbness/tingling    Treatments:  Therapeutic Exercise:  Therapeutic Exercise Activity 1: Seated hamstring stretches, 30 seconds x 3  Therapeutic Exercise Activity 4: Seated Isometric abdominal brace with hip ADD 2x10  Therapeutic Exercise Activity 5: Seated isometric abdominal brace with hip ABD BTB 2x10  Therapeutic Exercise Activity 6: Standing SB pushdown 5\" hold 2x10  Therapeutic Exercise Activity 7: Nustep L5 6'     Modalities  Modality 1: Untimed Mechanical Traction with Heat (78#/55#, 60 seconds/20 seconds for 15 minutes)    Assessment   Pt with slow but good progress during therapy and has met most goals but they remain overall partially met. He has improved LE strength and gait, however remains with pain and AROM deficits. I recommend pt return to physician for further assessment. Pt to be discharged at this time and continue with HEP on his own. He is in good understanding.     Plan: Discharge    OP EDUCATION: Cont with HEP on own.    Goals:   Active       Pain       Goal 1 (Progressing)       Start:  11/15/23    Expected End:  02/13/24       Pt will perform housework with <3/10 pain. "         Goal 2 (Progressing)       Start:  11/15/23    Expected End:  02/13/24       Pt will stand/walk >20 min with <3/10 pain.           Resolved       Mobility       Goal 1 (Met)       Start:  11/15/23    Expected End:  02/13/24    Resolved:  12/26/23    Pt will improve lumbar spine AROM to WNL To improve I/ADLs.         Goal 2 (Met)       Start:  11/15/23    Expected End:  02/13/24    Resolved:  12/26/23    Pt will improve LE strength to 5/5 to improve I/ADLs.

## 2024-01-12 ENCOUNTER — HOSPITAL ENCOUNTER (OUTPATIENT)
Dept: RADIOLOGY | Facility: EXTERNAL LOCATION | Age: 59
Discharge: HOME | End: 2024-01-12

## 2024-01-12 DIAGNOSIS — M54.50 LEFT LUMBAR PAIN: ICD-10-CM

## 2024-01-16 PROBLEM — M47.812 SPONDYLOSIS WITHOUT MYELOPATHY OR RADICULOPATHY, CERVICAL REGION: Status: ACTIVE | Noted: 2024-01-16

## 2024-01-16 PROBLEM — Z86.79 HISTORY OF HYPERTENSION: Status: ACTIVE | Noted: 2024-01-16

## 2024-01-16 PROBLEM — K76.0 STEATOSIS OF LIVER: Status: ACTIVE | Noted: 2024-01-16

## 2024-01-16 PROBLEM — F17.200 TOBACCO USE DISORDER: Status: ACTIVE | Noted: 2024-01-16

## 2024-01-16 PROBLEM — M50.20 HERNIATED DISC, CERVICAL: Status: ACTIVE | Noted: 2024-01-16

## 2024-01-16 PROBLEM — Z93.3 STATUS POST COLOSTOMY (MULTI): Status: ACTIVE | Noted: 2024-01-16

## 2024-01-16 PROBLEM — Z86.59 HISTORY OF DEPRESSION: Status: ACTIVE | Noted: 2024-01-16

## 2024-01-16 PROBLEM — M75.100 TEAR OF ROTATOR CUFF: Status: ACTIVE | Noted: 2023-01-27

## 2024-01-16 PROBLEM — G43.909 MIGRAINES: Status: ACTIVE | Noted: 2024-01-16

## 2024-01-16 PROBLEM — L03.90 CELLULITIS: Status: ACTIVE | Noted: 2024-01-16

## 2024-01-16 PROBLEM — I50.20 SYSTOLIC HEART FAILURE (MULTI): Status: ACTIVE | Noted: 2022-08-22

## 2024-01-16 PROBLEM — R55 VASOVAGAL SYNCOPE: Status: ACTIVE | Noted: 2024-01-16

## 2024-01-16 PROBLEM — M47.817 SPONDYLOSIS WITHOUT MYELOPATHY OR RADICULOPATHY, LUMBOSACRAL REGION: Status: ACTIVE | Noted: 2024-01-16

## 2024-01-16 PROBLEM — F32.A DEPRESSION: Status: ACTIVE | Noted: 2024-01-16

## 2024-01-16 PROBLEM — R05.9 COUGH: Status: ACTIVE | Noted: 2024-01-16

## 2024-01-16 PROBLEM — E66.9 OBESITY WITH BODY MASS INDEX 30 OR GREATER: Status: ACTIVE | Noted: 2024-01-16

## 2024-01-16 PROBLEM — S83.92XA SPRAIN OF LEFT KNEE: Status: ACTIVE | Noted: 2024-01-16

## 2024-01-16 PROBLEM — Z86.69 HISTORY OF HEARING LOSS: Status: ACTIVE | Noted: 2024-01-16

## 2024-01-16 PROBLEM — E66.9 DIABETES MELLITUS TYPE 2 IN OBESE: Status: ACTIVE | Noted: 2024-01-16

## 2024-01-16 PROBLEM — E11.69 DIABETES MELLITUS TYPE 2 IN OBESE: Status: ACTIVE | Noted: 2024-01-16

## 2024-01-16 PROBLEM — A41.9 SEPSIS (MULTI): Status: ACTIVE | Noted: 2023-08-24

## 2024-01-16 PROBLEM — R40.1 CLOUDED CONSCIOUSNESS: Status: ACTIVE | Noted: 2024-01-16

## 2024-01-16 PROBLEM — G89.29 CHRONIC PAIN: Status: ACTIVE | Noted: 2024-01-16

## 2024-01-16 PROBLEM — Z86.69 HISTORY OF MIGRAINE: Status: ACTIVE | Noted: 2024-01-16

## 2024-01-16 PROBLEM — R06.02 SHORTNESS OF BREATH: Status: ACTIVE | Noted: 2024-01-16

## 2024-01-16 PROBLEM — M51.26 LUMBAR HERNIATED DISC: Status: ACTIVE | Noted: 2024-01-16

## 2024-01-17 ENCOUNTER — OFFICE VISIT (OUTPATIENT)
Dept: PAIN MEDICINE | Facility: CLINIC | Age: 59
End: 2024-01-17
Payer: MEDICARE

## 2024-01-17 ENCOUNTER — APPOINTMENT (OUTPATIENT)
Dept: PAIN MEDICINE | Facility: CLINIC | Age: 59
End: 2024-01-17
Payer: MEDICARE

## 2024-01-17 VITALS
RESPIRATION RATE: 18 BRPM | SYSTOLIC BLOOD PRESSURE: 128 MMHG | WEIGHT: 228 LBS | HEART RATE: 73 BPM | HEIGHT: 61 IN | DIASTOLIC BLOOD PRESSURE: 69 MMHG | BODY MASS INDEX: 43.05 KG/M2

## 2024-01-17 DIAGNOSIS — M54.12 CERVICAL RADICULOPATHY: ICD-10-CM

## 2024-01-17 DIAGNOSIS — M79.18 MYOFASCIAL PAIN: ICD-10-CM

## 2024-01-17 DIAGNOSIS — M54.16 LUMBAR RADICULOPATHY: Primary | ICD-10-CM

## 2024-01-17 PROCEDURE — 20552 NJX 1/MLT TRIGGER POINT 1/2: CPT | Performed by: PHYSICIAN ASSISTANT

## 2024-01-17 PROCEDURE — 2500000004 HC RX 250 GENERAL PHARMACY W/ HCPCS (ALT 636 FOR OP/ED): Performed by: PHYSICIAN ASSISTANT

## 2024-01-17 PROCEDURE — 96372 THER/PROPH/DIAG INJ SC/IM: CPT | Mod: 59 | Performed by: PHYSICIAN ASSISTANT

## 2024-01-17 PROCEDURE — 2500000005 HC RX 250 GENERAL PHARMACY W/O HCPCS: Performed by: PHYSICIAN ASSISTANT

## 2024-01-17 PROCEDURE — 3074F SYST BP LT 130 MM HG: CPT | Performed by: PHYSICIAN ASSISTANT

## 2024-01-17 PROCEDURE — 99214 OFFICE O/P EST MOD 30 MIN: CPT | Performed by: PHYSICIAN ASSISTANT

## 2024-01-17 PROCEDURE — 3078F DIAST BP <80 MM HG: CPT | Performed by: PHYSICIAN ASSISTANT

## 2024-01-17 RX ORDER — LIDOCAINE HYDROCHLORIDE 10 MG/ML
1 INJECTION, SOLUTION EPIDURAL; INFILTRATION; INTRACAUDAL; PERINEURAL ONCE
Status: COMPLETED | OUTPATIENT
Start: 2024-01-17 | End: 2024-01-17

## 2024-01-17 RX ORDER — TRIAMCINOLONE ACETONIDE 40 MG/ML
40 INJECTION, SUSPENSION INTRA-ARTICULAR; INTRAMUSCULAR ONCE
Status: COMPLETED | OUTPATIENT
Start: 2024-01-17 | End: 2024-01-17

## 2024-01-17 RX ADMIN — LIDOCAINE HYDROCHLORIDE 10 MG: 10 INJECTION, SOLUTION EPIDURAL; INFILTRATION; INTRACAUDAL; PERINEURAL at 13:27

## 2024-01-17 RX ADMIN — TRIAMCINOLONE ACETONIDE 40 MG: 40 INJECTION, SUSPENSION INTRA-ARTICULAR; INTRAMUSCULAR at 13:28

## 2024-01-17 ASSESSMENT — ENCOUNTER SYMPTOMS
PALPITATIONS: 0
VOMITING: 0
FATIGUE: 0
VOICE CHANGE: 0
SHORTNESS OF BREATH: 0
CHILLS: 0
WHEEZING: 0
BACK PAIN: 1
MYALGIAS: 1
NAUSEA: 0
FEVER: 0
DIARRHEA: 0
UNEXPECTED WEIGHT CHANGE: 0
SLEEP DISTURBANCE: 0
COUGH: 0
ACTIVITY CHANGE: 0

## 2024-01-17 ASSESSMENT — PAIN - FUNCTIONAL ASSESSMENT: PAIN_FUNCTIONAL_ASSESSMENT: 0-10

## 2024-01-17 ASSESSMENT — PATIENT HEALTH QUESTIONNAIRE - PHQ9
2. FEELING DOWN, DEPRESSED OR HOPELESS: NOT AT ALL
1. LITTLE INTEREST OR PLEASURE IN DOING THINGS: NOT AT ALL
SUM OF ALL RESPONSES TO PHQ9 QUESTIONS 1 AND 2: 0

## 2024-01-17 ASSESSMENT — LIFESTYLE VARIABLES: TOTAL SCORE: 0

## 2024-01-17 ASSESSMENT — PAIN DESCRIPTION - DESCRIPTORS: DESCRIPTORS: RADIATING;SHARP

## 2024-01-17 ASSESSMENT — PAIN SCALES - GENERAL: PAINLEVEL_OUTOF10: 8

## 2024-01-17 NOTE — PROGRESS NOTES
Subjective   Patient ID: Orlin Laguerre is a 58 y.o. male who presents for Back Pain.  Patient is a 58-year-old male who presents today for acute low back pain spasms and radiation.  Patient does note that on 110 while lying under his sink and doing some repair work he did feel a pop in his back and developed intense left-sided low back pain patient denies that it is sharp causes difficulty with breathing when he has the intense spasms and pain.  Radiates to the buttocks.  Patient states he went to the urgent care on 112.  They provided him with oral medication but did not do any treatments for him other than that.    Back Pain  Pertinent negatives include no chest pain or fever.       Review of Systems   Constitutional:  Negative for activity change, chills, fatigue, fever and unexpected weight change.   HENT:  Negative for ear pain and voice change.    Eyes:  Negative for visual disturbance.   Respiratory:  Negative for cough, shortness of breath and wheezing.    Cardiovascular:  Negative for chest pain and palpitations.   Gastrointestinal:  Negative for diarrhea, nausea and vomiting.   Musculoskeletal:  Positive for back pain, gait problem and myalgias.   Psychiatric/Behavioral:  Negative for behavioral problems, self-injury, sleep disturbance and suicidal ideas.        Objective   Physical Exam  Musculoskeletal:      Lumbar back: Spasms and tenderness present. Decreased range of motion. Positive left straight leg raise test. Negative right straight leg raise test.        Back:       Comments: Difficulty with sit to stands.  Patient looks in acute distress it is a spasm pain.        Assessment/Plan   Problem List Items Addressed This Visit             ICD-10-CM    Myofascial pain M79.18    Relevant Medications    lidocaine PF (Xylocaine) 10 mg/mL (1 %) injection 10 mg (Start on 1/17/2024  1:30 PM)    triamcinolone acetonide (Kenalog-40) injection 40 mg (Start on 1/17/2024  1:30 PM)    Other Relevant Orders     Inject Trigger Point, 1 or 2     I had nice discussion with the patient today our plan will be as follows.      Radiology: [Finds x-rays reviewed no acute findings chronic conditions]      Physically:  [ continue modification of activities, healthy lifestyle choice, start cspine pt ]      Psychologically:  [ No acute psychological concerns ]      Medication: [Patient to discontinue anti-inflammatories during the next 5 days]      Duration:  [ post PT cspine ]      Intervention:  [Sterile technique and informed consent the to exquisite tender points within the lumbar and mid thoracic region were infiltrated the cocktail consisting 1 cc Kenalog and 1 cc of Xylocaine patient tolerated the procedure well hemostasis easily achieved post care instructions provided patient advised take it easy for the next 72 hours and the consent form was signed.  If patient's symptoms do not primo and he still can have the continued and increasing radicular component I think it would be prudent to proceed forward with an interlaminar epidural injection at the L2-3 all local sedation fluoroscopy guidance with Dr. MENDENHALL.  Patient verbalized understanding will contact us within 10 days of this procedure to determine whether or not he is having benefit or we are going to move forward with the epidural injection]         Wesley Herbert PA-C 01/17/24 1:05 PM

## 2024-01-22 ENCOUNTER — APPOINTMENT (OUTPATIENT)
Dept: PAIN MEDICINE | Facility: CLINIC | Age: 59
End: 2024-01-22
Payer: MEDICARE

## 2024-01-24 DIAGNOSIS — J45.20 MILD INTERMITTENT ASTHMA WITHOUT COMPLICATION (HHS-HCC): Primary | ICD-10-CM

## 2024-01-24 RX ORDER — ALBUTEROL SULFATE 90 UG/1
AEROSOL, METERED RESPIRATORY (INHALATION)
Qty: 18 G | Refills: 1 | Status: SHIPPED | OUTPATIENT
Start: 2024-01-24 | End: 2024-02-28

## 2024-01-31 ENCOUNTER — OFFICE VISIT (OUTPATIENT)
Dept: PRIMARY CARE | Facility: CLINIC | Age: 59
End: 2024-01-31
Payer: MEDICARE

## 2024-01-31 VITALS
SYSTOLIC BLOOD PRESSURE: 126 MMHG | OXYGEN SATURATION: 98 % | DIASTOLIC BLOOD PRESSURE: 80 MMHG | WEIGHT: 210 LBS | BODY MASS INDEX: 29.4 KG/M2 | HEIGHT: 71 IN | HEART RATE: 79 BPM

## 2024-01-31 DIAGNOSIS — J40 BRONCHITIS: Primary | ICD-10-CM

## 2024-01-31 DIAGNOSIS — Z87.891 PERSONAL HISTORY OF TOBACCO USE: ICD-10-CM

## 2024-01-31 PROCEDURE — 3074F SYST BP LT 130 MM HG: CPT | Performed by: FAMILY MEDICINE

## 2024-01-31 PROCEDURE — 99213 OFFICE O/P EST LOW 20 MIN: CPT | Performed by: FAMILY MEDICINE

## 2024-01-31 PROCEDURE — 3079F DIAST BP 80-89 MM HG: CPT | Performed by: FAMILY MEDICINE

## 2024-01-31 RX ORDER — AZITHROMYCIN 250 MG/1
TABLET, FILM COATED ORAL
Qty: 6 TABLET | Refills: 0 | Status: SHIPPED | OUTPATIENT
Start: 2024-01-31 | End: 2024-02-05

## 2024-01-31 RX ORDER — PREDNISONE 20 MG/1
40 TABLET ORAL DAILY
Qty: 10 TABLET | Refills: 0 | Status: SHIPPED | OUTPATIENT
Start: 2024-01-31 | End: 2024-02-05

## 2024-01-31 ASSESSMENT — ENCOUNTER SYMPTOMS
SHORTNESS OF BREATH: 1
CHEST TIGHTNESS: 1
FEVER: 0
SINUS PAIN: 1
FATIGUE: 1
WHEEZING: 1
CHILLS: 0
ABDOMINAL PAIN: 0
SORE THROAT: 0
DIARRHEA: 0
SINUS PRESSURE: 1
ACTIVITY CHANGE: 1

## 2024-01-31 ASSESSMENT — PAIN SCALES - GENERAL: PAINLEVEL: 6

## 2024-01-31 NOTE — PROGRESS NOTES
[facility]: [facility]  E/M EVALUATION    Orlin Laguerre is a 58 y.o. male who presents for Sick Visit.    Subjective   Ill x 9 days,  states right ear pain, chest congestion and wheezing.  + tobacco use.  Sudafed w/o much relief.  Nyquil/dayquil w/o relief.  Inhaler not working.       Review of Systems   Constitutional:  Positive for activity change and fatigue. Negative for chills and fever.   HENT:  Positive for ear pain, sinus pressure and sinus pain. Negative for sore throat.    Respiratory:  Positive for chest tightness, shortness of breath and wheezing.    Gastrointestinal:  Negative for abdominal pain and diarrhea.       Objective   Vitals:    01/31/24 1058   BP: 126/80   Pulse: 79   SpO2: 98%     Physical Exam  Constitutional:       Appearance: Normal appearance.   HENT:      Right Ear: Tympanic membrane normal.      Left Ear: Tympanic membrane normal.   Cardiovascular:      Rate and Rhythm: Normal rate and regular rhythm.      Heart sounds: Normal heart sounds.   Pulmonary:      Effort: No respiratory distress.      Breath sounds: Decreased air movement present. Examination of the right-upper field reveals wheezing and rhonchi. Examination of the left-upper field reveals wheezing and rhonchi. Examination of the right-middle field reveals wheezing and rhonchi. Examination of the left-middle field reveals wheezing and rhonchi. Examination of the right-lower field reveals wheezing and rhonchi. Examination of the left-lower field reveals wheezing and rhonchi. Wheezing and rhonchi present. No rales.      Comments: Tactile fremtius bilateral lung fields.   Neurological:      Mental Status: He is alert.           Assessment/Plan      Patient Active Problem List   Diagnosis    Sepsis (CMS/[facility])    Sciatica, left side    Acute pain of right shoulder    Right elbow pain    Recurrent erosion of cornea, right eye    PTSD (post-traumatic stress disorder)    Peripheral vascular disease (CMS/[facility])     Palpitations    Senile incipient cataract    Pain in left knee    Osteoarthritis of left knee    Open wound of finger with tendon involvement    Neck strain    Muscle weakness    Mild intermittent asthma without complication    Lumbar radiculopathy    Leg cramping    Laceration of left thumb without foreign body without damage to nail    Injury of foot    Iatrogenic pneumothorax    Hypertriglyceridemia    Mixed hyperlipidemia    Hyperosmolar non-ketotic state due to type 2 diabetes mellitus (CMS/HCC)    Gastroesophageal reflux disease    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    Dizziness    Essential hypertension    Disc degeneration, lumbar    Diabetic neuropathy (CMS/HCC)    Dehydration    Degenerative joint disease (DJD) of lumbar spine    Cubital tunnel syndrome on right    Entrapment of right ulnar nerve    Lesion of ulnar nerve    Coronary artery disease    Corneal rust ring of right eye    Foreign body in cornea, left eye, subsequent encounter    Complete tear of right rotator cuff    Incomplete tear of right rotator cuff    Combined form of senile cataract of both eyes    Closed fracture of phalanx of foot    Chronic calculous cholecystitis    Candidiasis of mouth    Bilateral dry eyes    Carpal tunnel syndrome of right wrist    Benign localized prostatic hyperplasia with lower urinary tract symptoms (LUTS)    Asthma    Atelectasis    Asthenia    Artificial knee joint present    Arthritis of right elbow    Acute anxiety    Abdominal pain    Angina pectoris (CMS/HCC)    Chest pain    Altered metabolism due to diabetes (CMS/HCC)    Acute pulmonary edema (CMS/HCC)    Accidental fall    Altered cardiopulmonary tissue perfusion    Cervical radiculopathy    Myofascial pain    Blepharitis of right upper eyelid    Cellulitis    Chronic pain    Clouded consciousness    Cough    Depression    Herniated disc, cervical    History of depression    History of hearing loss    History of hypertension    History of migraine    Low  back pain    Lumbar herniated disc    Migraines    Obesity with body mass index 30 or greater    Plantar fasciitis of left foot    Tenosynovitis of left foot    Shortness of breath    Spondylosis without myelopathy or radiculopathy, cervical region    Spondylosis without myelopathy or radiculopathy, lumbosacral region    Sprain of left knee    Status post colostomy (CMS/HCC)    Steatosis of liver    Systolic heart failure (CMS/HCC)    Tear of rotator cuff    Tobacco use disorder    Diabetes mellitus type 2 in obese (CMS/HCC)    Dry eye syndrome of left lacrimal gland    Vasovagal syncope       Diagnoses and all orders for this visit:  Bronchitis  -     predniSONE (Deltasone) 20 mg tablet; Take 2 tablets (40 mg) by mouth once daily for 5 days.  -     azithromycin (Zithromax) 250 mg tablet; Take 2 tablets (500 mg) by mouth once daily for 1 day, THEN 1 tablet (250 mg) once daily for 4 days. Take 2 tabs (500 mg) by mouth today, than 1 daily for 4 days..      The patient was encouraged to ensure that any/all documentation is accurate and up to date, and that our office be provided a copy in the event that anything changes.         Sukhjinder Mandujano MD

## 2024-02-12 ENCOUNTER — TELEPHONE (OUTPATIENT)
Dept: PRIMARY CARE | Facility: CLINIC | Age: 59
End: 2024-02-12
Payer: MEDICARE

## 2024-02-12 NOTE — TELEPHONE ENCOUNTER
"Patient said he noticed he has \"wheezing\" in his left lung when he's laying down. He said he mentioned it to you last time he was in, he took antibiotics per you request and they did not do anything for him.   "

## 2024-02-13 DIAGNOSIS — J40 BRONCHITIS: Primary | ICD-10-CM

## 2024-02-13 RX ORDER — METHYLPREDNISOLONE 4 MG/1
TABLET ORAL
Qty: 21 TABLET | Refills: 0 | Status: SHIPPED | OUTPATIENT
Start: 2024-02-13 | End: 2024-02-20

## 2024-02-14 ENCOUNTER — HOSPITAL ENCOUNTER (OUTPATIENT)
Dept: RADIOLOGY | Facility: HOSPITAL | Age: 59
Discharge: HOME | End: 2024-02-14
Payer: MEDICARE

## 2024-02-14 DIAGNOSIS — J40 BRONCHITIS: ICD-10-CM

## 2024-02-14 PROCEDURE — 71046 X-RAY EXAM CHEST 2 VIEWS: CPT

## 2024-02-19 ENCOUNTER — OFFICE VISIT (OUTPATIENT)
Dept: ORTHOPEDIC SURGERY | Facility: CLINIC | Age: 59
End: 2024-02-19
Payer: MEDICARE

## 2024-02-19 ENCOUNTER — HOSPITAL ENCOUNTER (OUTPATIENT)
Dept: RADIOLOGY | Facility: HOSPITAL | Age: 59
Discharge: HOME | End: 2024-02-19
Payer: MEDICARE

## 2024-02-19 DIAGNOSIS — G56.21 CUBITAL TUNNEL SYNDROME ON RIGHT: ICD-10-CM

## 2024-02-19 DIAGNOSIS — M25.561 ACUTE PAIN OF RIGHT KNEE: ICD-10-CM

## 2024-02-19 DIAGNOSIS — M75.121 COMPLETE TEAR OF RIGHT ROTATOR CUFF, UNSPECIFIED WHETHER TRAUMATIC: Primary | ICD-10-CM

## 2024-02-19 DIAGNOSIS — Z87.891 PERSONAL HISTORY OF TOBACCO USE: ICD-10-CM

## 2024-02-19 PROCEDURE — 99213 OFFICE O/P EST LOW 20 MIN: CPT | Performed by: ORTHOPAEDIC SURGERY

## 2024-02-19 PROCEDURE — 71271 CT THORAX LUNG CANCER SCR C-: CPT

## 2024-02-19 ASSESSMENT — ENCOUNTER SYMPTOMS
CHILLS: 0
FATIGUE: 0
ARTHRALGIAS: 1
WHEEZING: 0
FEVER: 0
SHORTNESS OF BREATH: 0

## 2024-02-19 ASSESSMENT — PAIN SCALES - GENERAL: PAINLEVEL_OUTOF10: 5 - MODERATE PAIN

## 2024-02-19 ASSESSMENT — PAIN - FUNCTIONAL ASSESSMENT: PAIN_FUNCTIONAL_ASSESSMENT: 0-10
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
## 2024-02-23 ENCOUNTER — TELEPHONE (OUTPATIENT)
Dept: PRIMARY CARE | Facility: CLINIC | Age: 59
End: 2024-02-23
Payer: MEDICARE

## 2024-02-23 NOTE — TELEPHONE ENCOUNTER
FYI - pt called just to update that he is feeling much better - he states he is no longer having any bronchitis sx - he thanks you.

## 2024-02-24 DIAGNOSIS — R91.1 PULMONARY NODULE: Primary | ICD-10-CM

## 2024-02-26 NOTE — TELEPHONE ENCOUNTER
Patient said he is feeling better and had a CT ordered on Saturday, he is wondering if this is something he still needs to have done?

## 2024-02-27 DIAGNOSIS — J45.20 MILD INTERMITTENT ASTHMA WITHOUT COMPLICATION (HHS-HCC): ICD-10-CM

## 2024-02-28 RX ORDER — ALBUTEROL SULFATE 90 UG/1
AEROSOL, METERED RESPIRATORY (INHALATION)
Qty: 18 G | Refills: 11 | Status: SHIPPED | OUTPATIENT
Start: 2024-02-28

## 2024-03-05 ENCOUNTER — APPOINTMENT (OUTPATIENT)
Dept: PHYSICAL THERAPY | Facility: CLINIC | Age: 59
End: 2024-03-05
Payer: MEDICARE

## 2024-03-11 ENCOUNTER — LAB (OUTPATIENT)
Dept: LAB | Facility: LAB | Age: 59
End: 2024-03-11
Payer: MEDICARE

## 2024-03-11 DIAGNOSIS — E11.9 ALTERED METABOLISM DUE TO DIABETES (MULTI): ICD-10-CM

## 2024-03-11 LAB
ALBUMIN SERPL-MCNC: 4.5 G/DL (ref 3.5–5)
ALP BLD-CCNC: 80 U/L (ref 35–125)
ALT SERPL-CCNC: 18 U/L (ref 5–40)
ANION GAP SERPL CALC-SCNC: 15 MMOL/L
AST SERPL-CCNC: 15 U/L (ref 5–40)
BILIRUB SERPL-MCNC: 0.3 MG/DL (ref 0.1–1.2)
BUN SERPL-MCNC: 17 MG/DL (ref 8–25)
CALCIUM SERPL-MCNC: 9.6 MG/DL (ref 8.5–10.4)
CHLORIDE SERPL-SCNC: 98 MMOL/L (ref 97–107)
CO2 SERPL-SCNC: 22 MMOL/L (ref 24–31)
CREAT SERPL-MCNC: 0.8 MG/DL (ref 0.4–1.6)
EGFRCR SERPLBLD CKD-EPI 2021: >90 ML/MIN/1.73M*2
EST. AVERAGE GLUCOSE BLD GHB EST-MCNC: 209 MG/DL
GLUCOSE SERPL-MCNC: 186 MG/DL (ref 65–99)
HBA1C MFR BLD: 8.9 %
POTASSIUM SERPL-SCNC: 4.3 MMOL/L (ref 3.4–5.1)
PROT SERPL-MCNC: 6.8 G/DL (ref 5.9–7.9)
SODIUM SERPL-SCNC: 135 MMOL/L (ref 133–145)

## 2024-03-11 PROCEDURE — 36415 COLL VENOUS BLD VENIPUNCTURE: CPT

## 2024-03-11 PROCEDURE — 83036 HEMOGLOBIN GLYCOSYLATED A1C: CPT

## 2024-03-11 PROCEDURE — 80053 COMPREHEN METABOLIC PANEL: CPT

## 2024-03-12 ENCOUNTER — EVALUATION (OUTPATIENT)
Dept: PHYSICAL THERAPY | Facility: CLINIC | Age: 59
End: 2024-03-12
Payer: MEDICARE

## 2024-03-12 DIAGNOSIS — M47.817 SPONDYLOSIS WITHOUT MYELOPATHY OR RADICULOPATHY, LUMBOSACRAL REGION: ICD-10-CM

## 2024-03-12 PROCEDURE — 97162 PT EVAL MOD COMPLEX 30 MIN: CPT | Mod: GP | Performed by: PHYSICAL THERAPIST

## 2024-03-12 ASSESSMENT — ENCOUNTER SYMPTOMS
DEPRESSION: 0
OCCASIONAL FEELINGS OF UNSTEADINESS: 0
LOSS OF SENSATION IN FEET: 0

## 2024-03-12 NOTE — PROGRESS NOTES
"Physical Therapy Evaluation and Treatment      Patient Name: Orlin Laguerre  MRN: [id number]  Today's Date: 3/12/2024  Time Calculation  Start Time: 1440  Stop Time: 1514  Time Calculation (min): 34 min    Insurance:  Visit number: 1 of 6  Authorization info: ) EVAL ONLY - [employer] - AUTH THRU [employer] / 80% COVERAGE / OOP [id number] - $399 MET 2) [employer]UAL - 100% CONTRACTED RATE    Insurance Type: Payor: [employer] MEDICARE / Plan: [employer] GOLD CHOICE / Product Type: *No Product type* /     Current Problem:   1. Spondylosis without myelopathy or radiculopathy, lumbosacral region  PT eval and treat    Follow Up In Physical Therapy          Subjective    General:  Pt presents to therapy due to neck pain. He states it is getting worse and attributes it to herniated discs. Finds when stretching he gets increased pain. Has also had some dizziness, noticed this when changing light bulbs. A lot of tightness in the neck as well. Has trouble sleeping at night, tries to use pillow to help with positioning. Has found traction to help in the past. Has home unit but \"does not have the time.\" Tingling in right hand from elbow issues. No recent injections.       Precautions: None  Pain: 5/10       Objective   ROM   Cervical spine AROM:  Flex 90%  Ext 25%  Lat flex L/R 50%  Rot L/R 75%  *Pain with extension and lat flex      MMT   Bilat UE strength:  Sh flex 4/5  Sh ABD 4/5  Elbow ext 4+/5  Elbow flex 4+/5  *Pain with all, R>L    Palpation   TTP cervical vertebrae and suboccipitals   TTP bilat UT      Flexibility   Moderate tightness in bilat UT    Special Tests   Compression: Negative   Lat flex L with Ext: Positive  Lat flex R with Ext: Positive       Outcome Measures:  Low Back Disability / Oswestry: 17/50      Treatments:  Therapeutic Exercise for HEP:  Cervical retraction  Palms out stretch  Cervical retraction  Scap retraction      Assessment   Assessment:   Pt is a 58 y.o. male with cervicalgia. Pt with decreased ROM, " reduced strength, numbness/tingling, posture deficits, and flexibility limitations. Pt will benefit from skilled PT to address the above deficits for improvement in functional activities.       Moderate complexity due to patient's clinical presentation being evolving with changing characteristics, with comorbidities to include OA, Diabetes, Heart disease, Asthma, all of which may negatively impact rehab tolerance and progression.     Plan:   Treatment/Interventions: Dry needling, Education/ Instruction, Manual therapy, Mechanical traction, Self care/ home management, Therapeutic activities, Therapeutic exercises  PT Plan: Skilled PT  PT Frequency: 1 time per week  Duration: 5 more visits  Rehab Potential: Fair  Plan of Care Agreement: Patient      Goals:   Active       Mobility       Goal 1       Start:  03/12/24    Expected End:  06/10/24       Pt will improve cervical spine AROM to WNL to improve I/ADLs.         Goal 2       Start:  03/12/24    Expected End:  06/10/24       Pt will improve bilat UE strength to 5/5 to improve I/ADLs.            Pain       Goal 1       Start:  03/12/24    Expected End:  06/10/24       Pt will perform all household activities with <3/10 pain.         Goal 2       Start:  03/12/24    Expected End:  06/10/24       Pt will improve all UE flexibility to WNL and <3/10 pain to improve I/ADLs.

## 2024-03-14 ENCOUNTER — OFFICE VISIT (OUTPATIENT)
Dept: PRIMARY CARE | Facility: CLINIC | Age: 59
End: 2024-03-14
Payer: MEDICARE

## 2024-03-14 VITALS
OXYGEN SATURATION: 97 % | HEART RATE: 80 BPM | HEIGHT: 71 IN | SYSTOLIC BLOOD PRESSURE: 126 MMHG | WEIGHT: 210 LBS | DIASTOLIC BLOOD PRESSURE: 78 MMHG | BODY MASS INDEX: 29.4 KG/M2

## 2024-03-14 DIAGNOSIS — E11.9 ALTERED METABOLISM DUE TO DIABETES (MULTI): Primary | ICD-10-CM

## 2024-03-14 PROCEDURE — 3052F HG A1C>EQUAL 8.0%<EQUAL 9.0%: CPT | Performed by: FAMILY MEDICINE

## 2024-03-14 PROCEDURE — 3074F SYST BP LT 130 MM HG: CPT | Performed by: FAMILY MEDICINE

## 2024-03-14 PROCEDURE — 99213 OFFICE O/P EST LOW 20 MIN: CPT | Performed by: FAMILY MEDICINE

## 2024-03-14 PROCEDURE — 3078F DIAST BP <80 MM HG: CPT | Performed by: FAMILY MEDICINE

## 2024-03-14 ASSESSMENT — PAIN SCALES - GENERAL: PAINLEVEL: 5

## 2024-03-14 ASSESSMENT — ENCOUNTER SYMPTOMS
SHORTNESS OF BREATH: 0
POLYDIPSIA: 0

## 2024-03-14 NOTE — PROGRESS NOTES
"[facility]: [facility]  E/M EVALUATION    Orlin Laguerre is a 58 y.o. male who presents for Follow-up.    Subjective   Pt here for follow up    DM- not controlled.  170 currently.        Review of Systems   Respiratory:  Negative for shortness of breath.    Cardiovascular:  Negative for chest pain.   Endocrine: Negative for polydipsia and polyuria.       Objective   Vitals:    03/14/24 1319   BP: 126/78   Pulse: 80   SpO2: 97%     Physical Exam  Constitutional:       Appearance: Normal appearance.   Cardiovascular:      Rate and Rhythm: Normal rate and regular rhythm.      Heart sounds: No murmur heard.  Pulmonary:      Effort: Pulmonary effort is normal.      Breath sounds: Normal breath sounds.   Neurological:      Mental Status: He is alert.       Cholesterol   Date Value Ref Range Status   09/06/2023 139 133 - 200 MG/DL Final     Triglycerides   Date Value Ref Range Status   09/06/2023 486 (H) 40 - 150 MG/DL Final     HDL   Date Value Ref Range Status   09/06/2023 40 (L) >40 MG/DL Final     Comment:     National Cholesterol Education Program(NCFP)guidelines:   <40 mg/dl:Low HDL-cholesterol(major risk factor for CHD)   >60 mg/dl:High HDL-cholesterol(\"negative\"risk factor for CHD)   HDL-cholesterol is affected by a number of factors,e.g.,smoking,  exercise,hormones,sex and age.       LDL Calculated   Date Value Ref Range Status   09/06/2023  65 - 130 MG/DL Final    Unable to report calculated LDL due to increased triglycerides. Direct     Comment:      LDL to be run.     Cholesterol/HDL Ratio   Date Value Ref Range Status   09/06/2023 3.5 RATIO Final     Comment:     According to the American Heart Association, the goal is to maintain   the total cholesterol/HDL ratio at 5-to-1 or lower with an optimum   ratio of 3.5-to-1.  Performed at [facility] [id number] [place] [id number]       Glucose   Date Value Ref Range Status   03/11/2024 186 (H) 65 - 99 mg/dL Final     Sodium   Date Value " Ref Range Status   03/11/2024 135 133 - 145 mmol/L Final     Potassium   Date Value Ref Range Status   03/11/2024 4.3 3.4 - 5.1 mmol/L Final     ALT   Date Value Ref Range Status   03/11/2024 18 5 - 40 U/L Final     eGFR   Date Value Ref Range Status   03/11/2024 >90 >60 mL/min/1.73m*2 Final     Comment:     Calculations of estimated GFR are performed using the 2021 CKD-EPI Study Refit equation without the race variable for the IDMS-Traceable creatinine methods.  https://jasn.asnjournals.org/content/early/2021/09/22/ASN.1236653215     Hemoglobin A1C   Date Value Ref Range Status   03/11/2024 8.9 (H) See below % Final     Thyroid Stimulating Hormone   Date Value Ref Range Status   01/18/2022 1.45 0.27 - 4.20 MIU/L Final     Comment:     Performed at [address] [address]       Assessment/Plan      Patient Active Problem List   Diagnosis    Sepsis (CMS/HCC)    Sciatica, left side    Acute pain of right shoulder    Right elbow pain    Recurrent erosion of cornea, right eye    PTSD (post-traumatic stress disorder)    Peripheral vascular disease (CMS/HCC)    Palpitations    Senile incipient cataract    Pain in left knee    Osteoarthritis of left knee    Open wound of finger with tendon involvement    Neck strain    Muscle weakness    Mild intermittent asthma without complication    Lumbar radiculopathy    Leg cramping    Laceration of left thumb without foreign body without damage to nail    Injury of foot    Iatrogenic pneumothorax    Hypertriglyceridemia    Mixed hyperlipidemia    Hyperosmolar non-ketotic state due to type 2 diabetes mellitus (CMS/HCC)    Gastroesophageal reflux disease    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    Dizziness    Essential hypertension    Disc degeneration, lumbar    Diabetic neuropathy (CMS/HCC)    Dehydration    Degenerative joint disease (DJD) of lumbar spine    Cubital tunnel syndrome on right    Entrapment of right ulnar nerve    Lesion of ulnar nerve    Coronary artery  disease    Corneal rust ring of right eye    Foreign body in cornea, left eye, subsequent encounter    Complete tear of right rotator cuff    Incomplete tear of right rotator cuff    Combined form of senile cataract of both eyes    Closed fracture of phalanx of foot    Chronic calculous cholecystitis    Candidiasis of mouth    Bilateral dry eyes    Carpal tunnel syndrome of right wrist    Benign localized prostatic hyperplasia with lower urinary tract symptoms (LUTS)    Asthma    Atelectasis    Asthenia    Artificial knee joint present    Arthritis of right elbow    Acute anxiety    Abdominal pain    Angina pectoris (CMS/HCC)    Chest pain    Altered metabolism due to diabetes (CMS/HCC)    Acute pulmonary edema (CMS/HCC)    Accidental fall    Altered cardiopulmonary tissue perfusion    Cervical radiculopathy    Myofascial pain    Blepharitis of right upper eyelid    Cellulitis    Chronic pain    Clouded consciousness    Cough    Depression    Herniated disc, cervical    History of depression    History of hearing loss    History of hypertension    History of migraine    Low back pain    Lumbar herniated disc    Migraines    Obesity with body mass index 30 or greater    Plantar fasciitis of left foot    Tenosynovitis of left foot    Shortness of breath    Spondylosis without myelopathy or radiculopathy, cervical region    Spondylosis without myelopathy or radiculopathy, lumbosacral region    Sprain of left knee    Status post colostomy (CMS/HCC)    Steatosis of liver    Systolic heart failure (CMS/HCC)    Tear of rotator cuff    Tobacco use disorder    Diabetes mellitus type 2 in obese (CMS/HCC)    Dry eye syndrome of left lacrimal gland    Vasovagal syncope       Diagnoses and all orders for this visit:  Altered metabolism due to diabetes (CMS/HCC)  Adjust mealtime insulin 1 unit more per each scale.      The patient was encouraged to ensure that any/all documentation is accurate and up to date, and that our office  be provided a copy in the event that anything changes.         Sukhjinder Mandujano MD

## 2024-03-21 ENCOUNTER — APPOINTMENT (OUTPATIENT)
Dept: PHYSICAL THERAPY | Facility: CLINIC | Age: 59
End: 2024-03-21
Payer: MEDICARE

## 2024-04-01 ENCOUNTER — TREATMENT (OUTPATIENT)
Dept: PHYSICAL THERAPY | Facility: CLINIC | Age: 59
End: 2024-04-01
Payer: MEDICARE

## 2024-04-01 DIAGNOSIS — M47.817 SPONDYLOSIS WITHOUT MYELOPATHY OR RADICULOPATHY, LUMBOSACRAL REGION: ICD-10-CM

## 2024-04-01 PROCEDURE — 97110 THERAPEUTIC EXERCISES: CPT | Mod: GP,CQ

## 2024-04-01 PROCEDURE — 97012 MECHANICAL TRACTION THERAPY: CPT | Mod: GP,CQ

## 2024-04-01 ASSESSMENT — PAIN SCALES - GENERAL: PAINLEVEL_OUTOF10: 5 - MODERATE PAIN

## 2024-04-01 NOTE — PROGRESS NOTES
"Physical Therapy Treatment    Patient Name: Orlin Laguerre  MRN: [id number]  Today's Date: 4/1/2024  Time Calculation  Start Time: 1345  Stop Time: 1435  Time Calculation (min): 50 min  PT Modalities Time Entry  Hot/Cold Pack Time Entry: 15  Mechanical Traction Time Entry: 15  PT Therapeutic Procedures Time Entry  Therapeutic Exercise Time Entry: 26    Insurance:  Visit number: 2 of 5  Authorization info: 3/12/2024 - 3/12/2025   Insurance Type: Payor: HUMANA MEDICARE / Plan: HUMANA GOLD CHOICE / Product Type: *No Product type* /     Current Problem   1. Spondylosis without myelopathy or radiculopathy, lumbosacral region  Follow Up In Physical Therapy          Subjective   Pt reports moderate neck pain, tightness on arrival.    General   General Comment: Cervical spine pain  Precautions:  Precautions  Precautions Comment: Min fall risk  Pain   Pain Score: 5 - Moderate pain  Pain Location: Neck  Pain Orientation: Posterior, Right, Left  Pain Frequency: Intermittent  Pain Onset: Ongoing  Pain Interventions: Heat applied, Traction  Post Treatment Pain Level 3/10    Objective   Pt requires frequent initial postural cueing.    Treatments:  Therapeutic Exercise:  Therapeutic Exercise  Therapeutic Exercise Performed: Yes  Therapeutic Exercise Activity 1: Nustep L5, 7'  Therapeutic Exercise Activity 2: Scap retractions 5\" hold 2x10  Therapeutic Exercise Activity 3: Rows GTB 2x10  Therapeutic Exercise Activity 4: Sh ext GTB 2x10  Therapeutic Exercise Activity 5: B ER GTB 2x10  Therapeutic Exercise Activity 6: Utrap stretches 20\"x3 L/R each  Therapeutic Exercise Activity 7: Levator stretches 20\"x3 l/R each  Modalities  Mechanical Cervical Traction: Intermittent: 17 lbs and 60 seconds on, 11 lbs and 20 seconds off, for 15 minutes in Supine, with wedge     Assessment   Assessment:   PT Assessment  PT Assessment Results: Decreased strength, Decreased range of motion, Decreased mobility, Pain  Rehab Prognosis: " Fair  Evaluation/Treatment Tolerance: Patient tolerated treatment well  Assessment Comment: Pt demonstrates poor to fair postural control, improves with performance.    Plan:   OP PT Plan  Treatment/Interventions: Dry needling, Education/ Instruction, Manual therapy, Mechanical traction, Self care/ home management, Therapeutic activities, Therapeutic exercises  PT Plan: Skilled PT  PT Frequency: 1 time per week  Duration: 5 more visits  Rehab Potential: Fair  Plan of Care Agreement: Patient    OP EDUCATION:  Outpatient Education  Individual(s) Educated: Patient  Education Provided: Body Mechanics, Home Exercise Program  Patient/Caregiver Demonstrated Understanding: yes  Patient Response to Education: Patient/Caregiver Verbalized Understanding of Information, Patient/Caregiver Performed Return Demonstration of Exercises/Activities, Patient/Caregiver Asked Appropriate Questions    Goals:   Active       Mobility       Goal 1       Start:  03/12/24    Expected End:  06/10/24       Pt will improve cervical spine AROM to WNL to improve I/ADLs.         Goal 2       Start:  03/12/24    Expected End:  06/10/24       Pt will improve bilat UE strength to 5/5 to improve I/ADLs.            Pain       Goal 1       Start:  03/12/24    Expected End:  06/10/24       Pt will perform all household activities with <3/10 pain.         Goal 2       Start:  03/12/24    Expected End:  06/10/24       Pt will improve all UE flexibility to WNL and <3/10 pain to improve I/ADLs.

## 2024-04-02 ENCOUNTER — TELEPHONE (OUTPATIENT)
Dept: PRIMARY CARE | Facility: CLINIC | Age: 59
End: 2024-04-02
Payer: MEDICARE

## 2024-04-03 DIAGNOSIS — E11.9 ALTERED METABOLISM DUE TO DIABETES (MULTI): Primary | ICD-10-CM

## 2024-04-03 NOTE — TELEPHONE ENCOUNTER
Spoke with patient, he said he did research himself and it said the OMNIPOD could be for type 2 diabetics. He said he would see an endocrinologist if a referral was placed.

## 2024-04-05 DIAGNOSIS — E11.9 ALTERED METABOLISM DUE TO DIABETES (MULTI): Primary | ICD-10-CM

## 2024-04-08 RX ORDER — INSULIN ASPART 100 [IU]/ML
INJECTION, SOLUTION INTRAVENOUS; SUBCUTANEOUS
Qty: 30 ML | Refills: 3 | Status: SHIPPED | OUTPATIENT
Start: 2024-04-08

## 2024-04-10 ENCOUNTER — APPOINTMENT (OUTPATIENT)
Dept: PHYSICAL THERAPY | Facility: CLINIC | Age: 59
End: 2024-04-10
Payer: MEDICARE

## 2024-04-15 ENCOUNTER — APPOINTMENT (OUTPATIENT)
Dept: PHYSICAL THERAPY | Facility: CLINIC | Age: 59
End: 2024-04-15
Payer: MEDICARE

## 2024-04-22 ENCOUNTER — HOSPITAL ENCOUNTER (OUTPATIENT)
Dept: RADIOLOGY | Facility: HOSPITAL | Age: 59
Discharge: HOME | End: 2024-04-22
Payer: MEDICARE

## 2024-04-22 ENCOUNTER — TREATMENT (OUTPATIENT)
Dept: PHYSICAL THERAPY | Facility: CLINIC | Age: 59
End: 2024-04-22
Payer: MEDICARE

## 2024-04-22 DIAGNOSIS — M47.817 SPONDYLOSIS WITHOUT MYELOPATHY OR RADICULOPATHY, LUMBOSACRAL REGION: ICD-10-CM

## 2024-04-22 DIAGNOSIS — R91.1 PULMONARY NODULE: ICD-10-CM

## 2024-04-22 PROCEDURE — 97110 THERAPEUTIC EXERCISES: CPT | Mod: GP | Performed by: PHYSICAL THERAPIST

## 2024-04-22 PROCEDURE — 71250 CT THORAX DX C-: CPT

## 2024-04-22 PROCEDURE — 97012 MECHANICAL TRACTION THERAPY: CPT | Mod: GP | Performed by: PHYSICAL THERAPIST

## 2024-04-22 PROCEDURE — 71250 CT THORAX DX C-: CPT | Performed by: RADIOLOGY

## 2024-04-22 NOTE — PROGRESS NOTES
"Physical Therapy Treatment    Patient Name: Orlin Laguerre  MRN: [id number]  Today's Date: 4/22/2024  Time Calculation  Start Time: 1452  Stop Time: 1532  Time Calculation (min): 40 min  PT Modalities Time Entry  Mechanical Traction Time Entry: 15  PT Therapeutic Procedures Time Entry  Therapeutic Exercise Time Entry: 25    Insurance:  Visit number: 3 of 5  Authorization info: 3/12/2024 - 05/31/2024 6 visits total approved   Insurance Type: Payor: HUMANA MEDICARE / Plan: HUMANA GOLD CHOICE / Product Type: *No Product type* /    Current Problem   1. Spondylosis without myelopathy or radiculopathy, lumbosacral region  Follow Up In Physical Therapy          Subjective   General   Pt states he is still having some trouble turning his neck with the bones catching and making noises.       Precautions: None  Pain 7/10  Post Treatment Pain Level \"traction felt nice\"    Objective   Neural findings: negative, in bilateral UE with median/ulnar/radial nerve    Treatments:  Therapeutic Exercise  Therapeutic Exercise Activity 1: UBE x5 minutes   Therapeutic Exercise Activity 3: Rows GTB 2x10  Therapeutic Exercise Activity 4: Sh ext GTB 2x10  Therapeutic Exercise Activity 5: B ER GTB 2x10  Therapeutic Exercise Activity 6: Sh punch GTB 2x10     Modalities  Mechanical Cervical Traction: Intermittent: 17 lbs and 60 seconds on, 11 lbs and 20 seconds off, for 15 minutes in Supine, with wedge       Assessment   Assessment:   Pt with decreasing pain levels following traction this date, will continue at subsequent visits. Slightly more difficulty with right UE based motions vs left due to prior shoulder surgeries.     Plan: Continue with traction    OP EDUCATION: Posture    Goals:   Active       Mobility       Goal 1       Start:  03/12/24    Expected End:  06/10/24       Pt will improve cervical spine AROM to WNL to improve I/ADLs.         Goal 2       Start:  03/12/24    Expected End:  06/10/24       Pt will improve bilat UE strength " to 5/5 to improve I/ADLs.            Pain       Goal 1       Start:  03/12/24    Expected End:  06/10/24       Pt will perform all household activities with <3/10 pain.         Goal 2       Start:  03/12/24    Expected End:  06/10/24       Pt will improve all UE flexibility to WNL and <3/10 pain to improve I/ADLs.

## 2024-04-25 ENCOUNTER — TELEPHONE (OUTPATIENT)
Dept: PRIMARY CARE | Facility: CLINIC | Age: 59
End: 2024-04-25
Payer: MEDICARE

## 2024-04-25 NOTE — TELEPHONE ENCOUNTER
----- Message from Sukhjinder Mandujano MD sent at 4/25/2024 10:41 AM EDT -----  Ct is stable. Repeat 1 year

## 2024-04-29 ENCOUNTER — APPOINTMENT (OUTPATIENT)
Dept: PHYSICAL THERAPY | Facility: CLINIC | Age: 59
End: 2024-04-29
Payer: MEDICARE

## 2024-04-29 DIAGNOSIS — R00.2 PALPITATIONS: ICD-10-CM

## 2024-04-29 DIAGNOSIS — I10 ESSENTIAL HYPERTENSION: ICD-10-CM

## 2024-04-29 RX ORDER — ATENOLOL AND CHLORTHALIDONE TABLET 100; 25 MG/1; MG/1
1 TABLET ORAL DAILY
Qty: 90 TABLET | Refills: 3 | Status: SHIPPED | OUTPATIENT
Start: 2024-04-29

## 2024-04-29 NOTE — TELEPHONE ENCOUNTER
Pharmacy is requesting a refill on behalf of the pt       Requested Prescriptions     Pending Prescriptions Disp Refills    atenoloL-chlorthalidone (Tenoretic) 100-25 mg tablet [Pharmacy Med Name: ATENOLOL/CHLORTHALIDONE 100-25 MG Tablet] 90 tablet 3     Sig: TAKE 1 TABLET EVERY DAY

## 2024-05-08 ENCOUNTER — TREATMENT (OUTPATIENT)
Dept: PHYSICAL THERAPY | Facility: CLINIC | Age: 59
End: 2024-05-08
Payer: MEDICARE

## 2024-05-08 DIAGNOSIS — M47.817 SPONDYLOSIS WITHOUT MYELOPATHY OR RADICULOPATHY, LUMBOSACRAL REGION: ICD-10-CM

## 2024-05-08 PROCEDURE — 97110 THERAPEUTIC EXERCISES: CPT | Mod: GP,CQ

## 2024-05-08 PROCEDURE — 97012 MECHANICAL TRACTION THERAPY: CPT | Mod: GP,CQ

## 2024-05-08 ASSESSMENT — PAIN SCALES - GENERAL: PAINLEVEL_OUTOF10: 6

## 2024-05-08 NOTE — PROGRESS NOTES
"Physical Therapy Treatment    Patient Name: Orlin Laguerre  MRN: [id number]  Today's Date: 5/8/2024  Time Calculation  Start Time: 1345  Stop Time: 1435  Time Calculation (min): 50 min  PT Modalities Time Entry  Hot/Cold Pack Time Entry: 15  Mechanical Traction Time Entry: 15  PT Therapeutic Procedures Time Entry  Therapeutic Exercise Time Entry: 28    Insurance:  Visit number: 4 of 5  Authorization info: 1) EVAL ONLY - HU[employer]NA MEDICARE - AUTH THRU [employer] / 80% COVERAGE / OOP $8850 - $399 MET 2) [place] DUAL - 100% CONTRACTED RATE /  Insurance Type: Payor: HUMANA MEDICARE / Plan: HUMANA GOLD CHOICE / Product Type: *No Product type* /     Current Problem   1. Spondylosis without myelopathy or radiculopathy, lumbosacral region  Follow Up In Physical Therapy          Subjective   General   General Comment: Cervical spine pain  Precautions:  Precautions  Precautions Comment: Min fall risk  Pain   Pain Score: 6  Pain Location: Neck  Pain Orientation: Right, Posterior  Pain Radiating Towards: R shoulder, upper back  Pain Onset: Ongoing  Pain Interventions: Heat applied, Traction  Post Treatment Pain Level 3-4/10    Objective   Pt requires occasional postural cueing during ther ex activities.    Treatments:  Therapeutic Exercise:  Therapeutic Exercise  Therapeutic Exercise Performed: Yes  Therapeutic Exercise Activity 1: UBE BWD/FWD 3 mins each  Therapeutic Exercise Activity 2: Scap retractions 5\" hold 2x10  Therapeutic Exercise Activity 3: Rows GTB 2x10  Therapeutic Exercise Activity 4: Sh ext GTB 2x10  Therapeutic Exercise Activity 5: B ER GTB 2x10  Therapeutic Exercise Activity 6: Utrap stretches 20\"x3 L/R each  Therapeutic Exercise Activity 7: Levator stretches 20\"x3 l/R each    Modalities  Mechanical Cervical Traction: Intermittent: 18 lbs and 20 seconds on, 10 lbs and 20 seconds off, for 15 minutes in Supine, with wedge  Moist heat pack applied to Utraps during traction      Assessment   Assessment:   PT " Assessment  PT Assessment Results: Decreased strength, Decreased range of motion, Decreased mobility, Pain  Rehab Prognosis: Fair  Evaluation/Treatment Tolerance: Patient tolerated treatment well  Assessment Comment: Pt demonstrates improvede postural awareness to fair this visit, decreased pain level after MH and traction    Plan:   OP PT Plan  Treatment/Interventions: Dry needling, Education/ Instruction, Manual therapy, Mechanical traction, Self care/ home management, Therapeutic activities, Therapeutic exercises  PT Plan: Skilled PT  PT Frequency: 1 time per week  Duration: 5 more visits  Rehab Potential: Fair  Plan of Care Agreement: Patient    OP EDUCATION:  Outpatient Education  Individual(s) Educated: Patient  Education Provided: Home Exercise Program, Body Mechanics  Patient/Caregiver Demonstrated Understanding: yes  Patient Response to Education: Patient/Caregiver Verbalized Understanding of Information, Patient/Caregiver Performed Return Demonstration of Exercises/Activities, Patient/Caregiver Asked Appropriate Questions    Goals:   Active       Mobility       Goal 1       Start:  03/12/24    Expected End:  06/10/24       Pt will improve cervical spine AROM to WNL to improve I/ADLs.         Goal 2       Start:  03/12/24    Expected End:  06/10/24       Pt will improve bilat UE strength to 5/5 to improve I/ADLs.            Pain       Goal 1       Start:  03/12/24    Expected End:  06/10/24       Pt will perform all household activities with <3/10 pain.         Goal 2       Start:  03/12/24    Expected End:  06/10/24       Pt will improve all UE flexibility to WNL and <3/10 pain to improve I/ADLs.

## 2024-05-13 ENCOUNTER — APPOINTMENT (OUTPATIENT)
Dept: PHYSICAL THERAPY | Facility: CLINIC | Age: 59
End: 2024-05-13
Payer: MEDICARE

## 2024-05-17 ENCOUNTER — TREATMENT (OUTPATIENT)
Dept: PHYSICAL THERAPY | Facility: CLINIC | Age: 59
End: 2024-05-17
Payer: MEDICARE

## 2024-05-17 DIAGNOSIS — M47.817 SPONDYLOSIS WITHOUT MYELOPATHY OR RADICULOPATHY, LUMBOSACRAL REGION: ICD-10-CM

## 2024-05-17 PROCEDURE — 97110 THERAPEUTIC EXERCISES: CPT | Mod: GP,CQ

## 2024-05-17 PROCEDURE — 97012 MECHANICAL TRACTION THERAPY: CPT | Mod: GP,CQ

## 2024-05-17 ASSESSMENT — PAIN SCALES - GENERAL: PAINLEVEL_OUTOF10: 7

## 2024-05-17 NOTE — PROGRESS NOTES
"Physical Therapy Treatment    Patient Name: Orlin Laguerre  MRN: [id number]  Today's Date: 5/17/2024  Time Calculation  Start Time: 1101  Stop Time: 1150  Time Calculation (min): 49 min  PT Modalities Time Entry  Hot/Cold Pack Time Entry: 15  Mechanical Traction Time Entry: 15  PT Therapeutic Procedures Time Entry  Therapeutic Exercise Time Entry: 31    Insurance:  Visit number: 5 of 5  Authorization info: 1) EVAL ONLY - [employer] MEDICARE - AUTH THRU [employer] / 80% COVERAGE / OOP $8850 - $399 MET 2) [place] DUAL - 100% CONTRACTED RATE /  Insurance Type: Payor: HUMANA MEDICARE / Plan: HUMANA GOLD CHOICE / Product Type: *No Product type* /     Current Problem   1. Spondylosis without myelopathy or radiculopathy, lumbosacral region  Follow Up In Physical Therapy          Subjective   Pt reports ant shoulder pain from running into the wall at home, states no other issues do to this.    General   General Comment: Cervical spine pain  Precautions:  Precautions  Precautions Comment: Min fall risk  Pain   Pain Score: 7  Pain Location: Neck  Pain Orientation: Right, Posterior  Pain Radiating Towards: R shoulder  Pain Onset: Ongoing  Pain Interventions: Heat applied, Traction  Post Treatment Pain Level 5/10    Objective   Pt requires fewer postural cues during there ex today.    Treatments:  Therapeutic Exercise:  Therapeutic Exercise  Therapeutic Exercise Performed: Yes  Therapeutic Exercise Activity 1: UBE BWD/FWD 3 mins each  Therapeutic Exercise Activity 2: Scap retractions 5\" hold 2x10  Therapeutic Exercise Activity 3: Rows BTB 2x10  Therapeutic Exercise Activity 4: Sh ext BTB 2x10  Therapeutic Exercise Activity 5: B ER BTB 2x10  Therapeutic Exercise Activity 6: Utrap stretches 20\"x3 L/R each  Therapeutic Exercise Activity 7: Levator stretches 20\"x3 l/R each  Therapeutic Exercise Activity 8: Waal walk upwith GTB 1x5    Modalities  Mechanical Lumbar Traction: Intermittent: 18 lbs and 60 seconds on, 10 lbs and 20 seconds off, " for 15 minutes in Supine, with wedge  Moist heat pack applied to Upper traps during traction      Assessment   Assessment:   PT Assessment  PT Assessment Results: Decreased strength, Decreased range of motion, Decreased mobility, Pain  Rehab Prognosis: Fair  Evaluation/Treatment Tolerance: Patient tolerated treatment well  Assessment Comment: Pt demonstrates fair to good postural contral after initial cues this visit.  Tolerates treatment with reduced pain level.    Plan:   OP PT Plan  Treatment/Interventions: Dry needling, Education/ Instruction, Manual therapy, Mechanical traction, Self care/ home management, Therapeutic activities, Therapeutic exercises  PT Plan: Skilled PT  PT Frequency: 1 time per week  Duration: 5 more visits  Rehab Potential: Fair  Plan of Care Agreement: Patient    OP EDUCATION:  Outpatient Education  Individual(s) Educated: Patient  Education Provided: Body Mechanics  Patient/Caregiver Demonstrated Understanding: yes  Patient Response to Education: Patient/Caregiver Verbalized Understanding of Information, Patient/Caregiver Asked Appropriate Questions, Patient/Caregiver Performed Return Demonstration of Exercises/Activities    Goals:   Active       Mobility       Goal 1       Start:  03/12/24    Expected End:  06/10/24       Pt will improve cervical spine AROM to WNL to improve I/ADLs.         Goal 2       Start:  03/12/24    Expected End:  06/10/24       Pt will improve bilat UE strength to 5/5 to improve I/ADLs.            Pain       Goal 1       Start:  03/12/24    Expected End:  06/10/24       Pt will perform all household activities with <3/10 pain.         Goal 2       Start:  03/12/24    Expected End:  06/10/24       Pt will improve all UE flexibility to WNL and <3/10 pain to improve I/ADLs.

## 2024-05-18 DIAGNOSIS — K21.9 GASTROESOPHAGEAL REFLUX DISEASE, UNSPECIFIED WHETHER ESOPHAGITIS PRESENT: ICD-10-CM

## 2024-05-21 ENCOUNTER — OFFICE VISIT (OUTPATIENT)
Dept: CARDIOLOGY | Facility: CLINIC | Age: 59
End: 2024-05-21
Payer: MEDICARE

## 2024-05-21 VITALS
DIASTOLIC BLOOD PRESSURE: 68 MMHG | RESPIRATION RATE: 18 BRPM | HEIGHT: 71 IN | TEMPERATURE: 98.9 F | WEIGHT: 211 LBS | SYSTOLIC BLOOD PRESSURE: 109 MMHG | HEART RATE: 70 BPM | BODY MASS INDEX: 29.54 KG/M2 | OXYGEN SATURATION: 98 %

## 2024-05-21 DIAGNOSIS — F17.200 TOBACCO USE DISORDER: ICD-10-CM

## 2024-05-21 DIAGNOSIS — E78.1 HYPERTRIGLYCERIDEMIA: ICD-10-CM

## 2024-05-21 DIAGNOSIS — R00.2 PALPITATIONS: Primary | ICD-10-CM

## 2024-05-21 DIAGNOSIS — I10 ESSENTIAL HYPERTENSION: ICD-10-CM

## 2024-05-21 PROCEDURE — 99213 OFFICE O/P EST LOW 20 MIN: CPT | Performed by: INTERNAL MEDICINE

## 2024-05-21 PROCEDURE — 3078F DIAST BP <80 MM HG: CPT | Performed by: INTERNAL MEDICINE

## 2024-05-21 PROCEDURE — 3052F HG A1C>EQUAL 8.0%<EQUAL 9.0%: CPT | Performed by: INTERNAL MEDICINE

## 2024-05-21 PROCEDURE — 3074F SYST BP LT 130 MM HG: CPT | Performed by: INTERNAL MEDICINE

## 2024-05-21 PROCEDURE — 99407 BEHAV CHNG SMOKING > 10 MIN: CPT | Performed by: INTERNAL MEDICINE

## 2024-05-21 ASSESSMENT — PAIN SCALES - GENERAL: PAINLEVEL: 7

## 2024-05-21 ASSESSMENT — LIFESTYLE VARIABLES
SKIP TO QUESTIONS 9-10: 1
AUDIT TOTAL SCORE: 1
HOW OFTEN DO YOU HAVE A DRINK CONTAINING ALCOHOL: MONTHLY OR LESS
AUDIT-C TOTAL SCORE: 1
HOW MANY STANDARD DRINKS CONTAINING ALCOHOL DO YOU HAVE ON A TYPICAL DAY: 1 OR 2
HAVE YOU OR SOMEONE ELSE BEEN INJURED AS A RESULT OF YOUR DRINKING: NO
HOW OFTEN DO YOU HAVE SIX OR MORE DRINKS ON ONE OCCASION: NEVER
HAS A RELATIVE, FRIEND, DOCTOR, OR ANOTHER HEALTH PROFESSIONAL EXPRESSED CONCERN ABOUT YOUR DRINKING OR SUGGESTED YOU CUT DOWN: NO

## 2024-05-21 ASSESSMENT — ENCOUNTER SYMPTOMS
OCCASIONAL FEELINGS OF UNSTEADINESS: 0
LOSS OF SENSATION IN FEET: 0
DEPRESSION: 0
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
## 2024-05-29 DIAGNOSIS — E78.2 MIXED HYPERLIPIDEMIA: ICD-10-CM

## 2024-05-29 DIAGNOSIS — M54.32 SCIATICA, LEFT SIDE: Primary | ICD-10-CM

## 2024-05-31 DIAGNOSIS — I20.9 ANGINA PECTORIS (CMS-HCC): ICD-10-CM

## 2024-05-31 DIAGNOSIS — R07.9 CHEST PAIN, UNSPECIFIED TYPE: ICD-10-CM

## 2024-05-31 RX ORDER — OMEPRAZOLE 40 MG/1
40 CAPSULE, DELAYED RELEASE ORAL DAILY
Qty: 90 CAPSULE | Refills: 1 | Status: SHIPPED | OUTPATIENT
Start: 2024-05-31

## 2024-05-31 RX ORDER — ROSUVASTATIN CALCIUM 40 MG/1
40 TABLET, COATED ORAL DAILY
Qty: 90 TABLET | Refills: 3 | Status: SHIPPED | OUTPATIENT
Start: 2024-05-31

## 2024-05-31 RX ORDER — NITROGLYCERIN 0.4 MG/1
0.4 TABLET SUBLINGUAL EVERY 5 MIN PRN
Qty: 90 TABLET | Refills: 3 | Status: SHIPPED | OUTPATIENT
Start: 2024-05-31 | End: 2025-05-26

## 2024-05-31 RX ORDER — IBUPROFEN 800 MG/1
TABLET ORAL
Qty: 270 TABLET | Refills: 3 | Status: SHIPPED | OUTPATIENT
Start: 2024-05-31

## 2024-05-31 NOTE — TELEPHONE ENCOUNTER
Orlin Laguerre  has called in to request a refill on his:    nitroglycerin     Medication sent to pharmacy and Orlin Laguerre  will be notified of when available for / has been sent out for delivery        Requested Prescriptions     Pending Prescriptions Disp Refills    nitroglycerin (Nitrostat) 0.4 mg SL tablet 90 tablet 3     Sig: Place 1 tablet (0.4 mg) under the tongue every 5 minutes if needed for chest pain. For 90 days

## 2024-06-03 ENCOUNTER — APPOINTMENT (OUTPATIENT)
Dept: PHYSICAL THERAPY | Facility: CLINIC | Age: 59
End: 2024-06-03
Payer: MEDICARE

## 2024-06-11 ENCOUNTER — HOSPITAL ENCOUNTER (OUTPATIENT)
Dept: CARDIOLOGY | Facility: HOSPITAL | Age: 59
Discharge: HOME | End: 2024-06-11
Payer: MEDICARE

## 2024-06-11 DIAGNOSIS — F17.200 TOBACCO USE DISORDER: ICD-10-CM

## 2024-06-11 DIAGNOSIS — R00.2 PALPITATIONS: ICD-10-CM

## 2024-06-11 DIAGNOSIS — R06.02 SHORTNESS OF BREATH: ICD-10-CM

## 2024-06-11 DIAGNOSIS — E78.1 HYPERTRIGLYCERIDEMIA: ICD-10-CM

## 2024-06-11 DIAGNOSIS — I10 ESSENTIAL HYPERTENSION: ICD-10-CM

## 2024-06-11 PROCEDURE — 93306 TTE W/DOPPLER COMPLETE: CPT | Performed by: INTERNAL MEDICINE

## 2024-06-11 PROCEDURE — 93306 TTE W/DOPPLER COMPLETE: CPT

## 2024-06-12 ENCOUNTER — DOCUMENTATION (OUTPATIENT)
Dept: PHYSICAL THERAPY | Facility: CLINIC | Age: 59
End: 2024-06-12
Payer: MEDICARE

## 2024-06-12 LAB
AORTIC VALVE MEAN GRADIENT: 2.1 MMHG
AORTIC VALVE PEAK VELOCITY: 1.07 M/S
AV PEAK GRADIENT: 4.6 MMHG
AVA (PEAK VEL): 3.33 CM2
AVA (VTI): 3.37 CM2
EJECTION FRACTION APICAL 4 CHAMBER: 59.9
LEFT VENTRICLE INTERNAL DIMENSION DIASTOLE: 4.49 CM (ref 3.5–6)
LEFT VENTRICULAR OUTFLOW TRACT DIAMETER: 2 CM
LV EJECTION FRACTION BIPLANE: 60 %
MITRAL VALVE E/A RATIO: 1.29
MITRAL VALVE E/E' RATIO: 7.32
RIGHT VENTRICLE PEAK SYSTOLIC PRESSURE: 27.6 MMHG

## 2024-06-13 ENCOUNTER — TELEPHONE (OUTPATIENT)
Dept: CARDIOLOGY | Facility: CLINIC | Age: 59
End: 2024-06-13
Payer: MEDICARE

## 2024-06-13 ENCOUNTER — TELEPHONE (OUTPATIENT)
Dept: ENDOCRINOLOGY | Facility: CLINIC | Age: 59
End: 2024-06-13
Payer: MEDICARE

## 2024-06-13 NOTE — TELEPHONE ENCOUNTER
Orlin Laguerre   1965   [id number]   [phone]     Called patient and left voice message in regards to canceling 6/18/24 appt due to urgent matter that provider has. Patient was advised to call off to reschedule appt.

## 2024-06-13 NOTE — TELEPHONE ENCOUNTER
----- Message from Mika Cavanaugh MD sent at 6/12/2024  4:08 PM EDT -----  Regarding: FW:  Echo results looks with the normal. Continue current medication. Continue same follow-up.  ----- Message -----  From: Interface, [facility] - Cardiology Results In  Sent: 6/12/2024   1:04 PM EDT  To: Mika Cavanaugh MD

## 2024-06-14 ENCOUNTER — APPOINTMENT (OUTPATIENT)
Dept: PRIMARY CARE | Facility: CLINIC | Age: 59
End: 2024-06-14
Payer: MEDICARE

## 2024-06-18 ENCOUNTER — APPOINTMENT (OUTPATIENT)
Dept: ENDOCRINOLOGY | Facility: CLINIC | Age: 59
End: 2024-06-18
Payer: MEDICARE

## 2024-06-25 ENCOUNTER — TREATMENT (OUTPATIENT)
Dept: PHYSICAL THERAPY | Facility: CLINIC | Age: 59
End: 2024-06-25
Payer: MEDICARE

## 2024-06-25 DIAGNOSIS — M47.817 SPONDYLOSIS WITHOUT MYELOPATHY OR RADICULOPATHY, LUMBOSACRAL REGION: ICD-10-CM

## 2024-06-25 PROCEDURE — 97140 MANUAL THERAPY 1/> REGIONS: CPT | Mod: GP,CQ

## 2024-06-25 PROCEDURE — 97110 THERAPEUTIC EXERCISES: CPT | Mod: GP,CQ

## 2024-06-25 ASSESSMENT — PAIN SCALES - GENERAL: PAINLEVEL_OUTOF10: 7

## 2024-06-25 NOTE — PROGRESS NOTES
"Physical Therapy Treatment    Patient Name: Orlin Laguerre  MRN: [id number]  Today's Date: 6/25/2024  Time Calculation  Start Time: 1430  Stop Time: 1515  Time Calculation (min): 45 min  PT Therapeutic Procedures Time Entry  Manual Therapy Time Entry: 13  Therapeutic Exercise Time Entry: 30    Insurance:  Visit number: 6 of 13  Authorization info: 1) EVAL ONLY - [employer]ARE - AUTH THRU [employer] / 80% COVERAGE / OOP $8850 - $399 MET 2) [place] DUAL - 100% CONTRACTED RATE /  Insurance Type: Payor: [employer] MEDICARE / Plan: [employer] GOLD CHOICE / Product Type: *No Product type* /     Current Problem   1. Spondylosis without myelopathy or radiculopathy, lumbosacral region  Follow Up In Physical Therapy          Subjective   Pt reports dizziness that \"comes and goes\" lately.    General   General Comment: Cervical spine pain  Precautions:  Precautions  Precautions Comment: Min fall risk  Pain   0-10 (Numeric) Pain Score: 7  Pain Location: Neck  Pain Orientation: Posterior, Right, Left  Pain Onset: Ongoing  Post Treatment Pain Level 3/10    Objective   Did not use modalities (cervical traction, MH) due to Pt C/O dizziness, conferred with Pt primary PT.  Pt required increased frequency of postural cueing.    Treatments:  Therapeutic Exercise:  Therapeutic Exercise  Therapeutic Exercise Performed: Yes  Therapeutic Exercise Activity 1: UBE BWD/FWD 3 mins each  Therapeutic Exercise Activity 2: Scap retractions 5\" hold 2x10  Therapeutic Exercise Activity 3: Rows BTB 2x10  Therapeutic Exercise Activity 4: Sh ext BTB 2x10  Therapeutic Exercise Activity 5: B ER BTB 2x10  Therapeutic Exercise Activity 6: Utrap stretches 20\"x3 L/R each  Therapeutic Exercise Activity 7: Levator stretches 20\"x3 l/R each    Manual:  Manual Therapy  Manual Therapy Performed: Yes  Manual Therapy Activity 1: STM, MFR B Utraps, levators, R>L  Manual Therapy Activity 2: STM cervical PSPs,  Manual Therapy Activity 3: TPR R Utrap.       Assessment "   Assessment:   PT Assessment  PT Assessment Results: Decreased strength, Decreased range of motion, Decreased mobility, Pain  Rehab Prognosis: Fair  Evaluation/Treatment Tolerance: Patient tolerated treatment well  Assessment Comment: Pt demonstrates reduced postural control with ther ex this visit, decreased pain S/S after manual.    Plan:   OP PT Plan  Treatment/Interventions: Dry needling, Education/ Instruction, Manual therapy, Mechanical traction, Self care/ home management, Therapeutic activities, Therapeutic exercises  PT Plan: Skilled PT  PT Frequency: 1 time per week  Duration: 5 more visits  Rehab Potential: Fair  Plan of Care Agreement: Patient    OP EDUCATION:  Outpatient Education  Individual(s) Educated: Patient  Education Provided: Posture, Body Mechanics  Patient/Caregiver Demonstrated Understanding: yes  Patient Response to Education: Patient/Caregiver Verbalized Understanding of Information, Patient/Caregiver Performed Return Demonstration of Exercises/Activities, Patient/Caregiver Asked Appropriate Questions    Goals:   Active       Mobility       Goal 1       Start:  03/12/24    Expected End:  06/10/24       Pt will improve cervical spine AROM to WNL to improve I/ADLs.         Goal 2       Start:  03/12/24    Expected End:  06/10/24       Pt will improve bilat UE strength to 5/5 to improve I/ADLs.            Pain       Goal 1       Start:  03/12/24    Expected End:  06/10/24       Pt will perform all household activities with <3/10 pain.         Goal 2       Start:  03/12/24    Expected End:  06/10/24       Pt will improve all UE flexibility to WNL and <3/10 pain to improve I/ADLs.

## 2024-06-28 NOTE — PROGRESS NOTES
"HPI   58 yo presents as new pt for metabolic management.  Pt with dm2 (dx in 30's, highest wt 250lbs), htn, dyslipidemia, cvd, fatty liver disease, migraines.  A1c-10.4% today.    Taking Lantus 90 units qhs, novolog 140-160 2 units, 161-180 +2, etc, jardiance 25mg, pioglitazone 30mg,  victoza 1.8mg every day (recalls minimal effects with ozempic in the past he thinks).    Dexcom g7 data 30 days: 28% in range, <1% low, pattern: mid 200's overnight, waking low 200's, after am coffee 300's, 200's at dinner, mid 200's bedtime     Taking crestor 40 mg and rx fish oil for lipids.    Taking atenolol/chlorthalidone 100/25mg and prn nitroglycerin for cvd/htn.    Pmh/psh:  -as above   -knee replacement  -R shoulder surgery  -s/p cholecystectomy    SH:  +tobacco, - rare alcohol, + marijuana     FH:  Mom-irregular heart rhythm, breast cancer  Dad-cvd, dm2        Current Outpatient Medications:     albuterol 90 mcg/actuation inhaler, INHALE 2 PUFFS AS NEEDED EVERY 4 HOURS, Disp: 18 g, Rfl: 11    aspirin 81 mg EC tablet, Take 1 tablet (81 mg) by mouth once daily. For 30 days, Disp: , Rfl:     atenoloL-chlorthalidone (Tenoretic) 100-25 mg tablet, TAKE 1 TABLET EVERY DAY, Disp: 90 tablet, Rfl: 3    BD Ultra-Fine Orig Pen Needle 29 gauge x 1/2\" needle, USE TO INJECT EVERY DAY, Disp: 100 each, Rfl: 3    Dexcom G4 platinum  (Dexcom G6 ) misc, as directed test 5 times per day for 90 days, Disp: , Rfl:     DULoxetine (Cymbalta) 60 mg DR capsule, Take 1 capsule (60 mg) by mouth once daily. For 30 days, Disp: , Rfl:     empagliflozin (Jardiance) 25 mg, Take 1 tablet (25 mg) by mouth once daily., Disp: , Rfl:     gabapentin (Neurontin) 600 mg tablet, Take 1 tablet (600 mg) by mouth once daily. For 90 days, Disp: , Rfl:      mg tablet, TAKE 1 TABLET THREE TIMES DAILY AS NEEDED. TAKE WITH FOOD OR MILK., Disp: 270 tablet, Rfl: 3    insulin glargine (Lantus Solostar U-100 Insulin) 100 unit/mL (3 mL) pen, Inject 90 Units " under the skin once daily at bedtime. For 90 days, Disp: , Rfl:     methocarbamol (Robaxin) 750 mg tablet, Take 1 tablet (750 mg) by mouth every 4 hours if needed. For 45 minutes, Disp: , Rfl:     nitroglycerin (Nitrostat) 0.4 mg SL tablet, Place 1 tablet (0.4 mg) under the tongue every 5 minutes if needed for chest pain. For 90 days, Disp: 90 tablet, Rfl: 3    NovoLOG Flexpen U-100 Insulin 100 unit/mL (3 mL) pen, USE AS DIRECTED BY YOUR PRESCRIBER. COMPLETE DIRECTIONS ARE INCLUDED IN A LETTER WITH YOUR ORIGINAL ORDER, Disp: 30 mL, Rfl: 3    omega-3 acid ethyl esters (Lovaza) 1 gram capsule, Take by mouth., Disp: , Rfl:     omeprazole (PriLOSEC) 40 mg DR capsule, TAKE 1 CAPSULE ONE TIME DAILY, Disp: 90 capsule, Rfl: 1    phenytoin ER (Dilantin) 100 mg capsule, Take 1 capsule (100 mg) by mouth once daily at bedtime., Disp: , Rfl:     pioglitazone (Actos) 30 mg tablet, Take 1 tablet (30 mg) by mouth once daily. For 90 days, Disp: , Rfl:     QUEtiapine (SEROquel) 100 mg tablet, Take 1 tablet (100 mg) by mouth once daily. For 30 days, Disp: , Rfl:     rosuvastatin (Crestor) 40 mg tablet, TAKE 1 TABLET EVERY DAY, Disp: 90 tablet, Rfl: 3    SUMAtriptan (Imitrex) 50 mg tablet, Take 1 tablet (50 mg) by mouth once daily as needed. At least 2 hours between doses for 60 days, Disp: , Rfl:     tamsulosin (Flomax) 0.4 mg 24 hr capsule, Take 1 capsule (0.4 mg) by mouth once daily. For 30 days, Disp: , Rfl:     Victoza 3-Gareth 0.6 mg/0.1 mL (18 mg/3 mL) injection, INJECT 1.8MG UNDER THE SKIN EVERY DAY, Disp: 27 mL, Rfl: 10      Allergies as of 07/05/2024 - Reviewed 07/05/2024   Allergen Reaction Noted    Varenicline Other 08/24/2023    Metformin hcl Other 08/24/2023    Tramadol Other and Nausea/vomiting 08/24/2023         Review of Systems   Cardiology: Lightheadedness-denies.  Chest pain-denies.  Leg edema-denies.  Palpitations-denies.  Respiratory: Cough-denies. Shortness of breath-denies.  Wheezing-denies.  Gastroenterology:  Constipation-denies.  Diarrhea-denies.  Heartburn-denies.  Endocrinology: Cold intolerance-denies.  Heat intolerance-denies.  Sweats-denies.  Neurology: Headache-denies.  Tremor-denies.  Neuropathy in extremities-denies.  Psychology: Low energy-denies.  Irritability-denies.  Sleep disturbances-denies.      /64 (BP Location: Right arm, Patient Position: Sitting, BP Cuff Size: Large adult)   Pulse 86   Wt 98.9 kg (218 lb)   BMI 30.40 kg/m²       Labs:  Lab Results   Component Value Date    WBC 8.9 01/16/2023    NRBC 0 01/16/2023    RBC 5.47 01/16/2023    HGB 8.5 (A) 12/14/2023    HCT 50.6 (H) 01/16/2023     01/16/2023     Lab Results   Component Value Date    CALCIUM 9.6 03/11/2024    AST 15 03/11/2024    ALKPHOS 80 03/11/2024    BILITOT 0.3 03/11/2024    PROT 6.8 03/11/2024    ALBUMIN 4.5 03/11/2024    GLOB 2.7 09/06/2023    AGR 1.7 09/06/2023     03/11/2024    K 4.3 03/11/2024    CL 98 03/11/2024    CO2 22 (L) 03/11/2024    ANIONGAP 15 03/11/2024    BUN 17 03/11/2024    CREATININE 0.80 03/11/2024    UREACREAUR 17.8 09/06/2023    GLUCOSE 186 (H) 03/11/2024    ALT 18 03/11/2024    EGFR >90 03/11/2024     Lab Results   Component Value Date    CHOL 139 09/06/2023    TRIG 486 (H) 09/06/2023    HDL 40 (L) 09/06/2023    LDLCALC  09/06/2023     Unable to report calculated LDL due to increased triglycerides. Direct     Lab Results   Component Value Date    MICROALBCREA 27.1 09/06/2023     Lab Results   Component Value Date    TSH 1.45 01/18/2022     Lab Results   Component Value Date    PLABUPIB04 791 09/06/2023     Lab Results   Component Value Date    HGBA1C 10.4 (A) 07/05/2024         Physical Exam   General Appearance: pleasant, cooperative, no acute distress  HEENT: no chemosis, no proptosis, no lid lag, no lid retraction  Neck: no lymphadenopathy, no thyromegaly, no dominant thyroid nodules  Heart: no murmurs, regular rate and rhythm, S1 and S2  Lungs: no wheezes, no rhonci, no rales  Extremities:  no lower extremity swelling      Assessment/Plan   1. Altered metabolism due to diabetes (Multi)      2. Type 2 diabetes mellitus with obesity (Multi)  -A1c ordered and reviewed  -dexcom g7 data reviewed  -labs/notes reviewed    -reviewed natural hx of dm2/glycemic targets/carb allowances-use [employer] daquan harry  -issue is prandial insulin, use 10-20 units of novolog at meals to keep sugars <180, titrate by 2 units q 3 days    -next visit if watching carbs can give I:C ratio    -cautious use of tzd, low threshold to lower or stop    -could explore ozempic/mounjaro in the future visits    -pt was asking about omnipod, likely wouldn't be covered in type 2, pt needs to be able to carb count with I:C before he would even be ready for this    -pt to call back with type of meter/strip/lancets so he can get test strips/lancets    3. Essential hypertension  -at target on therapy, will follow, of note not on ACE-I    4. Mixed hyperlipidemia   -on statin and rx fish oil, should improve as glycemic control improves, will follow    Follow Up:  gia RoyD 8 weeks     Medical Decision Making  Complexity of problem: Chronic illness of diabetes mellitus uncontrolled, progressing  Data analyzed and reviewed: Reviewed prior notes, blood glucose data, labs including HgbA1c, lipids, serum chemistries.  Ordered tests.   Risk of complications and morbidities: Is definite because of use of insulin and risk of hypoglycemia.  Prescription medications reviewed and modifications made.  Compliance assessed.  Addressed social determinants of health including food insecurity.

## 2024-07-03 ENCOUNTER — TREATMENT (OUTPATIENT)
Dept: PHYSICAL THERAPY | Facility: CLINIC | Age: 59
End: 2024-07-03
Payer: MEDICARE

## 2024-07-03 DIAGNOSIS — M47.817 SPONDYLOSIS WITHOUT MYELOPATHY OR RADICULOPATHY, LUMBOSACRAL REGION: ICD-10-CM

## 2024-07-03 PROCEDURE — 97110 THERAPEUTIC EXERCISES: CPT | Mod: GP,CQ

## 2024-07-03 PROCEDURE — 97140 MANUAL THERAPY 1/> REGIONS: CPT | Mod: GP,CQ

## 2024-07-03 ASSESSMENT — PAIN SCALES - GENERAL: PAINLEVEL_OUTOF10: 7

## 2024-07-03 NOTE — PROGRESS NOTES
"Physical Therapy Treatment    Patient Name: Orlin Laguerre  MRN: [id number]  Today's Date: 7/3/2024  Time Calculation  Start Time: 1510  Stop Time: 1558  Time Calculation (min): 48 min  PT Therapeutic Procedures Time Entry  Manual Therapy Time Entry: 15  Therapeutic Exercise Time Entry: 30    Insurance:  Visit number: 7 of 13  Authorization info: 1) EVAL ONLY - [employer] MEDICARE - AUTH THRU [employer] / 80% COVERAGE / OOP $8850 - $399 MET 2) [employer] DUAL - 100% CONTRACTED RATE /  Insurance Type: Payor: HU[employer] MEDICARE / Plan: HU[employer]NA GOLD CHOICE / Product Type: *No Product type* /     Current Problem   1. Spondylosis without myelopathy or radiculopathy, lumbosacral region  Follow Up In Physical Therapy          Subjective   Pt reports LB as well as neck pain on arrival.  States taking anti inflammatory medication earlier today    General   General Comment: Cervical spine pain  Precautions:  Precautions  Precautions Comment: Min fall risk  Pain   0-10 (Numeric) Pain Score: 7  Pain Location: Back  Pain Orientation: Lower  Pain Radiating Towards: also in neck and R shoulder  Pain Onset: Ongoing  Post Treatment Pain Level 4/10    Objective   B utrap, levator tightness, TRP R levator.    Treatments:  Therapeutic Exercise:  Therapeutic Exercise  Therapeutic Exercise Performed: Yes  Therapeutic Exercise Activity 1: UBE BWD/FWD 3 mins each  Therapeutic Exercise Activity 2: Scap retractions 5\" hold 2x10  Therapeutic Exercise Activity 3: Rows PTB 2x10  Therapeutic Exercise Activity 4: Sh ext PTB 2x10  Therapeutic Exercise Activity 5: B ER BTB 2x10  Therapeutic Exercise Activity 6: Utrap stretches 20\"x3 L/R each  Therapeutic Exercise Activity 7: Levator stretches 20\"x3 l/R each    Manual:  Manual Therapy  Manual Therapy Performed: Yes  Manual Therapy Activity 1: STM, MFR B Utraps, levators, R>L  Manual Therapy Activity 2: STM cervical PSPs,  Manual Therapy Activity 3: TPR R levator.     Assessment   Assessment:   PT Assessment  PT " Assessment Results: Decreased strength, Decreased range of motion, Decreased mobility, Pain  Rehab Prognosis: Fair  Evaluation/Treatment Tolerance: Patient tolerated treatment well  Assessment Comment: Pt demonstrates fair postural awareness during ther ex activites this visit.  Moderates pain reduction after manual.    Plan:   OP PT Plan  Treatment/Interventions: Dry needling, Education/ Instruction, Manual therapy, Mechanical traction, Self care/ home management, Therapeutic activities, Therapeutic exercises  PT Plan: Skilled PT  PT Frequency: 1 time per week  Duration: 5 more visits  Rehab Potential: Fair  Plan of Care Agreement: Patient    OP EDUCATION:  Outpatient Education  Individual(s) Educated: Patient  Education Provided: Body Mechanics, Home Exercise Program  Patient/Caregiver Demonstrated Understanding: yes  Patient Response to Education: Patient/Caregiver Verbalized Understanding of Information, Patient/Caregiver Performed Return Demonstration of Exercises/Activities, Patient/Caregiver Asked Appropriate Questions    Goals:   Active       Mobility       Goal 1       Start:  03/12/24    Expected End:  06/10/24       Pt will improve cervical spine AROM to WNL to improve I/ADLs.         Goal 2       Start:  03/12/24    Expected End:  06/10/24       Pt will improve bilat UE strength to 5/5 to improve I/ADLs.            Pain       Goal 1       Start:  03/12/24    Expected End:  06/10/24       Pt will perform all household activities with <3/10 pain.         Goal 2       Start:  03/12/24    Expected End:  06/10/24       Pt will improve all UE flexibility to WNL and <3/10 pain to improve I/ADLs.

## 2024-07-05 ENCOUNTER — APPOINTMENT (OUTPATIENT)
Dept: ENDOCRINOLOGY | Facility: CLINIC | Age: 59
End: 2024-07-05
Payer: MEDICARE

## 2024-07-05 VITALS
DIASTOLIC BLOOD PRESSURE: 64 MMHG | SYSTOLIC BLOOD PRESSURE: 119 MMHG | BODY MASS INDEX: 30.4 KG/M2 | HEART RATE: 86 BPM | WEIGHT: 218 LBS

## 2024-07-05 DIAGNOSIS — I10 ESSENTIAL HYPERTENSION: ICD-10-CM

## 2024-07-05 DIAGNOSIS — E11.69 TYPE 2 DIABETES MELLITUS WITH OBESITY (MULTI): ICD-10-CM

## 2024-07-05 DIAGNOSIS — E78.2 MIXED HYPERLIPIDEMIA: ICD-10-CM

## 2024-07-05 DIAGNOSIS — E11.9 ALTERED METABOLISM DUE TO DIABETES (MULTI): Primary | ICD-10-CM

## 2024-07-05 DIAGNOSIS — E66.9 TYPE 2 DIABETES MELLITUS WITH OBESITY (MULTI): ICD-10-CM

## 2024-07-05 LAB — POC HEMOGLOBIN A1C: 10.4 % (ref 4.2–6.5)

## 2024-07-05 PROCEDURE — 3052F HG A1C>EQUAL 8.0%<EQUAL 9.0%: CPT | Performed by: INTERNAL MEDICINE

## 2024-07-05 PROCEDURE — 99204 OFFICE O/P NEW MOD 45 MIN: CPT | Performed by: INTERNAL MEDICINE

## 2024-07-05 PROCEDURE — 3074F SYST BP LT 130 MM HG: CPT | Performed by: INTERNAL MEDICINE

## 2024-07-05 PROCEDURE — 83036 HEMOGLOBIN GLYCOSYLATED A1C: CPT | Performed by: INTERNAL MEDICINE

## 2024-07-05 PROCEDURE — 3078F DIAST BP <80 MM HG: CPT | Performed by: INTERNAL MEDICINE

## 2024-07-05 PROCEDURE — 4004F PT TOBACCO SCREEN RCVD TLK: CPT | Performed by: INTERNAL MEDICINE

## 2024-07-05 ASSESSMENT — PAIN SCALES - GENERAL: PAINLEVEL: 6

## 2024-07-05 ASSESSMENT — ENCOUNTER SYMPTOMS: DEPRESSION: 0

## 2024-07-09 ENCOUNTER — APPOINTMENT (OUTPATIENT)
Dept: PHYSICAL THERAPY | Facility: CLINIC | Age: 59
End: 2024-07-09
Payer: MEDICARE

## 2024-07-09 ENCOUNTER — DOCUMENTATION (OUTPATIENT)
Dept: PHYSICAL THERAPY | Facility: CLINIC | Age: 59
End: 2024-07-09
Payer: MEDICARE

## 2024-07-09 ENCOUNTER — HOSPITAL ENCOUNTER (OUTPATIENT)
Dept: RADIOLOGY | Facility: EXTERNAL LOCATION | Age: 59
Discharge: HOME | End: 2024-07-09

## 2024-07-09 DIAGNOSIS — S99.921A RIGHT FOOT INJURY, INITIAL ENCOUNTER: ICD-10-CM

## 2024-07-09 NOTE — PROGRESS NOTES
Physical Therapy                 Therapy Communication Note    Patient Name: Orlin Laguerre  MRN: [id number]  Today's Date: 7/9/2024     Discipline: Physical Therapy    Missed Visit Reason:      Missed Time: Cancel    Comment:

## 2024-07-16 ENCOUNTER — TREATMENT (OUTPATIENT)
Dept: PHYSICAL THERAPY | Facility: CLINIC | Age: 59
End: 2024-07-16
Payer: MEDICARE

## 2024-07-16 DIAGNOSIS — M47.817 SPONDYLOSIS WITHOUT MYELOPATHY OR RADICULOPATHY, LUMBOSACRAL REGION: ICD-10-CM

## 2024-07-16 PROCEDURE — 97140 MANUAL THERAPY 1/> REGIONS: CPT | Mod: GP,CQ

## 2024-07-16 PROCEDURE — 97110 THERAPEUTIC EXERCISES: CPT | Mod: GP,CQ

## 2024-07-16 ASSESSMENT — PAIN SCALES - GENERAL: PAINLEVEL_OUTOF10: 6

## 2024-07-16 NOTE — PROGRESS NOTES
"Physical Therapy Treatment    Patient Name: Orlin Laguerre  MRN: [id number]  Today's Date: 7/16/2024  Time Calculation  Start Time: 1300  Stop Time: 1345  Time Calculation (min): 45 min  PT Therapeutic Procedures Time Entry  Manual Therapy Time Entry: 13  Therapeutic Exercise Time Entry: 32    Insurance:  Visit number: 8 of 13  Authorization info: 1) EVAL ONLY - [employer] MEDICARE - AUTH THRU COH[employer] / 80% COVERAGE / OOP $8850 - $399 MET 2) [employer] DUAL - 100% CONTRACTED RATE /  Insurance Type: Payor: [employer] MEDICARE / Plan: [employer] GOLD CHOICE / Product Type: *No Product type* /     Current Problem   1. Spondylosis without myelopathy or radiculopathy, lumbosacral region  Follow Up In Physical Therapy          Subjective   Pt reports neck post Rankle pain on arrival.    General   Referred By: Memo Larios MD  General Comment: Cervical spine pain  Precautions:  Precautions  Precautions Comment: Min fall risk  Pain   0-10 (Numeric) Pain Score: 6  Pain Location: Neck  Pain Orientation: Posterior  Pain Radiating Towards: Also LB, R foot  Pain Onset: Ongoing  Post Treatment Pain Level 4/10    Objective   Pt requires less frequent postural cueing this visit.    Treatments:  Therapeutic Exercise:  Therapeutic Exercise  Therapeutic Exercise Performed: Yes  Therapeutic Exercise Activity 1: UBE BWD/FWD 3 mins each  Therapeutic Exercise Activity 2: Scap retractions 5\" hold 2x10  Therapeutic Exercise Activity 3: Rows PTB 2x10  Therapeutic Exercise Activity 4: Sh ext PTB 2x10  Therapeutic Exercise Activity 5: ER BTB 2x10  Therapeutic Exercise Activity 6: IR BTB 2x10  Therapeutic Exercise Activity 7: Utrap stretches 20\"x3 L/R each  Therapeutic Exercise Activity 8: Levator stretches 20\"x3 l/R each  Therapeutic Exercise Activity 9: Chin tucks    Manual:  Manual Therapy  Manual Therapy Performed: Yes  Manual Therapy Activity 1: STM, MFR B Utraps, levators, R>L  Manual Therapy Activity 2: STM cervical PSPs,  Manual Therapy Activity " 3: TPR R levator.       Assessment   Assessment:   PT Assessment  PT Assessment Results: Decreased strength, Decreased range of motion, Decreased mobility, Pain  Rehab Prognosis: Fair  Evaluation/Treatment Tolerance: Patient tolerated treatment well  Assessment Comment: Pt tolerates decreaswed reduction in utrap/neck pain after manual, demonstrates improving postural awareness.    Plan:   OP PT Plan  Treatment/Interventions: Dry needling, Education/ Instruction, Manual therapy, Mechanical traction, Self care/ home management, Therapeutic activities, Therapeutic exercises  PT Plan: Skilled PT  PT Frequency: 1 time per week  Duration: 5 more visits  Rehab Potential: Fair  Plan of Care Agreement: Patient    OP EDUCATION:  Outpatient Education  Individual(s) Educated: Patient  Education Provided: Physiology, Body Mechanics  Patient/Caregiver Demonstrated Understanding: yes  Patient Response to Education: Patient/Caregiver Verbalized Understanding of Information, Patient/Caregiver Performed Return Demonstration of Exercises/Activities, Patient/Caregiver Asked Appropriate Questions    Goals:   Active       Mobility       Goal 1       Start:  03/12/24    Expected End:  06/10/24       Pt will improve cervical spine AROM to WNL to improve I/ADLs.         Goal 2       Start:  03/12/24    Expected End:  06/10/24       Pt will improve bilat UE strength to 5/5 to improve I/ADLs.            Pain       Goal 1       Start:  03/12/24    Expected End:  06/10/24       Pt will perform all household activities with <3/10 pain.         Goal 2       Start:  03/12/24    Expected End:  06/10/24       Pt will improve all UE flexibility to WNL and <3/10 pain to improve I/ADLs.

## 2024-07-23 ENCOUNTER — TREATMENT (OUTPATIENT)
Dept: PHYSICAL THERAPY | Facility: CLINIC | Age: 59
End: 2024-07-23
Payer: MEDICARE

## 2024-07-23 DIAGNOSIS — M47.817 SPONDYLOSIS WITHOUT MYELOPATHY OR RADICULOPATHY, LUMBOSACRAL REGION: ICD-10-CM

## 2024-07-23 PROCEDURE — 97110 THERAPEUTIC EXERCISES: CPT | Mod: GP | Performed by: PHYSICAL THERAPIST

## 2024-07-23 NOTE — PROGRESS NOTES
"Physical Therapy Treatment    Patient Name: Orlin Laguerre  MRN: [id number]  Today's Date: 7/23/2024  Time Calculation  Start Time: 1315  Stop Time: 1355  Time Calculation (min): 40 min  PT Therapeutic Procedures Time Entry  Therapeutic Exercise Time Entry: 40    Insurance:  Visit number: 9 of 13  Authorization info: 1) EVAL ONLY - [employer] MEDICARE - AUTH THRU [employer] / 80% COVERAGE / OOP $8850 - $399 MET 2) [facility] [place] [facility] R[place] /  Insurance Type: Payor: HU[employer]NA MEDICARE / Plan: HU[employer]NA GOLD CHOICE / Product Type: *No Product type*     Current Problem   1. Spondylosis without myelopathy or radiculopathy, lumbosacral region  Follow Up In Physical Therapy          Subjective   General   Patient states \"neck is feeling pretty good.\" Notices pain in the morning which gets better as day goes on. Still having trouble turning head to left. Admits to not doing exercises at home.       Precautions: Fall risk   Pain 6/10  Post Treatment Pain Level \"Better\"     Objective   Cervical spine no tenderness to palpation.   Suboccipitals and scalenes with normal muscle tone without atrophy     Treatments:  Therapeutic Exercise Activity 1: UBE BWD/FWD 3 mins each  Therapeutic Exercise Activity 2: Scap retractions 5\" hold 3x10  Therapeutic Exercise Activity 7: UT towel stretch, 30 seconds x 3 R/L  Therapeutic Exercise Activity 3: Rows PTB 3x10  Therapeutic Exercise Activity 4: Sh ext PTB 3x10  Therapeutic Exercise Activity 5: ER PTB 2x10 R  Therapeutic Exercise Activity 6: IR PTB 2x10 R  Therapeutic Exercise Activity 8: Sh punch PTB 2x10 ea R/L   Therapeutic Exercise Activity 6: Touchdowns, 2x10          Assessment   Assessment:   Normal alignment of cervical spine and reduced radicular symptoms into bilateral UE this date. Able to increase reps of UE tband exercises signaling improving strength.     Plan:  Cont with flexibility and strengthening     OP EDUCATION: Importance of performing HEP daily     Goals:   Active       " Mobility       Goal 1       Start:  03/12/24    Expected End:  06/10/24       Pt will improve cervical spine AROM to WNL to improve I/ADLs.         Goal 2       Start:  03/12/24    Expected End:  06/10/24       Pt will improve bilat UE strength to 5/5 to improve I/ADLs.            Pain       Goal 1       Start:  03/12/24    Expected End:  06/10/24       Pt will perform all household activities with <3/10 pain.         Goal 2       Start:  03/12/24    Expected End:  06/10/24       Pt will improve all UE flexibility to WNL and <3/10 pain to improve I/ADLs.

## 2024-08-12 DIAGNOSIS — N40.1 BENIGN LOCALIZED PROSTATIC HYPERPLASIA WITH LOWER URINARY TRACT SYMPTOMS (LUTS): ICD-10-CM

## 2024-08-12 DIAGNOSIS — E11.9 ALTERED METABOLISM DUE TO DIABETES (MULTI): Primary | ICD-10-CM

## 2024-08-12 RX ORDER — TAMSULOSIN HYDROCHLORIDE 0.4 MG/1
0.4 CAPSULE ORAL DAILY
Qty: 90 CAPSULE | Refills: 3 | Status: SHIPPED | OUTPATIENT
Start: 2024-08-12

## 2024-08-12 RX ORDER — ICOSAPENT ETHYL 1000 MG/1
CAPSULE ORAL
Qty: 360 CAPSULE | Refills: 3 | Status: SHIPPED | OUTPATIENT
Start: 2024-08-12

## 2024-08-15 DIAGNOSIS — E11.9 ALTERED METABOLISM DUE TO DIABETES (MULTI): Primary | ICD-10-CM

## 2024-08-18 RX ORDER — EMPAGLIFLOZIN 25 MG/1
25 TABLET, FILM COATED ORAL DAILY
Qty: 90 TABLET | Refills: 3 | Status: SHIPPED | OUTPATIENT
Start: 2024-08-18

## 2024-08-19 ENCOUNTER — APPOINTMENT (OUTPATIENT)
Dept: ORTHOPEDIC SURGERY | Facility: CLINIC | Age: 59
End: 2024-08-19
Payer: MEDICARE

## 2024-08-19 DIAGNOSIS — M75.121 COMPLETE TEAR OF RIGHT ROTATOR CUFF, UNSPECIFIED WHETHER TRAUMATIC: Primary | ICD-10-CM

## 2024-08-19 PROCEDURE — 3052F HG A1C>EQUAL 8.0%<EQUAL 9.0%: CPT | Performed by: ORTHOPAEDIC SURGERY

## 2024-08-19 PROCEDURE — 99213 OFFICE O/P EST LOW 20 MIN: CPT | Performed by: ORTHOPAEDIC SURGERY

## 2024-08-19 PROCEDURE — 4004F PT TOBACCO SCREEN RCVD TLK: CPT | Performed by: ORTHOPAEDIC SURGERY

## 2024-08-19 ASSESSMENT — ENCOUNTER SYMPTOMS
ARTHRALGIAS: 1
SHORTNESS OF BREATH: 0
CHILLS: 0
FATIGUE: 0
TROUBLE SWALLOWING: 0
COLOR CHANGE: 0
FEVER: 0
SINUS PRESSURE: 0
WHEEZING: 0

## 2024-08-19 ASSESSMENT — PAIN SCALES - GENERAL: PAINLEVEL_OUTOF10: 6

## 2024-08-19 ASSESSMENT — PAIN - FUNCTIONAL ASSESSMENT: PAIN_FUNCTIONAL_ASSESSMENT: 0-10
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
## 2024-08-23 ENCOUNTER — TELEPHONE (OUTPATIENT)
Dept: PRIMARY CARE | Facility: CLINIC | Age: 59
End: 2024-08-23
Payer: MEDICARE

## 2024-09-04 ENCOUNTER — APPOINTMENT (OUTPATIENT)
Dept: ENDOCRINOLOGY | Facility: CLINIC | Age: 59
End: 2024-09-04
Payer: MEDICARE

## 2024-09-04 VITALS
DIASTOLIC BLOOD PRESSURE: 58 MMHG | HEART RATE: 61 BPM | BODY MASS INDEX: 30.32 KG/M2 | WEIGHT: 217.4 LBS | SYSTOLIC BLOOD PRESSURE: 106 MMHG

## 2024-09-04 DIAGNOSIS — I10 ESSENTIAL HYPERTENSION: ICD-10-CM

## 2024-09-04 DIAGNOSIS — E78.2 MIXED HYPERLIPIDEMIA: ICD-10-CM

## 2024-09-04 DIAGNOSIS — J45.20 MILD INTERMITTENT ASTHMA WITHOUT COMPLICATION (HHS-HCC): ICD-10-CM

## 2024-09-04 DIAGNOSIS — E11.65 TYPE 2 DIABETES MELLITUS WITH HYPERGLYCEMIA, WITH LONG-TERM CURRENT USE OF INSULIN (MULTI): Primary | ICD-10-CM

## 2024-09-04 DIAGNOSIS — Z79.4 TYPE 2 DIABETES MELLITUS WITH HYPERGLYCEMIA, WITH LONG-TERM CURRENT USE OF INSULIN (MULTI): Primary | ICD-10-CM

## 2024-09-04 LAB — POC HEMOGLOBIN A1C: 8.9 % (ref 4.2–6.5)

## 2024-09-04 PROCEDURE — 83036 HEMOGLOBIN GLYCOSYLATED A1C: CPT | Performed by: INTERNAL MEDICINE

## 2024-09-04 PROCEDURE — 99214 OFFICE O/P EST MOD 30 MIN: CPT | Performed by: INTERNAL MEDICINE

## 2024-09-04 PROCEDURE — 95251 CONT GLUC MNTR ANALYSIS I&R: CPT | Performed by: INTERNAL MEDICINE

## 2024-09-04 RX ORDER — TIRZEPATIDE 5 MG/.5ML
5 INJECTION, SOLUTION SUBCUTANEOUS
Qty: 6 ML | Refills: 1 | Status: SHIPPED | OUTPATIENT
Start: 2024-09-04

## 2024-09-04 NOTE — PROGRESS NOTES
"HPI     60 yo presented as new pt for metabolic management last visit July 2024, here for follow up.  Pt with dm2 (dx in 30's, highest wt 250lbs), htn, dyslipidemia, cvd, fatty liver disease, migraines.  A1c-10.4% last visit,  8.9% today.    Taking Lantus 80 units qhs, novolog 10-20 units at meals -really dosing more randomly after meals or if/when high,  jardiance 25mg, OFF pioglitazone 30mg -pt stopped after last visit d/t concerns w/ his ?chf,  victoza 1.8mg every day (recalls minimal effects with ozempic in the past he thinks).    Dexcom g7 data 30 days:  53% in range, <1% low, pattern: upper 100's to low 200's overnight, waking mid to high 100's, after am coffee mid 200's, upper 100's at dinner, low 200's bedtime     Taking crestor 40 mg and rx fish oil for lipids.    Taking atenolol/chlorthalidone 100/25mg and prn nitroglycerin for cvd/htn.        Current Outpatient Medications:     albuterol 90 mcg/actuation inhaler, INHALE 2 PUFFS AS NEEDED EVERY 4 HOURS, Disp: 18 g, Rfl: 11    aspirin 81 mg EC tablet, Take 1 tablet (81 mg) by mouth once daily. For 30 days, Disp: , Rfl:     atenoloL-chlorthalidone (Tenoretic) 100-25 mg tablet, TAKE 1 TABLET EVERY DAY, Disp: 90 tablet, Rfl: 3    BD Ultra-Fine Orig Pen Needle 29 gauge x 1/2\" needle, USE TO INJECT EVERY DAY, Disp: 100 each, Rfl: 3    DULoxetine (Cymbalta) 60 mg DR capsule, Take 1 capsule (60 mg) by mouth once daily. For 30 days, Disp: , Rfl:     gabapentin (Neurontin) 600 mg tablet, Take 1 tablet (600 mg) by mouth once daily. For 90 days, Disp: , Rfl:      mg tablet, TAKE 1 TABLET THREE TIMES DAILY AS NEEDED. TAKE WITH FOOD OR MILK., Disp: 270 tablet, Rfl: 3    insulin glargine (Lantus Solostar U-100 Insulin) 100 unit/mL (3 mL) pen, Inject 90 Units under the skin once daily at bedtime. For 90 days, Disp: , Rfl:     Jardiance 25 mg, TAKE 1 TABLET EVERY DAY, Disp: 90 tablet, Rfl: 3    methocarbamol (Robaxin) 750 mg tablet, Take 1 tablet (750 mg) by mouth " every 4 hours if needed. For 45 minutes, Disp: , Rfl:     nitroglycerin (Nitrostat) 0.4 mg SL tablet, Place 1 tablet (0.4 mg) under the tongue every 5 minutes if needed for chest pain. For 90 days, Disp: 90 tablet, Rfl: 3    NovoLOG Flexpen U-100 Insulin 100 unit/mL (3 mL) pen, USE AS DIRECTED BY YOUR PRESCRIBER. COMPLETE DIRECTIONS ARE INCLUDED IN A LETTER WITH YOUR ORIGINAL ORDER, Disp: 30 mL, Rfl: 3    omeprazole (PriLOSEC) 40 mg DR capsule, TAKE 1 CAPSULE ONE TIME DAILY, Disp: 90 capsule, Rfl: 1    phenytoin ER (Dilantin) 100 mg capsule, Take 1 capsule (100 mg) by mouth once daily at bedtime., Disp: , Rfl:     pioglitazone (Actos) 30 mg tablet, Take 1 tablet (30 mg) by mouth once daily. For 90 days, Disp: , Rfl:     QUEtiapine (SEROquel) 100 mg tablet, Take 1 tablet (100 mg) by mouth once daily. For 30 days, Disp: , Rfl:     rosuvastatin (Crestor) 40 mg tablet, TAKE 1 TABLET EVERY DAY, Disp: 90 tablet, Rfl: 3    SUMAtriptan (Imitrex) 50 mg tablet, Take 1 tablet (50 mg) by mouth once daily as needed. At least 2 hours between doses for 60 days, Disp: , Rfl:     tamsulosin (Flomax) 0.4 mg 24 hr capsule, TAKE 1 CAPSULE EVERY DAY, Disp: 90 capsule, Rfl: 3    Vascepa 1 gram capsule, TAKE 2 CAPSULES TWICE DAILY WITH MEALS, Disp: 360 capsule, Rfl: 3    Victoza 3-Gareth 0.6 mg/0.1 mL (18 mg/3 mL) injection, INJECT 1.8MG UNDER THE SKIN EVERY DAY, Disp: 27 mL, Rfl: 10    Allergies as of 09/04/2024 - Reviewed 09/04/2024   Allergen Reaction Noted    Varenicline Other 08/24/2023    Metformin hcl Other 08/24/2023    Tramadol Other and Nausea/vomiting 08/24/2023       /58   Pulse 61   Wt 98.6 kg (217 lb 6.4 oz)   BMI 30.32 kg/m²     Labs:   Lab Results   Component Value Date    WBC 8.9 01/16/2023    NRBC 0 01/16/2023    RBC 5.47 01/16/2023    HGB 8.5 (A) 12/14/2023    HCT 50.6 (H) 01/16/2023     01/16/2023     Lab Results   Component Value Date    CALCIUM 9.6 03/11/2024    AST 15 03/11/2024    ALKPHOS 80 03/11/2024     BILITOT 0.3 03/11/2024    PROT 6.8 03/11/2024    ALBUMIN 4.5 03/11/2024    GLOB 2.7 09/06/2023    AGR 1.7 09/06/2023     03/11/2024    K 4.3 03/11/2024    CL 98 03/11/2024    CO2 22 (L) 03/11/2024    ANIONGAP 15 03/11/2024    BUN 17 03/11/2024    CREATININE 0.80 03/11/2024    UREACREAUR 17.8 09/06/2023    GLUCOSE 186 (H) 03/11/2024    ALT 18 03/11/2024    EGFR >90 03/11/2024     Lab Results   Component Value Date    CHOL 139 09/06/2023    TRIG 486 (H) 09/06/2023    HDL 40 (L) 09/06/2023    LDLCALC  09/06/2023     Unable to report calculated LDL due to increased triglycerides. Direct     Lab Results   Component Value Date    MICROALBCREA 27.1 09/06/2023     Lab Results   Component Value Date    TSH 1.45 01/18/2022     Lab Results   Component Value Date    PTXABTZH51 791 09/06/2023     Lab Results   Component Value Date    HGBA1C 8.9 (A) 09/04/2024         Assessment/Plan   1. Type 2 diabetes mellitus with hyperglycemia, with long-term current use of insulin (Multi)    -A1c ordered & reviewed   -labs reviewed & ordered to repeat for next visit   -dexcom clarity data reviewed & scanned into epic     -glycemic control improved since last visit,  remains above target   -variability in sugars coming from inconsistency w/ dosing prandial insulin, not able to count carbs     -will benefit from more powerful glp1, potentially be able to come off prandial insulin in future  -add mounjaro in place of victoza, 2.5mg weekly x4 wks (sample [id number] exp 9/22/25), then 5mg weekly thereafter     -use novolog 10 units BEFORE meals, keep lantus at 80 units for now       2. Essential hypertension  -at target on therapy     3. Mixed hyperlipidemia  -on statin, tolerating, update fasting labs prior to next visit       Follow up: 3mon jael     -labs/tests/notes reviewed  -reviewed and counseled patient on medication monitoring and side effects    Treatment and plan discussed with Dr. Sexton.  FILIBERTO Carter, PharmD, BC-ADM, [facility].      Medical Decision Making  Complexity of problem: Chronic illness of diabetes mellitus uncontrolled, progressing  Data analyzed and reviewed: Reviewed prior notes, blood glucose data, labs including HgbA1c, lipids, serum chemistries.  Ordered tests.   Risk of complications and morbidities: Is definite because of use of insulin and risk of hypoglycemia.  Prescription medications reviewed and modifications made.  Compliance assessed.  Addressed social determinants of health including food insecurity.

## 2024-09-04 NOTE — PATIENT INSTRUCTIONS
Begin Mounjaro 2.5mg once a week for first 4 weeks (sample box).  Then,  increase to 5mg once weekly thereafter (coming from mailorder pharmacy)    Stop Victoza     Continue Lantus 80 units daily     Use Novolog 10 units before meals    Continue Jardiance 25mg once daily    Stay off Pioglitazone     Go for fasting blood work before next visit   
no one

## 2024-09-05 NOTE — PROGRESS NOTES
Attestation signed by Jorge Sexton MD on 9/5/24 at 7:57 AM.    I, Dr Jorge Sexton, have reviewed this progress note, medication list, vital signs, any pertinent lab values, and any CGM data if present with the [facility] and  face to face during this visit today. This note reflects the treatment plan that was made under my direction after reviewing the above mentioned elements while face to face with the patient and CDE.  I personally answered and addressed any questions and concerns the patient had during the visit today.  The CDE entered the data in this note under my direction and I personally reviewed it, signed any lab or medication orders that I instructed to be completed. I am the billing provider for this visit and the level of service was determined by my involvement in the Medical Decision Making Component of this visit while face to face with the patient.

## 2024-09-08 RX ORDER — ALBUTEROL SULFATE 90 UG/1
2 INHALANT RESPIRATORY (INHALATION) EVERY 4 HOURS PRN
Qty: 18 G | Refills: 3 | Status: SHIPPED | OUTPATIENT
Start: 2024-09-08

## 2024-09-08 RX ORDER — GABAPENTIN 600 MG/1
600 TABLET ORAL 3 TIMES DAILY
Qty: 270 TABLET | Refills: 3 | Status: SHIPPED | OUTPATIENT
Start: 2024-09-08

## 2024-09-25 ENCOUNTER — DOCUMENTATION (OUTPATIENT)
Dept: PHYSICAL THERAPY | Facility: CLINIC | Age: 59
End: 2024-09-25
Payer: MEDICARE

## 2024-09-25 ENCOUNTER — OFFICE VISIT (OUTPATIENT)
Dept: PRIMARY CARE | Facility: CLINIC | Age: 59
End: 2024-09-25
Payer: MEDICARE

## 2024-09-25 VITALS
OXYGEN SATURATION: 97 % | WEIGHT: 216 LBS | SYSTOLIC BLOOD PRESSURE: 126 MMHG | BODY MASS INDEX: 30.24 KG/M2 | DIASTOLIC BLOOD PRESSURE: 70 MMHG | HEART RATE: 84 BPM | HEIGHT: 71 IN

## 2024-09-25 DIAGNOSIS — E11.9 ALTERED METABOLISM DUE TO DIABETES (MULTI): ICD-10-CM

## 2024-09-25 DIAGNOSIS — Z00.00 ROUTINE GENERAL MEDICAL EXAMINATION AT HEALTH CARE FACILITY: Primary | ICD-10-CM

## 2024-09-25 DIAGNOSIS — Z12.5 SCREENING PSA (PROSTATE SPECIFIC ANTIGEN): ICD-10-CM

## 2024-09-25 LAB
POC ALBUMIN /CREATININE RATIO MANUALLY ENTERED: <30 UG/MG CREAT
POC APPEARANCE, URINE: CLEAR
POC BILIRUBIN, URINE: NEGATIVE
POC BLOOD, URINE: NEGATIVE
POC COLOR, URINE: YELLOW
POC GLUCOSE, URINE: ABNORMAL MG/DL
POC KETONES, URINE: NEGATIVE MG/DL
POC LEUKOCYTES, URINE: NEGATIVE
POC NITRITE,URINE: NEGATIVE
POC PH, URINE: 6.5 PH
POC PROTEIN, URINE: NEGATIVE MG/DL
POC SPECIFIC GRAVITY, URINE: 1.01
POC URINE ALBUMIN: 10 MG/L
POC URINE CREATININE: 10 MG/DL
POC UROBILINOGEN, URINE: 0.2 EU/DL

## 2024-09-25 PROCEDURE — 3074F SYST BP LT 130 MM HG: CPT | Performed by: FAMILY MEDICINE

## 2024-09-25 PROCEDURE — 3052F HG A1C>EQUAL 8.0%<EQUAL 9.0%: CPT | Performed by: FAMILY MEDICINE

## 2024-09-25 PROCEDURE — 4004F PT TOBACCO SCREEN RCVD TLK: CPT | Performed by: FAMILY MEDICINE

## 2024-09-25 PROCEDURE — 3078F DIAST BP <80 MM HG: CPT | Performed by: FAMILY MEDICINE

## 2024-09-25 PROCEDURE — 81003 URINALYSIS AUTO W/O SCOPE: CPT | Mod: QW | Performed by: FAMILY MEDICINE

## 2024-09-25 PROCEDURE — 3008F BODY MASS INDEX DOCD: CPT | Performed by: FAMILY MEDICINE

## 2024-09-25 PROCEDURE — 99215 OFFICE O/P EST HI 40 MIN: CPT | Performed by: FAMILY MEDICINE

## 2024-09-25 PROCEDURE — G0439 PPPS, SUBSEQ VISIT: HCPCS | Performed by: FAMILY MEDICINE

## 2024-09-25 ASSESSMENT — ENCOUNTER SYMPTOMS
CARDIOVASCULAR NEGATIVE: 1
BACK PAIN: 1
ARTHRALGIAS: 1
RESPIRATORY NEGATIVE: 1
HEMATOLOGIC/LYMPHATIC NEGATIVE: 1
CONSTITUTIONAL NEGATIVE: 1
GASTROINTESTINAL NEGATIVE: 1
PSYCHIATRIC NEGATIVE: 1
POLYDIPSIA: 0

## 2024-09-25 ASSESSMENT — PAIN SCALES - GENERAL: PAINLEVEL: 6
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
## 2024-09-25 NOTE — PROGRESS NOTES
"Physical Therapy    Discharge Summary    Name: Orlin Laguerre \"Michael\"  MRN: [id number]  : 1965  Date: 24    Discharge Summary: PT    Discharge Information: Date of evaluation 2024 and Number of attended visits 5    Therapy Summary: Pt with slow but good progress during therapy and has met his goals. He is able to perform all of his activities without pain. He exhibits improved posture.     Discharge Status: Cont with HEP on own.     Rehab Discharge Reason: Achieved all and/or the most significant goals(s)  "

## 2024-10-02 DIAGNOSIS — M54.16 LUMBAR RADICULOPATHY: Primary | ICD-10-CM

## 2024-10-02 RX ORDER — METHOCARBAMOL 750 MG/1
750 TABLET, FILM COATED ORAL EVERY 4 HOURS PRN
Qty: 270 TABLET | Refills: 3 | Status: SHIPPED | OUTPATIENT
Start: 2024-10-02

## 2024-10-11 ENCOUNTER — TELEPHONE (OUTPATIENT)
Dept: ENDOCRINOLOGY | Facility: CLINIC | Age: 59
End: 2024-10-11
Payer: MEDICARE

## 2024-10-11 DIAGNOSIS — E11.9 ALTERED METABOLISM DUE TO DIABETES (MULTI): Primary | ICD-10-CM

## 2024-10-11 RX ORDER — INSULIN GLARGINE 100 [IU]/ML
90 INJECTION, SOLUTION SUBCUTANEOUS NIGHTLY
Qty: 3 ML | Refills: 3 | Status: SHIPPED | OUTPATIENT
Start: 2024-10-11

## 2024-10-11 RX ORDER — PEN NEEDLE, DIABETIC 29 G X1/2"
NEEDLE, DISPOSABLE MISCELLANEOUS
Qty: 100 EACH | Refills: 3 | Status: SHIPPED | OUTPATIENT
Start: 2024-10-11

## 2024-10-20 ENCOUNTER — OFFICE VISIT (OUTPATIENT)
Dept: URGENT CARE | Age: 59
End: 2024-10-20
Payer: MEDICARE

## 2024-10-20 VITALS
SYSTOLIC BLOOD PRESSURE: 117 MMHG | TEMPERATURE: 97.5 F | BODY MASS INDEX: 28.73 KG/M2 | WEIGHT: 206 LBS | HEART RATE: 84 BPM | RESPIRATION RATE: 18 BRPM | DIASTOLIC BLOOD PRESSURE: 68 MMHG | OXYGEN SATURATION: 98 %

## 2024-10-20 DIAGNOSIS — S61.211A LACERATION OF LEFT INDEX FINGER WITHOUT DAMAGE TO NAIL, FOREIGN BODY PRESENCE UNSPECIFIED, INITIAL ENCOUNTER: Primary | ICD-10-CM

## 2024-10-20 PROCEDURE — 3052F HG A1C>EQUAL 8.0%<EQUAL 9.0%: CPT | Performed by: NURSE PRACTITIONER

## 2024-10-20 PROCEDURE — 90715 TDAP VACCINE 7 YRS/> IM: CPT | Performed by: NURSE PRACTITIONER

## 2024-10-20 PROCEDURE — 90471 IMMUNIZATION ADMIN: CPT | Performed by: NURSE PRACTITIONER

## 2024-10-20 PROCEDURE — 3078F DIAST BP <80 MM HG: CPT | Performed by: NURSE PRACTITIONER

## 2024-10-20 PROCEDURE — 4004F PT TOBACCO SCREEN RCVD TLK: CPT | Performed by: NURSE PRACTITIONER

## 2024-10-20 PROCEDURE — 99213 OFFICE O/P EST LOW 20 MIN: CPT | Performed by: NURSE PRACTITIONER

## 2024-10-20 PROCEDURE — 3074F SYST BP LT 130 MM HG: CPT | Performed by: NURSE PRACTITIONER

## 2024-10-20 ASSESSMENT — ENCOUNTER SYMPTOMS: WOUND: 1
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
## 2024-10-24 DIAGNOSIS — K21.9 GASTROESOPHAGEAL REFLUX DISEASE, UNSPECIFIED WHETHER ESOPHAGITIS PRESENT: ICD-10-CM

## 2024-10-24 DIAGNOSIS — F43.10 PTSD (POST-TRAUMATIC STRESS DISORDER): Primary | ICD-10-CM

## 2024-10-24 RX ORDER — PHENYTOIN SODIUM 100 MG/1
100 CAPSULE, EXTENDED RELEASE ORAL DAILY
Qty: 90 CAPSULE | Refills: 3 | Status: SHIPPED | OUTPATIENT
Start: 2024-10-24

## 2024-10-24 RX ORDER — OMEPRAZOLE 40 MG/1
40 CAPSULE, DELAYED RELEASE ORAL DAILY
Qty: 90 CAPSULE | Refills: 3 | Status: SHIPPED | OUTPATIENT
Start: 2024-10-24

## 2024-11-20 ENCOUNTER — APPOINTMENT (OUTPATIENT)
Dept: CARDIOLOGY | Facility: CLINIC | Age: 59
End: 2024-11-20
Payer: MEDICARE

## 2024-12-02 ENCOUNTER — APPOINTMENT (OUTPATIENT)
Dept: PRIMARY CARE | Facility: CLINIC | Age: 59
End: 2024-12-02
Payer: MEDICARE

## 2024-12-05 ENCOUNTER — OFFICE VISIT (OUTPATIENT)
Dept: CARDIOLOGY | Facility: CLINIC | Age: 59
End: 2024-12-05
Payer: MEDICARE

## 2024-12-05 VITALS
RESPIRATION RATE: 16 BRPM | TEMPERATURE: 98.6 F | HEIGHT: 71 IN | SYSTOLIC BLOOD PRESSURE: 111 MMHG | WEIGHT: 208 LBS | BODY MASS INDEX: 29.12 KG/M2 | OXYGEN SATURATION: 100 % | DIASTOLIC BLOOD PRESSURE: 74 MMHG | HEART RATE: 80 BPM

## 2024-12-05 DIAGNOSIS — I73.9 PERIPHERAL VASCULAR DISEASE (CMS-HCC): Primary | ICD-10-CM

## 2024-12-05 DIAGNOSIS — I10 ESSENTIAL HYPERTENSION: ICD-10-CM

## 2024-12-05 DIAGNOSIS — R00.2 PALPITATIONS: ICD-10-CM

## 2024-12-05 DIAGNOSIS — E78.2 MIXED HYPERLIPIDEMIA: ICD-10-CM

## 2024-12-05 DIAGNOSIS — F17.200 TOBACCO USE DISORDER: ICD-10-CM

## 2024-12-05 PROCEDURE — 3078F DIAST BP <80 MM HG: CPT | Performed by: INTERNAL MEDICINE

## 2024-12-05 PROCEDURE — 99407 BEHAV CHNG SMOKING > 10 MIN: CPT | Performed by: INTERNAL MEDICINE

## 2024-12-05 PROCEDURE — 3052F HG A1C>EQUAL 8.0%<EQUAL 9.0%: CPT | Performed by: INTERNAL MEDICINE

## 2024-12-05 PROCEDURE — 3008F BODY MASS INDEX DOCD: CPT | Performed by: INTERNAL MEDICINE

## 2024-12-05 PROCEDURE — 99214 OFFICE O/P EST MOD 30 MIN: CPT | Performed by: INTERNAL MEDICINE

## 2024-12-05 PROCEDURE — 3074F SYST BP LT 130 MM HG: CPT | Performed by: INTERNAL MEDICINE

## 2024-12-05 PROCEDURE — 4004F PT TOBACCO SCREEN RCVD TLK: CPT | Performed by: INTERNAL MEDICINE

## 2024-12-05 PROCEDURE — 4010F ACE/ARB THERAPY RXD/TAKEN: CPT | Performed by: INTERNAL MEDICINE

## 2024-12-05 RX ORDER — LOSARTAN POTASSIUM 25 MG/1
25 TABLET ORAL DAILY
Qty: 90 TABLET | Refills: 3 | Status: SHIPPED | OUTPATIENT
Start: 2024-12-05 | End: 2025-12-05

## 2024-12-05 RX ORDER — ATENOLOL AND CHLORTHALIDONE TABLET 100; 25 MG/1; MG/1
1 TABLET ORAL DAILY
Qty: 90 TABLET | Refills: 3 | Status: SHIPPED | OUTPATIENT
Start: 2024-12-05

## 2024-12-05 ASSESSMENT — ENCOUNTER SYMPTOMS
DEPRESSION: 0
OCCASIONAL FEELINGS OF UNSTEADINESS: 0
LOSS OF SENSATION IN FEET: 0

## 2024-12-05 ASSESSMENT — LIFESTYLE VARIABLES
HAVE YOU OR SOMEONE ELSE BEEN INJURED AS A RESULT OF YOUR DRINKING: NO
HOW OFTEN DO YOU HAVE A DRINK CONTAINING ALCOHOL: MONTHLY OR LESS
HAS A RELATIVE, FRIEND, DOCTOR, OR ANOTHER HEALTH PROFESSIONAL EXPRESSED CONCERN ABOUT YOUR DRINKING OR SUGGESTED YOU CUT DOWN: NO
HOW MANY STANDARD DRINKS CONTAINING ALCOHOL DO YOU HAVE ON A TYPICAL DAY: 1 OR 2
AUDIT-C TOTAL SCORE: 1
SKIP TO QUESTIONS 9-10: 1
HOW OFTEN DO YOU HAVE SIX OR MORE DRINKS ON ONE OCCASION: NEVER
AUDIT TOTAL SCORE: 1

## 2024-12-05 ASSESSMENT — PATIENT HEALTH QUESTIONNAIRE - PHQ9
SUM OF ALL RESPONSES TO PHQ9 QUESTIONS 1 AND 2: 0
1. LITTLE INTEREST OR PLEASURE IN DOING THINGS: NOT AT ALL
2. FEELING DOWN, DEPRESSED OR HOPELESS: NOT AT ALL

## 2024-12-05 ASSESSMENT — PAIN SCALES - GENERAL: PAINLEVEL_OUTOF10: 0-NO PAIN
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
## 2024-12-09 DIAGNOSIS — E11.9 ALTERED METABOLISM DUE TO DIABETES (MULTI): ICD-10-CM

## 2024-12-09 RX ORDER — INSULIN GLARGINE 100 [IU]/ML
INJECTION, SOLUTION SUBCUTANEOUS
Qty: 60 ML | Refills: 1 | Status: SHIPPED | OUTPATIENT
Start: 2024-12-09

## 2024-12-13 ENCOUNTER — APPOINTMENT (OUTPATIENT)
Dept: ENDOCRINOLOGY | Facility: CLINIC | Age: 59
End: 2024-12-13
Payer: MEDICARE

## 2025-01-03 ENCOUNTER — LAB (OUTPATIENT)
Dept: LAB | Facility: LAB | Age: 60
End: 2025-01-03
Payer: MEDICARE

## 2025-01-03 DIAGNOSIS — E11.65 TYPE 2 DIABETES MELLITUS WITH HYPERGLYCEMIA, WITH LONG-TERM CURRENT USE OF INSULIN: ICD-10-CM

## 2025-01-03 DIAGNOSIS — Z79.4 TYPE 2 DIABETES MELLITUS WITH HYPERGLYCEMIA, WITH LONG-TERM CURRENT USE OF INSULIN: ICD-10-CM

## 2025-01-03 DIAGNOSIS — E78.2 MIXED HYPERLIPIDEMIA: ICD-10-CM

## 2025-01-03 DIAGNOSIS — Z12.5 SCREENING PSA (PROSTATE SPECIFIC ANTIGEN): ICD-10-CM

## 2025-01-03 LAB
ALBUMIN SERPL BCP-MCNC: 4.2 G/DL (ref 3.4–5)
ALP SERPL-CCNC: 56 U/L (ref 33–120)
ALT SERPL W P-5'-P-CCNC: 15 U/L (ref 10–52)
ANION GAP SERPL CALCULATED.3IONS-SCNC: 12 MMOL/L (ref 10–20)
AST SERPL W P-5'-P-CCNC: 15 U/L (ref 9–39)
BASOPHILS # BLD AUTO: 0.06 X10*3/UL (ref 0–0.1)
BASOPHILS NFR BLD AUTO: 0.8 %
BILIRUB SERPL-MCNC: 0.4 MG/DL (ref 0–1.2)
BUN SERPL-MCNC: 11 MG/DL (ref 6–23)
CALCIUM SERPL-MCNC: 9.2 MG/DL (ref 8.6–10.3)
CHLORIDE SERPL-SCNC: 101 MMOL/L (ref 98–107)
CHOLEST SERPL-MCNC: 113 MG/DL (ref 0–199)
CHOLEST/HDLC SERPL: 3.1 {RATIO}
CO2 SERPL-SCNC: 29 MMOL/L (ref 21–32)
CREAT SERPL-MCNC: 0.79 MG/DL (ref 0.5–1.3)
CREAT UR-MCNC: 50.3 MG/DL (ref 20–370)
EGFRCR SERPLBLD CKD-EPI 2021: >90 ML/MIN/1.73M*2
EOSINOPHIL # BLD AUTO: 0.6 X10*3/UL (ref 0–0.7)
EOSINOPHIL NFR BLD AUTO: 7.9 %
ERYTHROCYTE [DISTWIDTH] IN BLOOD BY AUTOMATED COUNT: 13.1 % (ref 11.5–14.5)
GLUCOSE SERPL-MCNC: 181 MG/DL (ref 74–99)
HCT VFR BLD AUTO: 49.7 % (ref 41–52)
HDLC SERPL-MCNC: 36.8 MG/DL
HGB BLD-MCNC: 16.9 G/DL (ref 13.5–17.5)
IMM GRANULOCYTES # BLD AUTO: 0.04 X10*3/UL (ref 0–0.7)
IMM GRANULOCYTES NFR BLD AUTO: 0.5 % (ref 0–0.9)
LDLC SERPL CALC-MCNC: 23 MG/DL
LYMPHOCYTES # BLD AUTO: 1.67 X10*3/UL (ref 1.2–4.8)
LYMPHOCYTES NFR BLD AUTO: 21.9 %
MCH RBC QN AUTO: 30.4 PG (ref 26–34)
MCHC RBC AUTO-ENTMCNC: 34 G/DL (ref 32–36)
MCV RBC AUTO: 89 FL (ref 80–100)
MICROALBUMIN UR-MCNC: <7 MG/L
MICROALBUMIN/CREAT UR: NORMAL MG/G{CREAT}
MONOCYTES # BLD AUTO: 0.48 X10*3/UL (ref 0.1–1)
MONOCYTES NFR BLD AUTO: 6.3 %
NEUTROPHILS # BLD AUTO: 4.76 X10*3/UL (ref 1.2–7.7)
NEUTROPHILS NFR BLD AUTO: 62.6 %
NON HDL CHOLESTEROL: 76 MG/DL (ref 0–149)
NRBC BLD-RTO: 0 /100 WBCS (ref 0–0)
PLATELET # BLD AUTO: 211 X10*3/UL (ref 150–450)
POTASSIUM SERPL-SCNC: 4.1 MMOL/L (ref 3.5–5.3)
PROT SERPL-MCNC: 6.3 G/DL (ref 6.4–8.2)
PSA SERPL-MCNC: 1.29 NG/ML
RBC # BLD AUTO: 5.56 X10*6/UL (ref 4.5–5.9)
SODIUM SERPL-SCNC: 138 MMOL/L (ref 136–145)
TRIGL SERPL-MCNC: 266 MG/DL (ref 0–149)
TSH SERPL-ACNC: 1.41 MIU/L (ref 0.44–3.98)
VIT B12 SERPL-MCNC: 378 PG/ML (ref 211–911)
VLDL: 53 MG/DL (ref 0–40)
WBC # BLD AUTO: 7.6 X10*3/UL (ref 4.4–11.3)

## 2025-01-03 PROCEDURE — 85025 COMPLETE CBC W/AUTO DIFF WBC: CPT

## 2025-01-03 PROCEDURE — G0103 PSA SCREENING: HCPCS

## 2025-01-03 PROCEDURE — 80053 COMPREHEN METABOLIC PANEL: CPT

## 2025-01-03 PROCEDURE — 82570 ASSAY OF URINE CREATININE: CPT

## 2025-01-03 PROCEDURE — 82607 VITAMIN B-12: CPT

## 2025-01-03 PROCEDURE — 82043 UR ALBUMIN QUANTITATIVE: CPT

## 2025-01-03 PROCEDURE — 84443 ASSAY THYROID STIM HORMONE: CPT

## 2025-01-03 PROCEDURE — 80061 LIPID PANEL: CPT

## 2025-01-10 NOTE — PROGRESS NOTES
HPI   58 yo with dm2 (dx in 30's, highest wt 250 lbs), htn, dyslipidemia, cvd, fatty liver disease, migraines.  A1c-8.9% last visit,  7.1% today.     Taking Lantus 80 units qhs, novolog 10 units at meals,  jardiance 25mg, Mounjaro 5mg weekly (since last visit).  -OFF pioglitazone 30mg -pt stopped d/t concerns w/ his ?chf,       Dexcom g7 data 14 days:  89% in range, <2% low, pattern: low 100's overnight, slight bump with breakast, low/mid 100's into bedtime.    Taking crestor 40 mg and rx fish oil for lipids.     Taking atenolol/chlorthalidone 100/25mg, losartan 25mg, and prn nitroglycerin for cvd/htn.  Follows regularly with cardiology.          Current Outpatient Medications:     albuterol 90 mcg/actuation inhaler, INHALE 2 PUFFS EVERY 4 HOURS AS NEEDED, Disp: 18 g, Rfl: 3    aspirin 81 mg EC tablet, Take 1 tablet (81 mg) by mouth once daily. For 30 days, Disp: , Rfl:     atenoloL-chlorthalidone (Tenoretic) 100-25 mg tablet, Take 1 tablet by mouth once daily., Disp: 90 tablet, Rfl: 3    DULoxetine (Cymbalta) 60 mg DR capsule, Take 1 capsule (60 mg) by mouth once daily. For 30 days, Disp: , Rfl:      mg tablet, TAKE 1 TABLET THREE TIMES DAILY AS NEEDED. TAKE WITH FOOD OR MILK., Disp: 270 tablet, Rfl: 3    insulin glargine (Lantus Solostar U-100 Insulin) 100 unit/mL (3 mL) pen, INJECT 90 UNITS UNDER THE SKIN ONCE DAILY AT BEDTIME., Disp: 60 mL, Rfl: 1    Jardiance 25 mg, TAKE 1 TABLET EVERY DAY, Disp: 90 tablet, Rfl: 3    losartan (Cozaar) 25 mg tablet, Take 1 tablet (25 mg) by mouth once daily., Disp: 90 tablet, Rfl: 3    methocarbamol (Robaxin) 750 mg tablet, TAKE 1 TABLET EVERY 4 HOURS AS NEEDED, Disp: 270 tablet, Rfl: 3    nitroglycerin (Nitrostat) 0.4 mg SL tablet, Place 1 tablet (0.4 mg) under the tongue every 5 minutes if needed for chest pain. For 90 days, Disp: 90 tablet, Rfl: 3    NovoLOG Flexpen U-100 Insulin 100 unit/mL (3 mL) pen, USE AS DIRECTED BY YOUR PRESCRIBER. COMPLETE DIRECTIONS ARE  "INCLUDED IN A LETTER WITH YOUR ORIGINAL ORDER, Disp: 30 mL, Rfl: 3    omeprazole (PriLOSEC) 40 mg DR capsule, TAKE 1 CAPSULE EVERY DAY, Disp: 90 capsule, Rfl: 3    pen needle 1/2\" (BD Ultra-Fine Orig Pen Needle) 29G X 12mm needle, Use as instructed, Disp: 100 each, Rfl: 3    phenytoin ER (Dilantin) 100 mg capsule, TAKE 1 CAPSULE EVERY DAY, Disp: 90 capsule, Rfl: 3    QUEtiapine (SEROquel) 100 mg tablet, Take 1 tablet (100 mg) by mouth once daily. For 30 days, Disp: , Rfl:     rosuvastatin (Crestor) 40 mg tablet, TAKE 1 TABLET EVERY DAY, Disp: 90 tablet, Rfl: 3    SUMAtriptan (Imitrex) 50 mg tablet, Take 1 tablet (50 mg) by mouth once daily as needed. At least 2 hours between doses for 60 days, Disp: , Rfl:     tamsulosin (Flomax) 0.4 mg 24 hr capsule, TAKE 1 CAPSULE EVERY DAY, Disp: 90 capsule, Rfl: 3    tirzepatide (Mounjaro) 5 mg/0.5 mL pen injector, Inject 5 mg under the skin 1 (one) time per week., Disp: 6 mL, Rfl: 1    gabapentin (Neurontin) 600 mg tablet, TAKE 1 TABLET THREE TIMES DAILY (Patient not taking: Reported on 1/13/2025), Disp: 270 tablet, Rfl: 3    Vascepa 1 gram capsule, TAKE 2 CAPSULES TWICE DAILY WITH MEALS (Patient not taking: Reported on 1/13/2025), Disp: 360 capsule, Rfl: 3      Allergies as of 01/13/2025 - Reviewed 01/13/2025   Allergen Reaction Noted    Varenicline Other 08/24/2023    Metformin hcl Other 08/24/2023    Tramadol Other and Nausea/vomiting 08/24/2023         Review of Systems   Cardiology: Lightheadedness-at times.  Chest pain-denies.  Leg edema-denies.  Palpitations-denies.  Respiratory: Cough-denies. Shortness of breath-denies.  Wheezing-denies.  Gastroenterology: Constipation at times  Diarrhea-denies.  Heartburn-denies.  Endocrinology: Cold intolerance-denies.  Heat intolerance-denies.  Sweats-denies.  Neurology: Headache-denies.  Tremor-denies.  Neuropathy in extremities-denies.  Psychology: Low energy-denies.  Irritability-denies.  Sleep disturbances-denies.      BP 92/58 " "(BP Location: Right arm, Patient Position: Sitting)   Pulse 75   Ht 1.803 m (5' 11\")   Wt 94.8 kg (209 lb)   BMI 29.15 kg/m²       Labs:  Lab Results   Component Value Date    WBC 7.6 01/03/2025    NRBC 0.0 01/03/2025    RBC 5.56 01/03/2025    HGB 16.9 01/03/2025    HCT 49.7 01/03/2025     01/03/2025     Lab Results   Component Value Date    CALCIUM 9.2 01/03/2025    AST 15 01/03/2025    ALKPHOS 56 01/03/2025    BILITOT 0.4 01/03/2025    PROT 6.3 (L) 01/03/2025    ALBUMIN 4.2 01/03/2025    GLOB 2.7 09/06/2023    AGR 1.7 09/06/2023     01/03/2025    K 4.1 01/03/2025     01/03/2025    CO2 29 01/03/2025    ANIONGAP 12 01/03/2025    BUN 11 01/03/2025    CREATININE 0.79 01/03/2025    UREACREAUR 17.8 09/06/2023    GLUCOSE 181 (H) 01/03/2025    ALT 15 01/03/2025    EGFR >90 01/03/2025     Lab Results   Component Value Date    CHOL 113 01/03/2025    TRIG 266 (H) 01/03/2025    HDL 36.8 01/03/2025    LDLCALC 23 01/03/2025     Lab Results   Component Value Date    MICROALBCREA  01/03/2025      Comment:      One or more analytes used in this calculation is outside of the analytical measurement range. Calculation cannot be performed.     Lab Results   Component Value Date    TSH 1.41 01/03/2025     Lab Results   Component Value Date    QPIXSPHF28 378 01/03/2025     Lab Results   Component Value Date    HGBA1C 7.1 (A) 01/13/2025         Physical Exam   General Appearance: pleasant, cooperative, no acute distress  HEENT: no chemosis, no proptosis, no lid lag, no lid retraction  Neck: no lymphadenopathy, no thyromegaly, no dominant thyroid nodules  Heart: no murmurs, regular rate and rhythm, S1 and S2  Lungs: no wheezes, no rhonci, no rales  Extremities: no lower extremity swelling      Assessment/Plan   1. Type 2 diabetes mellitus with hyperglycemia, with long-term current use of insulin (Primary)  -A1c ordered and reviewed  -glycemic log reviewed  -labs reviewed  -dexcom data reviewed    -overall improving, " increase to 7.5mg mounjaro  -lower basal in 5 unit intervals if am sugars <100, lower prandial the same if daytime sugars <100  -lower basal from 80 to 75 units    2. Essential hypertension  -borderline low on therapy  -recently saw cardiology, low threshold to lower tenoretic    3. Mixed hyperlipidemia  -at target on statin, no change labs reviewed, will follow         Follow Up:  pharm[facility]NABE 6 months    Medical Decision Making  Complexity of problem: Chronic illness of diabetes mellitus uncontrolled, progressing  Data analyzed and reviewed: Reviewed prior notes, blood glucose data, labs including HgbA1c, lipids, serum chemistries.  Ordered tests.   Risk of complications and morbidities: Is definite because of use of insulin and risk of hypoglycemia.  Prescription medications reviewed and modifications made.  Compliance assessed.  Addressed social determinants of health including food insecurity.

## 2025-01-13 ENCOUNTER — APPOINTMENT (OUTPATIENT)
Dept: ENDOCRINOLOGY | Facility: CLINIC | Age: 60
End: 2025-01-13
Payer: MEDICARE

## 2025-01-13 VITALS
DIASTOLIC BLOOD PRESSURE: 58 MMHG | HEIGHT: 71 IN | HEART RATE: 75 BPM | SYSTOLIC BLOOD PRESSURE: 92 MMHG | BODY MASS INDEX: 29.26 KG/M2 | WEIGHT: 209 LBS

## 2025-01-13 DIAGNOSIS — Z79.4 TYPE 2 DIABETES MELLITUS WITH HYPERGLYCEMIA, WITH LONG-TERM CURRENT USE OF INSULIN: Primary | ICD-10-CM

## 2025-01-13 DIAGNOSIS — I10 ESSENTIAL HYPERTENSION: ICD-10-CM

## 2025-01-13 DIAGNOSIS — E78.2 MIXED HYPERLIPIDEMIA: ICD-10-CM

## 2025-01-13 DIAGNOSIS — E11.65 TYPE 2 DIABETES MELLITUS WITH HYPERGLYCEMIA, WITH LONG-TERM CURRENT USE OF INSULIN: Primary | ICD-10-CM

## 2025-01-13 LAB — POC HEMOGLOBIN A1C: 7.1 % (ref 4.2–6.5)

## 2025-01-13 PROCEDURE — 99214 OFFICE O/P EST MOD 30 MIN: CPT | Performed by: INTERNAL MEDICINE

## 2025-01-13 PROCEDURE — 3008F BODY MASS INDEX DOCD: CPT | Performed by: INTERNAL MEDICINE

## 2025-01-13 PROCEDURE — 3074F SYST BP LT 130 MM HG: CPT | Performed by: INTERNAL MEDICINE

## 2025-01-13 PROCEDURE — 3048F LDL-C <100 MG/DL: CPT | Performed by: INTERNAL MEDICINE

## 2025-01-13 PROCEDURE — 3062F POS MACROALBUMINURIA REV: CPT | Performed by: INTERNAL MEDICINE

## 2025-01-13 PROCEDURE — 4010F ACE/ARB THERAPY RXD/TAKEN: CPT | Performed by: INTERNAL MEDICINE

## 2025-01-13 PROCEDURE — 83036 HEMOGLOBIN GLYCOSYLATED A1C: CPT | Performed by: INTERNAL MEDICINE

## 2025-01-13 PROCEDURE — 3078F DIAST BP <80 MM HG: CPT | Performed by: INTERNAL MEDICINE

## 2025-01-13 PROCEDURE — 95251 CONT GLUC MNTR ANALYSIS I&R: CPT | Performed by: INTERNAL MEDICINE

## 2025-01-13 RX ORDER — TIRZEPATIDE 7.5 MG/.5ML
7.5 INJECTION, SOLUTION SUBCUTANEOUS
Qty: 6 ML | Refills: 1 | Status: SHIPPED | OUTPATIENT
Start: 2025-01-13

## 2025-01-13 ASSESSMENT — ENCOUNTER SYMPTOMS
OCCASIONAL FEELINGS OF UNSTEADINESS: 0
DEPRESSION: 0
LOSS OF SENSATION IN FEET: 0

## 2025-01-13 ASSESSMENT — PAIN SCALES - GENERAL: PAINLEVEL_OUTOF10: 0-NO PAIN

## 2025-01-13 ASSESSMENT — PATIENT HEALTH QUESTIONNAIRE - PHQ9
1. LITTLE INTEREST OR PLEASURE IN DOING THINGS: NOT AT ALL
SUM OF ALL RESPONSES TO PHQ9 QUESTIONS 1 & 2: 0
2. FEELING DOWN, DEPRESSED OR HOPELESS: NOT AT ALL

## 2025-01-15 DIAGNOSIS — E11.9 ALTERED METABOLISM DUE TO DIABETES (MULTI): ICD-10-CM

## 2025-01-15 RX ORDER — INSULIN GLARGINE 100 [IU]/ML
INJECTION, SOLUTION SUBCUTANEOUS
Qty: 90 ML | Refills: 3 | Status: SHIPPED | OUTPATIENT
Start: 2025-01-15

## 2025-01-24 ENCOUNTER — TELEPHONE (OUTPATIENT)
Dept: PRIMARY CARE | Facility: CLINIC | Age: 60
End: 2025-01-24
Payer: MEDICARE

## 2025-01-24 NOTE — TELEPHONE ENCOUNTER
Asking we review his PSA result he saw value on mychart but is unsure if tat is trending okay since he had issues a few years back would like clarification

## 2025-02-11 DIAGNOSIS — E11.9 ALTERED METABOLISM DUE TO DIABETES (MULTI): ICD-10-CM

## 2025-02-11 RX ORDER — INSULIN ASPART 100 [IU]/ML
INJECTION, SOLUTION INTRAVENOUS; SUBCUTANEOUS
Qty: 45 ML | Refills: 1 | Status: SHIPPED | OUTPATIENT
Start: 2025-02-11

## 2025-02-13 DIAGNOSIS — I20.9 ANGINA PECTORIS: ICD-10-CM

## 2025-02-13 DIAGNOSIS — R07.9 CHEST PAIN, UNSPECIFIED TYPE: ICD-10-CM

## 2025-02-13 NOTE — TELEPHONE ENCOUNTER
[email or web address]      Requested Prescriptions     Pending Prescriptions Disp Refills    nitroglycerin (Nitrostat) 0.4 mg SL tablet 25 tablet 3     Sig: Place 1 tablet (0.4 mg) under the tongue every 5 minutes if needed for chest pain. For 90 days

## 2025-02-14 RX ORDER — NITROGLYCERIN 0.4 MG/1
0.4 TABLET SUBLINGUAL EVERY 5 MIN PRN
Qty: 25 TABLET | Refills: 3 | Status: SHIPPED | OUTPATIENT
Start: 2025-02-14 | End: 2026-02-09

## 2025-02-17 ENCOUNTER — APPOINTMENT (OUTPATIENT)
Dept: ORTHOPEDIC SURGERY | Facility: CLINIC | Age: 60
End: 2025-02-17
Payer: MEDICARE

## 2025-02-17 DIAGNOSIS — G54.2 CERVICAL NERVE ROOT IMPINGEMENT: ICD-10-CM

## 2025-02-17 DIAGNOSIS — M75.121 COMPLETE TEAR OF RIGHT ROTATOR CUFF, UNSPECIFIED WHETHER TRAUMATIC: Primary | ICD-10-CM

## 2025-02-17 PROCEDURE — 99213 OFFICE O/P EST LOW 20 MIN: CPT | Performed by: ORTHOPAEDIC SURGERY

## 2025-02-17 PROCEDURE — 3048F LDL-C <100 MG/DL: CPT | Performed by: ORTHOPAEDIC SURGERY

## 2025-02-17 PROCEDURE — 4010F ACE/ARB THERAPY RXD/TAKEN: CPT | Performed by: ORTHOPAEDIC SURGERY

## 2025-02-17 PROCEDURE — 3062F POS MACROALBUMINURIA REV: CPT | Performed by: ORTHOPAEDIC SURGERY

## 2025-02-17 ASSESSMENT — ENCOUNTER SYMPTOMS
SHORTNESS OF BREATH: 0
FATIGUE: 0
SINUS PAIN: 0
CHILLS: 0
WHEEZING: 0
FEVER: 0
WOUND: 0
ARTHRALGIAS: 1
SORE THROAT: 0

## 2025-02-17 ASSESSMENT — PAIN - FUNCTIONAL ASSESSMENT: PAIN_FUNCTIONAL_ASSESSMENT: 0-10

## 2025-02-17 ASSESSMENT — PAIN SCALES - GENERAL: PAINLEVEL_OUTOF10: 6
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
## 2025-03-05 DIAGNOSIS — J45.20 MILD INTERMITTENT ASTHMA WITHOUT COMPLICATION (HHS-HCC): ICD-10-CM

## 2025-03-06 RX ORDER — ALBUTEROL SULFATE 90 UG/1
2 INHALANT RESPIRATORY (INHALATION) EVERY 4 HOURS PRN
OUTPATIENT
Start: 2025-03-06

## 2025-03-11 ENCOUNTER — OFFICE VISIT (OUTPATIENT)
Dept: PAIN MEDICINE | Facility: HOSPITAL | Age: 60
End: 2025-03-11
Payer: MEDICARE

## 2025-03-11 DIAGNOSIS — M54.12 CERVICAL RADICULOPATHY: ICD-10-CM

## 2025-03-11 PROCEDURE — 3062F POS MACROALBUMINURIA REV: CPT | Performed by: ANESTHESIOLOGY

## 2025-03-11 PROCEDURE — 3048F LDL-C <100 MG/DL: CPT | Performed by: ANESTHESIOLOGY

## 2025-03-11 PROCEDURE — 99204 OFFICE O/P NEW MOD 45 MIN: CPT | Performed by: ANESTHESIOLOGY

## 2025-03-11 PROCEDURE — 4010F ACE/ARB THERAPY RXD/TAKEN: CPT | Performed by: ANESTHESIOLOGY

## 2025-03-11 PROCEDURE — 99214 OFFICE O/P EST MOD 30 MIN: CPT | Performed by: ANESTHESIOLOGY

## 2025-03-11 ASSESSMENT — PAIN SCALES - GENERAL: PAINLEVEL_OUTOF10: 7
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
## 2025-03-12 ENCOUNTER — OFFICE VISIT (OUTPATIENT)
Dept: PRIMARY CARE | Facility: CLINIC | Age: 60
End: 2025-03-12
Payer: MEDICARE

## 2025-03-12 ENCOUNTER — HOSPITAL ENCOUNTER (OUTPATIENT)
Dept: RADIOLOGY | Facility: CLINIC | Age: 60
Discharge: HOME | End: 2025-03-12
Payer: MEDICARE

## 2025-03-12 VITALS
BODY MASS INDEX: 28.7 KG/M2 | SYSTOLIC BLOOD PRESSURE: 102 MMHG | DIASTOLIC BLOOD PRESSURE: 54 MMHG | HEIGHT: 71 IN | OXYGEN SATURATION: 99 % | HEART RATE: 82 BPM | WEIGHT: 205 LBS

## 2025-03-12 DIAGNOSIS — F17.200 TOBACCO USE DISORDER: ICD-10-CM

## 2025-03-12 DIAGNOSIS — E78.2 MIXED HYPERLIPIDEMIA: ICD-10-CM

## 2025-03-12 DIAGNOSIS — E11.9 ALTERED METABOLISM DUE TO DIABETES (MULTI): ICD-10-CM

## 2025-03-12 DIAGNOSIS — J41.1 MUCOPURULENT CHRONIC BRONCHITIS (MULTI): ICD-10-CM

## 2025-03-12 DIAGNOSIS — Z79.4 TYPE 2 DIABETES MELLITUS WITH HYPERGLYCEMIA, WITH LONG-TERM CURRENT USE OF INSULIN: ICD-10-CM

## 2025-03-12 DIAGNOSIS — K21.9 GASTROESOPHAGEAL REFLUX DISEASE, UNSPECIFIED WHETHER ESOPHAGITIS PRESENT: ICD-10-CM

## 2025-03-12 DIAGNOSIS — N40.1 BENIGN LOCALIZED PROSTATIC HYPERPLASIA WITH LOWER URINARY TRACT SYMPTOMS (LUTS): ICD-10-CM

## 2025-03-12 DIAGNOSIS — I10 ESSENTIAL HYPERTENSION: ICD-10-CM

## 2025-03-12 DIAGNOSIS — I73.9 PERIPHERAL VASCULAR DISEASE (CMS-HCC): ICD-10-CM

## 2025-03-12 DIAGNOSIS — R00.2 PALPITATIONS: ICD-10-CM

## 2025-03-12 DIAGNOSIS — F41.9 ANXIETY AND DEPRESSION: Primary | ICD-10-CM

## 2025-03-12 DIAGNOSIS — F32.A ANXIETY AND DEPRESSION: Primary | ICD-10-CM

## 2025-03-12 DIAGNOSIS — E11.65 TYPE 2 DIABETES MELLITUS WITH HYPERGLYCEMIA, WITH LONG-TERM CURRENT USE OF INSULIN: ICD-10-CM

## 2025-03-12 PROCEDURE — 4010F ACE/ARB THERAPY RXD/TAKEN: CPT | Performed by: FAMILY MEDICINE

## 2025-03-12 PROCEDURE — G2211 COMPLEX E/M VISIT ADD ON: HCPCS | Performed by: FAMILY MEDICINE

## 2025-03-12 PROCEDURE — 3008F BODY MASS INDEX DOCD: CPT | Performed by: FAMILY MEDICINE

## 2025-03-12 PROCEDURE — 71046 X-RAY EXAM CHEST 2 VIEWS: CPT

## 2025-03-12 PROCEDURE — 99214 OFFICE O/P EST MOD 30 MIN: CPT | Mod: 25 | Performed by: FAMILY MEDICINE

## 2025-03-12 PROCEDURE — 99214 OFFICE O/P EST MOD 30 MIN: CPT | Performed by: FAMILY MEDICINE

## 2025-03-12 PROCEDURE — 3074F SYST BP LT 130 MM HG: CPT | Performed by: FAMILY MEDICINE

## 2025-03-12 PROCEDURE — 3048F LDL-C <100 MG/DL: CPT | Performed by: FAMILY MEDICINE

## 2025-03-12 PROCEDURE — 3062F POS MACROALBUMINURIA REV: CPT | Performed by: FAMILY MEDICINE

## 2025-03-12 PROCEDURE — 3078F DIAST BP <80 MM HG: CPT | Performed by: FAMILY MEDICINE

## 2025-03-12 RX ORDER — LOSARTAN POTASSIUM 25 MG/1
25 TABLET ORAL DAILY
Qty: 90 TABLET | Refills: 1 | Status: SHIPPED | OUTPATIENT
Start: 2025-03-12 | End: 2025-09-08

## 2025-03-12 RX ORDER — ROSUVASTATIN CALCIUM 40 MG/1
40 TABLET, COATED ORAL DAILY
Qty: 90 TABLET | Refills: 1 | Status: SHIPPED | OUTPATIENT
Start: 2025-03-12

## 2025-03-12 RX ORDER — TAMSULOSIN HYDROCHLORIDE 0.4 MG/1
0.8 CAPSULE ORAL DAILY
Qty: 90 CAPSULE | Refills: 1 | Status: SHIPPED | OUTPATIENT
Start: 2025-03-12

## 2025-03-12 RX ORDER — ATENOLOL AND CHLORTHALIDONE TABLET 50; 25 MG/1; MG/1
1 TABLET ORAL DAILY
Qty: 90 TABLET | Refills: 1 | Status: SHIPPED | OUTPATIENT
Start: 2025-03-12 | End: 2025-09-08

## 2025-03-12 RX ORDER — ICOSAPENT ETHYL 1 G/1
2 CAPSULE ORAL
Qty: 360 CAPSULE | Refills: 1 | Status: SHIPPED | OUTPATIENT
Start: 2025-03-12 | End: 2025-09-08

## 2025-03-12 RX ORDER — DULOXETIN HYDROCHLORIDE 60 MG/1
60 CAPSULE, DELAYED RELEASE ORAL DAILY
Qty: 90 CAPSULE | Refills: 1 | Status: SHIPPED | OUTPATIENT
Start: 2025-03-12 | End: 2025-09-08

## 2025-03-12 RX ORDER — QUETIAPINE FUMARATE 100 MG/1
100 TABLET, FILM COATED ORAL NIGHTLY
Qty: 90 TABLET | Refills: 1 | Status: SHIPPED | OUTPATIENT
Start: 2025-03-12 | End: 2025-09-08

## 2025-03-12 RX ORDER — OMEPRAZOLE 40 MG/1
40 CAPSULE, DELAYED RELEASE ORAL DAILY
Qty: 90 CAPSULE | Refills: 1 | Status: SHIPPED | OUTPATIENT
Start: 2025-03-12

## 2025-03-12 ASSESSMENT — ENCOUNTER SYMPTOMS
OCCASIONAL FEELINGS OF UNSTEADINESS: 0
LOSS OF SENSATION IN FEET: 0
DEPRESSION: 0

## 2025-03-12 ASSESSMENT — PATIENT HEALTH QUESTIONNAIRE - PHQ9
SUM OF ALL RESPONSES TO PHQ9 QUESTIONS 1 AND 2: 0
2. FEELING DOWN, DEPRESSED OR HOPELESS: NOT AT ALL
1. LITTLE INTEREST OR PLEASURE IN DOING THINGS: NOT AT ALL

## 2025-03-12 ASSESSMENT — PAIN SCALES - GENERAL: PAINLEVEL_OUTOF10: 8

## 2025-03-12 ASSESSMENT — COLUMBIA-SUICIDE SEVERITY RATING SCALE - C-SSRS
2. HAVE YOU ACTUALLY HAD ANY THOUGHTS OF KILLING YOURSELF?: NO
6. HAVE YOU EVER DONE ANYTHING, STARTED TO DO ANYTHING, OR PREPARED TO DO ANYTHING TO END YOUR LIFE?: NO
1. IN THE PAST MONTH, HAVE YOU WISHED YOU WERE DEAD OR WISHED YOU COULD GO TO SLEEP AND NOT WAKE UP?: NO

## 2025-03-12 NOTE — PROGRESS NOTES
59-year old presents for establishing care and follow-up on medical conditions    1. Anxiety and depression   Currently on Cymbalta and Seroquel symptoms well-controlled no SI or HI   2. Benign localized prostatic hyperplasia with lower urinary tract symptoms (LUTS)   Currently on 0.4 mg Flomax daily.  Symptoms better controlled however still not experiencing resolution of symptoms experiencing weak urinary stream and nocturia.   3. Type 2 diabetes mellitus with hyperglycemia, with long-term current use of insulin   Follows with endocrinology regularly most recent A1c of 7.1   4. Altered metabolism due to diabetes (Multi)    5. Palpitations   Follows with cardiology regularly   6. Essential hypertension   Currently multiple blood pressure medications blood pressure today is 102/54 and he has been experiencing orthostatic dizziness   7. Peripheral vascular disease (CMS-HCC)    8. Mixed hyperlipidemia   On high intensity statin therapy goal LDL less than 70   9. Tobacco use disorder    10. Gastroesophageal reflux disease, unspecified whether esophagitis present   Typically well-controlled on 40 mg omeprazole however over the last couple days experiencing increasing reflux   11. Mucopurulent chronic bronchitis (Multi)   Chronic productive cough still smoking.  Currently only on albuterol has not had PFTs.           All pertinent positive symptoms are included in history of present illness.    All other systems have been reviewed and are negative and noncontributory to this patient's current ailments.    CONSTITUTIONAL - INAD. Not ill appearing.  SKIN - No lesions or rashes visualized. Good skin turgor.  HEENT- Head is atraumatic and normocephalic. Nasal turbinates are nonerythematous and without drainage. .  RESP - CTAB. No wheezing, rhonchi, or crackles.   CARDIAC - RRR. No murmurs, gallops, or rubs.  ABDOMEN - Soft, nontender, nondistended. No organomegaly.  NEURO - CNs 2-12 grossly intact.  PSYCH - Normal mood and  affect      1. Benign localized prostatic hyperplasia with lower urinary tract symptoms (LUTS)  Increase Flomax to 0.8 mg nightly  - tamsulosin (Flomax) 0.4 mg 24 hr capsule; Take 2 capsules (0.8 mg) by mouth once daily.  Dispense: 90 capsule; Refill: 1    2. Type 2 diabetes mellitus with hyperglycemia, with long-term current use of insulin  Stable continue follow-up with endocrinology    3. Altered metabolism due to diabetes (Multi)    - icosapent ethyL (Vascepa) 1 gram capsule; Take 2 capsules (2 g) by mouth 2 times daily (morning and late afternoon).  Dispense: 360 capsule; Refill: 1    4. Palpitations    - losartan (Cozaar) 25 mg tablet; Take 1 tablet (25 mg) by mouth once daily.  Dispense: 90 tablet; Refill: 1    5. Essential hypertension  Experiencing orthostatic dizziness blood pressures are soft.  Will reduce the dose of atenolol today to 50 mg.  Continue all other medications as prescribed    - atenoloL-chlorthalidone (Tenoretic 50) 50-25 mg tablet; Take 1 tablet by mouth once daily.  Dispense: 90 tablet; Refill: 1  - losartan (Cozaar) 25 mg tablet; Take 1 tablet (25 mg) by mouth once daily.  Dispense: 90 tablet; Refill: 1    6. Peripheral vascular disease (CMS-HCC)  Stable continue losartan  - losartan (Cozaar) 25 mg tablet; Take 1 tablet (25 mg) by mouth once daily.  Dispense: 90 tablet; Refill: 1    7. Mixed hyperlipidemia  Goal LDL less than 70 continue high intensity Crestor  - losartan (Cozaar) 25 mg tablet; Take 1 tablet (25 mg) by mouth once daily.  Dispense: 90 tablet; Refill: 1  - rosuvastatin (Crestor) 40 mg tablet; Take 1 tablet (40 mg) by mouth once daily.  Dispense: 90 tablet; Refill: 1    8. Tobacco use disorder    - losartan (Cozaar) 25 mg tablet; Take 1 tablet (25 mg) by mouth once daily.  Dispense: 90 tablet; Refill: 1    9. Gastroesophageal reflux disease, unspecified whether esophagitis present  Typically stable continue omeprazole however if acutely worsening can switch to a different  PPI  - omeprazole (PriLOSEC) 40 mg DR capsule; Take 1 capsule (40 mg) by mouth once daily.  Dispense: 90 capsule; Refill: 1    10. Anxiety and depression (Primary)  Stable continue Cymbalta and Seroquel  - DULoxetine (Cymbalta) 60 mg DR capsule; Take 1 capsule (60 mg) by mouth once daily. For 30 days  Dispense: 90 capsule; Refill: 1  - QUEtiapine (SEROquel) 100 mg tablet; Take 1 tablet (100 mg) by mouth once daily at bedtime. For 30 days  Dispense: 90 tablet; Refill: 1    11. Mucopurulent chronic bronchitis (Multi)  Will get chest x-ray and pulmonary function testing.  Should consider LABA/LAMA  - Complete Pulmonary Function Test Pre/Post Bronchodilator (Spirometry Pre/Post/DLCO/Lung Volumes); Future  - XR chest 2 views; Future

## 2025-03-14 NOTE — RESULT ENCOUNTER NOTE
X-ray of the chest is normal without signs of pneumonia or other abnormality.  Continue conservative management as prescribed and follow-up if not improving

## 2025-03-18 ENCOUNTER — APPOINTMENT (OUTPATIENT)
Dept: PRIMARY CARE | Facility: CLINIC | Age: 60
End: 2025-03-18
Payer: MEDICARE

## 2025-03-20 ENCOUNTER — HOSPITAL ENCOUNTER (OUTPATIENT)
Dept: RADIOLOGY | Facility: HOSPITAL | Age: 60
Discharge: HOME | End: 2025-03-20
Payer: MEDICARE

## 2025-03-20 DIAGNOSIS — M54.12 CERVICAL RADICULOPATHY: ICD-10-CM

## 2025-03-20 PROCEDURE — 72141 MRI NECK SPINE W/O DYE: CPT

## 2025-03-27 ENCOUNTER — HOSPITAL ENCOUNTER (OUTPATIENT)
Dept: RESPIRATORY THERAPY | Facility: HOSPITAL | Age: 60
Setting detail: THERAPIES SERIES
Discharge: HOME | End: 2025-03-27
Payer: MEDICARE

## 2025-03-27 DIAGNOSIS — J41.1 MUCOPURULENT CHRONIC BRONCHITIS (MULTI): ICD-10-CM

## 2025-03-27 PROCEDURE — 94726 PLETHYSMOGRAPHY LUNG VOLUMES: CPT

## 2025-04-17 ENCOUNTER — OFFICE VISIT (OUTPATIENT)
Dept: PAIN MEDICINE | Facility: HOSPITAL | Age: 60
End: 2025-04-17
Payer: MEDICARE

## 2025-04-17 DIAGNOSIS — M54.12 CERVICAL RADICULOPATHY: ICD-10-CM

## 2025-04-17 PROCEDURE — 4010F ACE/ARB THERAPY RXD/TAKEN: CPT | Performed by: ANESTHESIOLOGY

## 2025-04-17 PROCEDURE — 3048F LDL-C <100 MG/DL: CPT | Performed by: ANESTHESIOLOGY

## 2025-04-17 PROCEDURE — 99213 OFFICE O/P EST LOW 20 MIN: CPT | Performed by: ANESTHESIOLOGY

## 2025-04-17 PROCEDURE — 3062F POS MACROALBUMINURIA REV: CPT | Performed by: ANESTHESIOLOGY

## 2025-04-17 PROCEDURE — 3051F HG A1C>EQUAL 7.0%<8.0%: CPT | Performed by: ANESTHESIOLOGY

## 2025-04-17 ASSESSMENT — PAIN SCALES - GENERAL: PAINLEVEL_OUTOF10: 8
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
## 2025-04-18 DIAGNOSIS — M54.16 LUMBAR RADICULOPATHY: ICD-10-CM

## 2025-04-18 RX ORDER — NAPROXEN 500 MG/1
500 TABLET ORAL 2 TIMES DAILY PRN
Qty: 60 TABLET | Refills: 0 | Status: SHIPPED | OUTPATIENT
Start: 2025-04-18 | End: 2026-04-18

## 2025-05-02 ENCOUNTER — HOSPITAL ENCOUNTER (OUTPATIENT)
Facility: HOSPITAL | Age: 60
Discharge: HOME | End: 2025-05-02
Payer: MEDICARE

## 2025-05-02 VITALS
HEIGHT: 71 IN | RESPIRATION RATE: 16 BRPM | WEIGHT: 194 LBS | BODY MASS INDEX: 27.16 KG/M2 | HEART RATE: 86 BPM | DIASTOLIC BLOOD PRESSURE: 52 MMHG | SYSTOLIC BLOOD PRESSURE: 96 MMHG | TEMPERATURE: 98.8 F | OXYGEN SATURATION: 96 %

## 2025-05-02 DIAGNOSIS — M54.12 CERVICAL RADICULOPATHY: ICD-10-CM

## 2025-05-02 PROCEDURE — 62321 NJX INTERLAMINAR CRV/THRC: CPT | Performed by: ANESTHESIOLOGY

## 2025-05-02 PROCEDURE — 2550000001 HC RX 255 CONTRASTS: Performed by: ANESTHESIOLOGY

## 2025-05-02 PROCEDURE — 2500000004 HC RX 250 GENERAL PHARMACY W/ HCPCS (ALT 636 FOR OP/ED): Mod: JZ | Performed by: ANESTHESIOLOGY

## 2025-05-02 RX ORDER — MIDAZOLAM HYDROCHLORIDE 1 MG/ML
INJECTION, SOLUTION INTRAMUSCULAR; INTRAVENOUS
Status: COMPLETED | OUTPATIENT
Start: 2025-05-02 | End: 2025-05-02

## 2025-05-02 RX ORDER — METHYLPREDNISOLONE ACETATE 40 MG/ML
INJECTION, SUSPENSION INTRA-ARTICULAR; INTRALESIONAL; INTRAMUSCULAR; SOFT TISSUE
Status: COMPLETED | OUTPATIENT
Start: 2025-05-02 | End: 2025-05-02

## 2025-05-02 RX ORDER — LIDOCAINE HYDROCHLORIDE 5 MG/ML
INJECTION, SOLUTION INFILTRATION; INTRAVENOUS
Status: COMPLETED | OUTPATIENT
Start: 2025-05-02 | End: 2025-05-02

## 2025-05-02 RX ADMIN — LIDOCAINE HYDROCHLORIDE 3 ML: 5 INJECTION, SOLUTION INFILTRATION at 09:17

## 2025-05-02 RX ADMIN — MIDAZOLAM 2 MG: 1 INJECTION INTRAMUSCULAR; INTRAVENOUS at 09:09

## 2025-05-02 RX ADMIN — METHYLPREDNISOLONE ACETATE 40 MG: 40 INJECTION, SUSPENSION INTRA-ARTICULAR; INTRALESIONAL; INTRAMUSCULAR; SOFT TISSUE at 09:18

## 2025-05-02 RX ADMIN — IOHEXOL 1 ML: 240 INJECTION, SOLUTION INTRATHECAL; INTRAVASCULAR; INTRAVENOUS; ORAL at 09:18

## 2025-05-02 ASSESSMENT — PAIN - FUNCTIONAL ASSESSMENT
PAIN_FUNCTIONAL_ASSESSMENT: WONG-BAKER FACES
PAIN_FUNCTIONAL_ASSESSMENT: 0-10
PAIN_FUNCTIONAL_ASSESSMENT: WONG-BAKER FACES
PAIN_FUNCTIONAL_ASSESSMENT: 0-10
PAIN_FUNCTIONAL_ASSESSMENT: 0-10
PAIN_FUNCTIONAL_ASSESSMENT: WONG-BAKER FACES

## 2025-05-02 ASSESSMENT — PAIN SCALES - GENERAL
PAINLEVEL_OUTOF10: 7
PAINLEVEL_OUTOF10: 7
PAINLEVEL_OUTOF10: 0 - NO PAIN
PAINLEVEL_OUTOF10: 4
PAINLEVEL_OUTOF10: 4
PAINLEVEL_OUTOF10: 0 - NO PAIN

## 2025-05-02 NOTE — H&P
"Pain Management H&P    History Of Present Illness  Orlin Laguerre \"Jeri" is a 59 y.o. male presents for procedure state below. Endorses no changes in past medical history or medical health since last seen in clinic.     ASA: 2   Mallampati: 2    Past Medical History  He has a past medical history of Acute pulmonary edema (08/24/2023), Altered metabolism due to diabetes (Multi), Angina pectoris (08/24/2023), Cervicalgia, Coronary artery disease (08/24/2023), Diabetes (Multi), 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 (11/04/2015), Essential hypertension (08/24/2023), Foreign body in cornea, left eye, initial encounter (11/04/2015), Localized traumatic opacities, right eye (10/26/2015), Mixed hyperlipidemia (08/24/2023), Other conditions influencing health status, Other conditions influencing health status, Pain in unspecified hip, Palpitations (08/24/2023), Peripheral vascular disease ([facility]) (08/24/2023), Personal history of other diseases of the circulatory system, Personal history of other diseases of the digestive system, Personal history of other diseases of the digestive system, Personal history of other diseases of the musculoskeletal system and connective tissue, Personal history of other diseases of the nervous system and sense organs, Personal history of other diseases of the nervous system and sense organs, Personal history of other diseases of the nervous system and sense organs, Personal history of other diseases of the respiratory system, Personal history of other mental and behavioral disorders, Shortness of breath (01/16/2024), Systolic heart failure (08/22/2022), Unspecified blepharitis left eye, unspecified eyelid (10/14/2015), Unspecified blepharitis left lower eyelid (10/14/2015), Unspecified blepharitis left lower eyelid (10/14/2015), Unspecified blepharitis left upper eyelid (10/14/2015), Unspecified blepharitis left upper eyelid (10/14/2015), Unspecified blepharitis right lower eyelid " (10/14/2015), Unspecified blepharitis right lower eyelid (10/14/2015), Unspecified blepharitis right upper eyelid (10/14/2015), Unspecified blepharitis right upper eyelid (10/14/2015), Unspecified injury of left eye and orbit, initial encounter (11/04/2015), and Vasovagal syncope (01/16/2024).    Surgical History  He has a past surgical history that includes CT guided chest tube placement (12/21/2017) and CT guided percutaneous peritoneal or retroperitoneal fluid collection drainage (12/21/2017).     Social History  He reports that he has been smoking cigarettes. He started smoking about 46 years ago. He has a 91.2 pack-year smoking history. He has never been exposed to tobacco smoke. He has never used smokeless tobacco. He reports current alcohol use. He reports current drug use. Drug: Marijuana.    Family History  Family History[1]     Allergies  Varenicline, Metformin hcl, and Tramadol    Review of Symptoms:   Constitutional: Negative for chills, diaphoresis or fever  HENT: Negative for neck swelling  Eyes:.  Negative for eye pain  Respiratory:.  Negative for cough, shortness of breath or wheezing    Cardiovascular:.  Negative for chest pain or palpitations  Gastrointestinal:.  Negative for abdominal pain, nausea and vomiting  Genitourinary:.  Negative for urgency  Musculoskeletal:  Positive for back pain. Positive for joint pain. Denies falls within the past 3 months.  Skin: Negative for wounds or itching   Neurological: Negative for dizziness, seizures, loss of consciousness and weakness  Endo/Heme/Allergies: Does not bruise/bleed easily  Psychiatric/Behavioral: Negative for depression. The patient does not appear anxious.       PHYSICAL EXAM  Vitals signs reviewed  Constitutional:       General: Not in acute distress     Appearance: Normal appearance. Not ill-appearing.  HENT:     Head: Normocephalic and atraumatic  Eyes:     Conjunctiva/sclera: Conjunctivae normal  Cardiovascular:     Rate and Rhythm: Normal  "rate and regular rhythm  Pulmonary:     Effort: No respiratory distress  Abdominal:     Palpations: Abdomen is soft  Musculoskeletal: ANTOINE  Skin:     General: Skin is warm and dry  Neurological:     General: No focal deficit present  Psychiatric:         Mood and Affect: Mood normal         Behavior: Behavior normal     Last Recorded Vitals  There were no vitals taken for this visit.    Relevant Results  Current Outpatient Medications   Medication Instructions    albuterol 90 mcg/actuation inhaler 2 puffs, inhalation, Every 4 hours PRN    aspirin 81 mg EC tablet 1 tablet, Daily    atenoloL-chlorthalidone (Tenoretic 50) 50-25 mg tablet 1 tablet, oral, Daily    DULoxetine (CYMBALTA) 60 mg, oral, Daily, For 30 days     mg tablet TAKE 1 TABLET THREE TIMES DAILY AS NEEDED. TAKE WITH FOOD OR MILK.    icosapent ethyL (VASCEPA) 2 g, oral, 2 times daily (morning and late afternoon)    insulin aspart (NovoLOG Flexpen U-100 Insulin) 100 unit/mL (3 mL) pen Up to 50 units daily as directed    Jardiance 25 mg, oral, Daily    Lantus Solostar U-100 Insulin 100 unit/mL (3 mL) pen INJECT 90 UNITS UNDER THE SKIN ONCE DAILY AT BEDTIME.    losartan (COZAAR) 25 mg, oral, Daily    methocarbamol (ROBAXIN) 750 mg, oral, Every 4 hours PRN    Mounjaro 7.5 mg, subcutaneous, Once Weekly    naproxen (NAPROSYN) 500 mg, oral, 2 times daily PRN, Do not exceed more than 2 pills  a day    nitroglycerin (NITROSTAT) 0.4 mg, sublingual, Every 5 min PRN, For 90 days    omeprazole (PRILOSEC) 40 mg, oral, Daily    pen needle 1/2\" (BD Ultra-Fine Orig Pen Needle) 29G X 12mm needle Use as instructed    phenytoin ER (DILANTIN) 100 mg, oral, Daily    QUEtiapine (SEROQUEL) 100 mg, oral, Nightly, For 30 days    rosuvastatin (CRESTOR) 40 mg, oral, Daily    SUMAtriptan (Imitrex) 50 mg tablet 1 tablet, Daily PRN    tamsulosin (FLOMAX) 0.8 mg, oral, Daily         MR cervical spine wo IV contrast 03/20/2025    Narrative  Interpreted By:  Derek, " Orion,  STUDY:  MR CERVICAL SPINE WO IV CONTRAST; ;  3/20/2025 6:50 pm    INDICATION:  Signs/Symptoms:neck pain.    ,M54.12 Radiculopathy, cervical region    COMPARISON:  MRI cervical spine from 11/21/2012.    ACCESSION NUMBER(S):  [id number]    ORDERING CLINICIAN:  WILLI HIGH    TECHNIQUE:  MRI of the cervical spine was performed with the acquisition of  sagittal T2, sagittal T1, sagittal STIR, axial T1, and axial T2  weighted sequences.    FINDINGS:  There is mild retrolisthesis at C3-C4. There is mild intervertebral  disc height narrowing at C6-C7 with chronic degenerative endplate  changes including osteophytic spurring. There are also mild type 1  Modic changes at the level of C6-C7 and C3-C4. There is disc  desiccation at multiple levels. There are chronic degenerative  endplate changes at other levels including osteophytic spurring.  Marrow signal is within normal limits.    Cervical spinal cord is normal in signal and caliber.    At C2-C3, there is a small diffuse disc bulge. There is no spinal  canal stenosis or neural foraminal narrowing. There is no facet  osteoarthropathy.    At C3-C4, there is a disc osteophyte complex which partially effaces  the ventral thecal sac. There is no spinal canal stenosis. There is  minimal left neural foraminal narrowing due to uncovertebral  hypertrophy. There is no narrowing of the right neural foramen. There  is no facet osteoarthropathy.    At C4-C5, there is a disc osteophyte complex which partially effaces  the ventral thecal sac. There is no spinal canal stenosis. There is  minimal left neural foraminal narrowing due to uncovertebral  hypertrophy. There is no narrowing of the right neural foramina.  There is no facet osteoarthropathy.    At C5-C6, there is a disc osteophyte complex which partially effaces  the ventral thecal sac. There is no spinal canal stenosis. There is  no neural foraminal narrowing and there is no facet osteoarthropathy.    At C6-C7,  "there is a disc osteophyte complex which partially effaces  the ventral thecal sac. There is no spinal canal stenosis. There is  mild left and moderate right neural foraminal narrowing due to  uncovertebral hypertrophy. There is no facet osteoarthropathy.    At C7-T1, there is a small diffuse disc bulge. There is no spinal  canal stenosis or neural foraminal narrowing. There is no facet  osteoarthropathy.    Impression  Multilevel degenerative changes of the cervical spine without  striking spinal canal stenosis at any level. There are type 1 Modic  changes at the levels of C3-C4 and C6-C7.    This study was interpreted at [facility].      MACRO:  None    Signed by: Orion Reed 3/21/2025 12:41 PM  Dictation workstation:   [id number]     No image results found.       No diagnosis found.     ASSESSMENT/PLAN  Orlin Laguerre \"Michael\" is a 59 y.o. male presenting for cervical interlaminar epidural steroid injection     Patient denies any recent antibiotic use or infections, denies any blood thinner use, and denies contrast or local anesthetic allergies     Risks, benefits, alternatives discussed. All questions answered to the best of my ability. Patient agrees to proceed.      Our plan is as follows:  - Proceed with aforementioned procedure          Lu Adams DO   Pain fellow          [1]   Family History  Problem Relation Name Age of Onset    Other (Heart Problems) Mother      Breast cancer Mother      Diabetes Mother      Heart disease Father      Diabetes Father      Other (Back Problems) Sister      Other (Neck Problems) Sister      Diabetes Sister      Diabetes Sister      Other (Gall Bladder Removed) Mother's Sister       "

## 2025-05-02 NOTE — DISCHARGE INSTRUCTIONS
DISCHARGE INSTRUCTIONS FOR INJECTIONS       After most injections, it is recommended that you relax and limit your activity for the remainder of the day unless you have been told otherwise by your pain physician.  You should not drive a car, operate machinery, or make important legal decisions unless otherwise directed by your pain physician.  You may resume your normal activity, including exercise, tomorrow.      Keep a written pain diary of how much pain relief you experienced following the injection procedure and the length of time of pain relief you experienced pain relief. Following diagnostic injections like medial branch nerve blocks, sacroiliac joint blocks, stellate ganglion injections and other blocks, it is very important you record the specific amount of pain relief you experienced immediately after the injectionand how long it lasted. Your doctor will ask you for this information at your follow up visit.     For all injections, please keep the injection site dry and inspect the site for a couple of days. You may remove the Band-Aid the day of the injection at any time.     Some discomfort, bruising or slight swelling may occur at the injection site. This is not abnormal if it occurs.  If needed you may:    -Take over the counter medication such as Tylenol or Motrin.   -Apply an ice pack for 30 minutes, 2 to 3 times a day for the first 24 hours.     You may shower today; no soaking baths, hot tubs, whirlpools or swimming pools for two days.      If you are given steroids in your injection, it may take 3-5 days for the steroid medication to take effect. You may notice a worsening of your symptoms for 1-2 days after the injection. This is not abnormal.  You may use acetaminophen, ibuprofen, or prescription medication that your doctor may have prescribed for you if you need to do so.     A few common side effects of steroids include facial flushing, sweating, restlessness, irritability,difficulty sleeping,  increase in blood sugar, and increased blood pressure. If you have diabetes, please monitor your blood sugar at least once a day for at least 5 days. If you have poorly controlled high blood pressure, monitoryour blood pressure for at least 2 days and contact your primary care physician if these numbers are unusually high for you.      If you take aspirin or non-steroidal anti-inflammatory drugs (examples are Motrin, Advil, ibuprofen, Naprosyn, Voltaren, Relafen, etc.) you may restart these this evening, but stop taking it 3 days before your next appointment, unless instructed otherwiseby your physician.      You do not need to discontinue non-aspirin-containing pain medications prior to an injection (examples: Celebrex, tramadol, hydrocodone and acetaminophen).      If you take a blood thinning medication (Coumadin, Lovenox, Fragmin,Ticlid, Plavix, Pradaxa, etc.), please discuss this with your primary care physician/cardiologist and your pain physician. These medications MUST be discontinued before you can have an injection safely, without the risk of uncontrolled bleeding. If these medications are not discontinued for an appropriate period of time, you will not be able to receivean injection. Please adhere to instructions given to you about when to restart your blood thinning medication. If you have any questions please reach out to our team.    If you are taking Coumadin, please have your INR checked the morning of your procedure and bring the result to your appointment unless otherwise instructed. If your INR is over 1.2, your injection may need to be rescheduled to avoid uncontrolled bleeding from the needle placement.     Call [facility]  and ask for Pain Management at [phone] between 8am-4pm Monday - Friday if you are experiencing the following:    If you received an epidural or spinal injection:    -Headache that doesnot go away with medicine, is worse when sitting or standing up, and is greatly  relieved upon lying down.   -Severe pain worse than or different than your baseline pain.   -Chills or fever (101º F or greater).   -Drainage or signs of infection at the injection site     Go directly to the Emergency Department if you are experiencing the following and received an epidural or spinal injection:   -Abrupt weakness or progressive weakness in your legs that starts after you leave the clinic.   -Abrupt severe or worsening numbness in your legs.   -Inability to urinate after the injection or loss of bowel or bladder control without the urge to defecate or urinate.     If you have a clinical question that cannot wait until your next appointment, please call [phone] between 8am-4pm Monday - Friday or send a [employer] message. We do our best to return all non-emergency messages within 24 hours, Monday - Friday. A nurse or physician will return your message. You may also try calling and they will do their best to answer your question(s):  - Dr. Matt Kaye's nurse ([phone])      If you need to cancel an appointment, please call the scheduling staff at [phone] during normal business hours or leave a message at least 24 hours in advance.     If you are going to be sedated for your next procedure, you MUST have responsible adult who can legally drive accompany you home. You cannot eat or drink for at least eight hours prior to the planned procedure if you are going to receive sedation. You may take your non-blood thinning medications with a small sip of water.

## 2025-05-04 DIAGNOSIS — E11.9 ALTERED METABOLISM DUE TO DIABETES (MULTI): ICD-10-CM

## 2025-05-04 RX ORDER — INSULIN ASPART 100 [IU]/ML
INJECTION, SOLUTION INTRAVENOUS; SUBCUTANEOUS
Qty: 45 ML | Refills: 3 | Status: SHIPPED | OUTPATIENT
Start: 2025-05-04

## 2025-05-05 ENCOUNTER — APPOINTMENT (OUTPATIENT)
Dept: RADIOLOGY | Facility: HOSPITAL | Age: 60
DRG: 853 | End: 2025-05-05
Payer: MEDICARE

## 2025-05-05 ENCOUNTER — APPOINTMENT (OUTPATIENT)
Dept: CARDIOLOGY | Facility: HOSPITAL | Age: 60
DRG: 853 | End: 2025-05-05
Payer: MEDICARE

## 2025-05-05 ENCOUNTER — HOSPITAL ENCOUNTER (INPATIENT)
Facility: HOSPITAL | Age: 60
LOS: 1 days | Discharge: ANOTHER HOSPITAL | DRG: 853 | End: 2025-05-06
Attending: STUDENT IN AN ORGANIZED HEALTH CARE EDUCATION/TRAINING PROGRAM | Admitting: INTERNAL MEDICINE
Payer: MEDICARE

## 2025-05-05 DIAGNOSIS — N17.9 SEPSIS WITH ACUTE RENAL FAILURE AND SEPTIC SHOCK, DUE TO UNSPECIFIED ORGANISM, UNSPECIFIED ACUTE RENAL FAILURE TYPE (MULTI): ICD-10-CM

## 2025-05-05 DIAGNOSIS — N49.3 FOURNIER GANGRENE IN MALE: ICD-10-CM

## 2025-05-05 DIAGNOSIS — D72.829 LEUKOCYTOSIS, UNSPECIFIED TYPE: ICD-10-CM

## 2025-05-05 DIAGNOSIS — A41.9 SEPSIS WITH ACUTE RENAL FAILURE AND SEPTIC SHOCK, DUE TO UNSPECIFIED ORGANISM, UNSPECIFIED ACUTE RENAL FAILURE TYPE (MULTI): ICD-10-CM

## 2025-05-05 DIAGNOSIS — E87.6 HYPOKALEMIA: ICD-10-CM

## 2025-05-05 DIAGNOSIS — S09.90XA CLOSED HEAD INJURY, INITIAL ENCOUNTER: ICD-10-CM

## 2025-05-05 DIAGNOSIS — N49.3 FOURNIER'S GANGRENE (MULTI): Primary | ICD-10-CM

## 2025-05-05 DIAGNOSIS — R65.21 SEPSIS WITH ACUTE RENAL FAILURE AND SEPTIC SHOCK, DUE TO UNSPECIFIED ORGANISM, UNSPECIFIED ACUTE RENAL FAILURE TYPE (MULTI): ICD-10-CM

## 2025-05-05 DIAGNOSIS — R55 SYNCOPE AND COLLAPSE: ICD-10-CM

## 2025-05-05 DIAGNOSIS — N17.9 ACUTE KIDNEY INJURY: ICD-10-CM

## 2025-05-05 LAB
ALBUMIN SERPL BCP-MCNC: 3.7 G/DL (ref 3.4–5)
ALP SERPL-CCNC: 100 U/L (ref 33–120)
ALT SERPL W P-5'-P-CCNC: 37 U/L (ref 10–52)
ANION GAP SERPL CALCULATED.3IONS-SCNC: 15 MMOL/L (ref 10–20)
APPEARANCE UR: CLEAR
AST SERPL W P-5'-P-CCNC: 28 U/L (ref 9–39)
BACTERIA #/AREA URNS AUTO: ABNORMAL /HPF
BASOPHILS # BLD AUTO: 0.08 X10*3/UL (ref 0–0.1)
BASOPHILS NFR BLD AUTO: 0.4 %
BILIRUB SERPL-MCNC: 0.7 MG/DL (ref 0–1.2)
BILIRUB UR STRIP.AUTO-MCNC: NEGATIVE MG/DL
BUN SERPL-MCNC: 25 MG/DL (ref 6–23)
CALCIUM SERPL-MCNC: 8.5 MG/DL (ref 8.6–10.3)
CARDIAC TROPONIN I PNL SERPL HS: 4 NG/L (ref 0–20)
CARDIAC TROPONIN I PNL SERPL HS: 5 NG/L (ref 0–20)
CHLORIDE SERPL-SCNC: 93 MMOL/L (ref 98–107)
CO2 SERPL-SCNC: 23 MMOL/L (ref 21–32)
COLOR UR: ABNORMAL
CREAT SERPL-MCNC: 1.92 MG/DL (ref 0.5–1.3)
EGFRCR SERPLBLD CKD-EPI 2021: 40 ML/MIN/1.73M*2
EOSINOPHIL # BLD AUTO: 0.19 X10*3/UL (ref 0–0.7)
EOSINOPHIL NFR BLD AUTO: 1 %
ERYTHROCYTE [DISTWIDTH] IN BLOOD BY AUTOMATED COUNT: 12.5 % (ref 11.5–14.5)
GLUCOSE BLD MANUAL STRIP-MCNC: 242 MG/DL (ref 74–99)
GLUCOSE SERPL-MCNC: 247 MG/DL (ref 74–99)
GLUCOSE UR STRIP.AUTO-MCNC: ABNORMAL MG/DL
HCT VFR BLD AUTO: 41.7 % (ref 41–52)
HGB BLD-MCNC: 15.1 G/DL (ref 13.5–17.5)
IMM GRANULOCYTES # BLD AUTO: 0.68 X10*3/UL (ref 0–0.7)
IMM GRANULOCYTES NFR BLD AUTO: 3.5 % (ref 0–0.9)
KETONES UR STRIP.AUTO-MCNC: NEGATIVE MG/DL
LACTATE SERPL-SCNC: 1.2 MMOL/L (ref 0.4–2)
LACTATE SERPL-SCNC: 2.5 MMOL/L (ref 0.4–2)
LEUKOCYTE ESTERASE UR QL STRIP.AUTO: NEGATIVE
LYMPHOCYTES # BLD AUTO: 0.63 X10*3/UL (ref 1.2–4.8)
LYMPHOCYTES NFR BLD AUTO: 3.2 %
MAGNESIUM SERPL-MCNC: 2.06 MG/DL (ref 1.6–2.4)
MCH RBC QN AUTO: 31.1 PG (ref 26–34)
MCHC RBC AUTO-ENTMCNC: 36.2 G/DL (ref 32–36)
MCV RBC AUTO: 86 FL (ref 80–100)
MONOCYTES # BLD AUTO: 0.51 X10*3/UL (ref 0.1–1)
MONOCYTES NFR BLD AUTO: 2.6 %
NEUTROPHILS # BLD AUTO: 17.61 X10*3/UL (ref 1.2–7.7)
NEUTROPHILS NFR BLD AUTO: 89.3 %
NITRITE UR QL STRIP.AUTO: NEGATIVE
NRBC BLD-RTO: 0 /100 WBCS (ref 0–0)
PH UR STRIP.AUTO: 5.5 [PH]
PLATELET # BLD AUTO: 184 X10*3/UL (ref 150–450)
POTASSIUM SERPL-SCNC: 3 MMOL/L (ref 3.5–5.3)
PROT SERPL-MCNC: 6.5 G/DL (ref 6.4–8.2)
PROT UR STRIP.AUTO-MCNC: ABNORMAL MG/DL
RBC # BLD AUTO: 4.86 X10*6/UL (ref 4.5–5.9)
RBC # UR STRIP.AUTO: NEGATIVE MG/DL
RBC #/AREA URNS AUTO: ABNORMAL /HPF
SODIUM SERPL-SCNC: 128 MMOL/L (ref 136–145)
SP GR UR STRIP.AUTO: <1.005
SQUAMOUS #/AREA URNS AUTO: ABNORMAL /HPF
UROBILINOGEN UR STRIP.AUTO-MCNC: NORMAL MG/DL
WBC # BLD AUTO: 19.7 X10*3/UL (ref 4.4–11.3)
WBC #/AREA URNS AUTO: ABNORMAL /HPF

## 2025-05-05 PROCEDURE — 2500000004 HC RX 250 GENERAL PHARMACY W/ HCPCS (ALT 636 FOR OP/ED): Performed by: STUDENT IN AN ORGANIZED HEALTH CARE EDUCATION/TRAINING PROGRAM

## 2025-05-05 PROCEDURE — 36415 COLL VENOUS BLD VENIPUNCTURE: CPT | Performed by: STUDENT IN AN ORGANIZED HEALTH CARE EDUCATION/TRAINING PROGRAM

## 2025-05-05 PROCEDURE — 81001 URINALYSIS AUTO W/SCOPE: CPT | Performed by: STUDENT IN AN ORGANIZED HEALTH CARE EDUCATION/TRAINING PROGRAM

## 2025-05-05 PROCEDURE — 72193 CT PELVIS W/DYE: CPT | Performed by: RADIOLOGY

## 2025-05-05 PROCEDURE — 96366 THER/PROPH/DIAG IV INF ADDON: CPT

## 2025-05-05 PROCEDURE — 99291 CRITICAL CARE FIRST HOUR: CPT | Performed by: STUDENT IN AN ORGANIZED HEALTH CARE EDUCATION/TRAINING PROGRAM

## 2025-05-05 PROCEDURE — 83735 ASSAY OF MAGNESIUM: CPT | Performed by: STUDENT IN AN ORGANIZED HEALTH CARE EDUCATION/TRAINING PROGRAM

## 2025-05-05 PROCEDURE — 87040 BLOOD CULTURE FOR BACTERIA: CPT | Mod: WESLAB | Performed by: STUDENT IN AN ORGANIZED HEALTH CARE EDUCATION/TRAINING PROGRAM

## 2025-05-05 PROCEDURE — 82947 ASSAY GLUCOSE BLOOD QUANT: CPT

## 2025-05-05 PROCEDURE — 2500000005 HC RX 250 GENERAL PHARMACY W/O HCPCS: Performed by: CLINICAL NURSE SPECIALIST

## 2025-05-05 PROCEDURE — 93005 ELECTROCARDIOGRAM TRACING: CPT

## 2025-05-05 PROCEDURE — 2550000001 HC RX 255 CONTRASTS: Performed by: STUDENT IN AN ORGANIZED HEALTH CARE EDUCATION/TRAINING PROGRAM

## 2025-05-05 PROCEDURE — 96365 THER/PROPH/DIAG IV INF INIT: CPT

## 2025-05-05 PROCEDURE — 71045 X-RAY EXAM CHEST 1 VIEW: CPT | Performed by: RADIOLOGY

## 2025-05-05 PROCEDURE — 72193 CT PELVIS W/DYE: CPT

## 2025-05-05 PROCEDURE — 72125 CT NECK SPINE W/O DYE: CPT

## 2025-05-05 PROCEDURE — 84484 ASSAY OF TROPONIN QUANT: CPT | Performed by: STUDENT IN AN ORGANIZED HEALTH CARE EDUCATION/TRAINING PROGRAM

## 2025-05-05 PROCEDURE — 2500000004 HC RX 250 GENERAL PHARMACY W/ HCPCS (ALT 636 FOR OP/ED): Mod: JZ | Performed by: CLINICAL NURSE SPECIALIST

## 2025-05-05 PROCEDURE — 2020000001 HC ICU ROOM DAILY

## 2025-05-05 PROCEDURE — 99222 1ST HOSP IP/OBS MODERATE 55: CPT | Performed by: STUDENT IN AN ORGANIZED HEALTH CARE EDUCATION/TRAINING PROGRAM

## 2025-05-05 PROCEDURE — 99292 CRITICAL CARE ADDL 30 MIN: CPT | Mod: 25

## 2025-05-05 PROCEDURE — 99292 CRITICAL CARE ADDL 30 MIN: CPT | Performed by: CLINICAL NURSE SPECIALIST

## 2025-05-05 PROCEDURE — 85025 COMPLETE CBC W/AUTO DIFF WBC: CPT | Performed by: STUDENT IN AN ORGANIZED HEALTH CARE EDUCATION/TRAINING PROGRAM

## 2025-05-05 PROCEDURE — 87086 URINE CULTURE/COLONY COUNT: CPT | Mod: WESLAB | Performed by: STUDENT IN AN ORGANIZED HEALTH CARE EDUCATION/TRAINING PROGRAM

## 2025-05-05 PROCEDURE — 3E033XZ INTRODUCTION OF VASOPRESSOR INTO PERIPHERAL VEIN, PERCUTANEOUS APPROACH: ICD-10-PCS | Performed by: STUDENT IN AN ORGANIZED HEALTH CARE EDUCATION/TRAINING PROGRAM

## 2025-05-05 PROCEDURE — 0JBB0ZZ EXCISION OF PERINEUM SUBCUTANEOUS TISSUE AND FASCIA, OPEN APPROACH: ICD-10-PCS | Performed by: STUDENT IN AN ORGANIZED HEALTH CARE EDUCATION/TRAINING PROGRAM

## 2025-05-05 PROCEDURE — 80053 COMPREHEN METABOLIC PANEL: CPT | Performed by: STUDENT IN AN ORGANIZED HEALTH CARE EDUCATION/TRAINING PROGRAM

## 2025-05-05 PROCEDURE — 70450 CT HEAD/BRAIN W/O DYE: CPT

## 2025-05-05 PROCEDURE — 96367 TX/PROPH/DG ADDL SEQ IV INF: CPT

## 2025-05-05 PROCEDURE — 71045 X-RAY EXAM CHEST 1 VIEW: CPT

## 2025-05-05 PROCEDURE — 83605 ASSAY OF LACTIC ACID: CPT | Performed by: STUDENT IN AN ORGANIZED HEALTH CARE EDUCATION/TRAINING PROGRAM

## 2025-05-05 PROCEDURE — 96374 THER/PROPH/DIAG INJ IV PUSH: CPT | Mod: 59

## 2025-05-05 PROCEDURE — 96361 HYDRATE IV INFUSION ADD-ON: CPT

## 2025-05-05 RX ORDER — VANCOMYCIN 2 G/400ML
2 INJECTION, SOLUTION INTRAVENOUS ONCE
Status: COMPLETED | OUTPATIENT
Start: 2025-05-05 | End: 2025-05-05

## 2025-05-05 RX ORDER — POTASSIUM CHLORIDE 14.9 MG/ML
20 INJECTION INTRAVENOUS
Status: COMPLETED | OUTPATIENT
Start: 2025-05-05 | End: 2025-05-06

## 2025-05-05 RX ORDER — NOREPINEPHRINE BITARTRATE/D5W 8 MG/250ML
0-.2 PLASTIC BAG, INJECTION (ML) INTRAVENOUS CONTINUOUS
Status: DISCONTINUED | OUTPATIENT
Start: 2025-05-05 | End: 2025-05-06

## 2025-05-05 RX ORDER — CLINDAMYCIN PHOSPHATE 900 MG/50ML
900 INJECTION, SOLUTION INTRAVENOUS ONCE
Status: COMPLETED | OUTPATIENT
Start: 2025-05-05 | End: 2025-05-05

## 2025-05-05 RX ADMIN — SODIUM CHLORIDE 1000 ML: 9 INJECTION, SOLUTION INTRAVENOUS at 17:15

## 2025-05-05 RX ADMIN — POTASSIUM CHLORIDE 20 MEQ: 14.9 INJECTION, SOLUTION INTRAVENOUS at 21:45

## 2025-05-05 RX ADMIN — VANCOMYCIN 2 G: 2 INJECTION, SOLUTION INTRAVENOUS at 16:37

## 2025-05-05 RX ADMIN — SODIUM CHLORIDE 2586 ML: 9 INJECTION, SOLUTION INTRAVENOUS at 15:57

## 2025-05-05 RX ADMIN — SODIUM CHLORIDE 1000 ML: 900 INJECTION, SOLUTION INTRAVENOUS at 15:45

## 2025-05-05 RX ADMIN — NOREPINEPHRINE BITARTRATE 0.01 MCG/KG/MIN: 8 INJECTION, SOLUTION INTRAVENOUS at 18:13

## 2025-05-05 RX ADMIN — CLINDAMYCIN PHOSPHATE 900 MG: 900 INJECTION, SOLUTION INTRAVENOUS at 18:46

## 2025-05-05 RX ADMIN — POTASSIUM CHLORIDE 20 MEQ: 14.9 INJECTION, SOLUTION INTRAVENOUS at 17:55

## 2025-05-05 RX ADMIN — PIPERACILLIN SODIUM AND TAZOBACTAM SODIUM 4.5 G: 4; .5 INJECTION, SOLUTION INTRAVENOUS at 15:57

## 2025-05-05 RX ADMIN — IOHEXOL 75 ML: 350 INJECTION, SOLUTION INTRAVENOUS at 17:20

## 2025-05-05 ASSESSMENT — LIFESTYLE VARIABLES
HAVE YOU EVER FELT YOU SHOULD CUT DOWN ON YOUR DRINKING: NO
TOTAL SCORE: 0
EVER HAD A DRINK FIRST THING IN THE MORNING TO STEADY YOUR NERVES TO GET RID OF A HANGOVER: NO
EVER FELT BAD OR GUILTY ABOUT YOUR DRINKING: NO
HAVE PEOPLE ANNOYED YOU BY CRITICIZING YOUR DRINKING: NO

## 2025-05-05 ASSESSMENT — PAIN SCALES - GENERAL
PAINLEVEL_OUTOF10: 0 - NO PAIN
PAINLEVEL_OUTOF10: 0 - NO PAIN

## 2025-05-05 ASSESSMENT — PAIN DESCRIPTION - PROGRESSION: CLINICAL_PROGRESSION: NOT CHANGED

## 2025-05-05 ASSESSMENT — PAIN - FUNCTIONAL ASSESSMENT: PAIN_FUNCTIONAL_ASSESSMENT: 0-10

## 2025-05-05 NOTE — SIGNIFICANT EVENT
[facility] CRITICAL CARE SIGNIFICANT EVENT NOTE    Date:  5/5/2025  Patient:  Orlin Laguerre  YOB: 1965  MRN:  [id number]   Admit Date:  5/5/2025    Michael Laguerre is a 59-year-old male with past medical history significant for diabetes mellitus, chronic diastolic heart failure, BPH, and HTN who presented to [facility] emergency department 5/5 after a syncopal event.  He states he was out in public today and became lightheaded which preceded the syncopal episode.  He states that on Friday, he noticed a large firm, tender abscess in his right perineum that he drained with a needle.  He states that a large amount of malodorous pus drained from the abscess.  Upon arrival to the emergency department, he was found to be hypotensive and tachycardic with a significant leukocytosis.  He received broad spectrum antibiotics, multiple liters of IV fluids, but remained hypotensive, and was subsequently started on Levophed.  CT pelvis showed gas-forming infection in the right perineum, concerning for necrotizing fasciitis.  Urology was consulted by the ED team, to which it was recommended that patient go to OR tonight for drainage of abscess.  Unfortunately, due to unforseen issues, this facility does not have the capacity for the patient to be taken to the operating room this evening.    Of note in 2017, he presented with necrotizing fasciitis of the left perineum which ultimately required diverting colostomy, wound vac placement, and discharge to rehab for long term antibiotic therapy.  Colostomy had been reversed later that year.  Patient stated this was a very long, exhausting, and difficult recovery for him.     ICU team was called to evaluate the patient for admission to the intensive care unit for treatment of septic shock in the andreina-operative period.  On exam, patient has a gangrenous, erythematous wound to right perineum.  Levophed requirements have been increasing.  Due to worsening shock state, intensive  care team discussed recommendations with emergency medicine and urology who agreed with initiating transfer to expedite definitive management of patient's gangrenous/necrotizing abscess as this facility is currently unable to accommodate operative management at this time.     Jamie Gallegos PA-C  Pulmonary and Critical Care Medicine   [facility]

## 2025-05-05 NOTE — ED PROVIDER NOTES
Department of Emergency Medicine   ED  Provider Note  Admit Date/RoomTime: 5/5/2025  3:28 PM  ED Room: TR02/TR02        History of Present Illness:  Chief Complaint   Patient presents with    Syncope         Orlin Laguerre is a 59 y.o. male hyperlipidemia prior, Samm's gangrene CHF, BPH, depression, diabetes, presents to the emergency department syncopal episode.  [place] and became very lightheaded and face planted onto the ground.  Reports he had bleeding from his left side of his nose.  Patient ambulated to the emergency department.  Reports he felt like his blood pressure was low this morning 68/48.  He has felt lightheaded today.  On his scrotum that appeared about 3 days ago.  Punctured the boil area with a large amount of thick green drainage.  Denies any fever or chills.  No cough congestion runny nose sore throat no abdominal pain no nausea no vomiting but reports a decrease in appetite.    Review of Systems:   Pertinent positives and negatives are stated within HPI, all other systems reviewed and are negative.        --------------------------------------------- PAST HISTORY ---------------------------------------------  Past Medical History:  has a past medical history of Acute pulmonary edema (08/24/2023), Altered metabolism due to diabetes (Multi), Angina pectoris (08/24/2023), Cervicalgia, Coronary artery disease (08/24/2023), Diabetes (Multi), 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 (11/04/2015), Essential hypertension (08/24/2023), Foreign body in cornea, left eye, initial encounter (11/04/2015), Localized traumatic opacities, right eye (10/26/2015), Mixed hyperlipidemia (08/24/2023), Other conditions influencing health status, Other conditions influencing health status, Pain in unspecified hip, Palpitations (08/24/2023), Peripheral vascular disease (CMS-HCC) (08/24/2023), Personal history of other diseases of the circulatory system, Personal history of other  diseases of the digestive system, Personal history of other diseases of the digestive system, Personal history of other diseases of the musculoskeletal system and connective tissue, Personal history of other diseases of the nervous system and sense organs, Personal history of other diseases of the nervous system and sense organs, Personal history of other diseases of the nervous system and sense organs, Personal history of other diseases of the respiratory system, Personal history of other mental and behavioral disorders, Shortness of breath (01/16/2024), Systolic heart failure (08/22/2022), Unspecified blepharitis left eye, unspecified eyelid (10/14/2015), Unspecified blepharitis left lower eyelid (10/14/2015), Unspecified blepharitis left lower eyelid (10/14/2015), Unspecified blepharitis left upper eyelid (10/14/2015), Unspecified blepharitis left upper eyelid (10/14/2015), Unspecified blepharitis right lower eyelid (10/14/2015), Unspecified blepharitis right lower eyelid (10/14/2015), Unspecified blepharitis right upper eyelid (10/14/2015), Unspecified blepharitis right upper eyelid (10/14/2015), Unspecified injury of left eye and orbit, initial encounter (11/04/2015), and Vasovagal syncope (01/16/2024).  Past Surgical History:  has a past surgical history that includes CT guided chest tube placement (12/21/2017) and CT guided percutaneous peritoneal or retroperitoneal fluid collection drainage (12/21/2017).  Social History:  reports that he has been smoking cigarettes. He started smoking about 46 years ago. He has a 91.3 pack-year smoking history. He has never been exposed to tobacco smoke. He has never used smokeless tobacco. He reports current alcohol use. He reports current drug use. Drug: Marijuana.  Family History: family history includes Back Problems in his sister; Breast cancer in his mother; Diabetes in his father, mother, sister, and sister; Gall Bladder Removed in his mother's sister; Heart Problems  in his mother; Heart disease in his father; Neck Problems in his sister.. Unless otherwise noted, family history is non contributory  The patient’s home medications have been reviewed.  Allergies: Varenicline, Metformin hcl, and Tramadol        ---------------------------------------------------PHYSICAL EXAM--------------------------------------    GENERAL APPEARANCE: Awake and alert.   VITAL SIGNS: As per the nurses' triage record.  Hypotensive tachycardic  HEENT: Normocephalic, atraumatic.  No raccoon eyes or mackay signs no epistaxis noted no bite to the tongue or lip.  No contusions abrasions lacerations noted to the face or scalp.  No bite to the tongue or lip.  Teeth appear to be at baseline.  Extraocular muscles are intact. Pupils equal round and reactive to light. Conjunctiva are pink. Negative scleral icterus. Mucous membranes are moist. Tongue in the midline. Pharynx was without erythema or exudates, uvula midline  NECK: No pain palpation of cervical spine no step-offs crepitus or bruising soft Nontender and supple, full gross ROM, no meningeal signs.  CHEST: Nontender to palpation.  No flail chest clear to auscultation bilaterally. No rales, rhonchi, or wheezing.   HEART: S1, S2.  Tachycardic regular rate and rhythm. No murmurs, gallops or rubs.  Strong and equal pulses in the extremities.   ABDOMEN: Soft, nontender, nondistended, positive bowel sounds, no palpable masses.  : Chaperone present.  Patient has edema to the scrotum erythema a 1 cm area of necrosis, tissue around the palpable.  Tenderness to palpation between the scrotum and anus.  Edema noted  MUSCULCSKELETAL: The calves are nontender to palpation. Full gross active range of motion.  Peripheral pulses are faint but intact.  No peripheral edema moves all extremities no pain palpation of the pelvis.  Back: No pain palpation of thoracic, spine no step-offs crepitus or bruising no saddle paresthesias  NEUROLOGICAL: Awake, alert and oriented x  "3. Power intact in the upper and lower extremities. Sensation is intact to light touch in the upper and lower extremities.   IMMUNOLOGICAL: No lymphatic streaking noted   DERM: No petechiae or ecchymoses.          ------------------------- NURSING NOTES AND VITALS REVIEWED ---------------------------  The nursing notes within the ED encounter and vital signs as below have been reviewed by myself  /59   Pulse (!) 102   Temp 36.3 °C (97.3 °F) (Temporal)   Resp 16   Ht 1.803 m (5' 11\")   Wt 86.2 kg (190 lb)   SpO2 100%   BMI 26.50 kg/m²     Oxygen Saturation Interpretation: 100% room air    The cardiac monitor revealed sinus tachycardia with a heart rate in the 100s as interpreted by me. The cardiac monitor was ordered secondary to the patient's heart rate and to monitor the patient for dysrhythmia.       The patient’s available past medical records and past encounters were reviewed.          -----------------------DIAGNOSTIC RESULTS------------------------  LABS:    Labs Reviewed   CBC WITH AUTO DIFFERENTIAL - Abnormal       Result Value    WBC 19.7 (*)     nRBC 0.0      RBC 4.86      Hemoglobin 15.1      Hematocrit 41.7      MCV 86      MCH 31.1      MCHC 36.2 (*)     RDW 12.5      Platelets 184      Neutrophils % 89.3      Immature Granulocytes %, Automated 3.5 (*)     Lymphocytes % 3.2      Monocytes % 2.6      Eosinophils % 1.0      Basophils % 0.4      Neutrophils Absolute 17.61 (*)     Immature Granulocytes Absolute, Automated 0.68      Lymphocytes Absolute 0.63 (*)     Monocytes Absolute 0.51      Eosinophils Absolute 0.19      Basophils Absolute 0.08     COMPREHENSIVE METABOLIC PANEL - Abnormal    Glucose 247 (*)     Sodium 128 (*)     Potassium 3.0 (*)     Chloride 93 (*)     Bicarbonate 23      Anion Gap 15      Urea Nitrogen 25 (*)     Creatinine 1.92 (*)     eGFR 40 (*)     Calcium 8.5 (*)     Albumin 3.7      Alkaline Phosphatase 100      Total Protein 6.5      AST 28      Bilirubin, Total " 0.7      ALT 37     LACTATE - Abnormal    Lactate 2.5 (*)     Narrative:     Venipuncture immediately after or during the administration of Metamizole may lead to falsely low results. Testing should be performed immediately prior to Metamizole dosing.   URINALYSIS WITH REFLEX CULTURE AND MICROSCOPIC - Abnormal    Color, Urine Light-Yellow      Appearance, Urine Clear      Specific Gravity, Urine <1.005 (*)     pH, Urine 5.5      Protein, Urine 10 (TRACE)      Glucose, Urine OVER (4+) (*)     Blood, Urine NEGATIVE      Ketones, Urine NEGATIVE      Bilirubin, Urine NEGATIVE      Urobilinogen, Urine Normal      Nitrite, Urine NEGATIVE      Leukocyte Esterase, Urine NEGATIVE      Narrative:     OVER is reported when the result is greater than the clinically reportable range.   POCT GLUCOSE - Abnormal    POCT Glucose 242 (*)    URINALYSIS MICROSCOPIC WITH REFLEX CULTURE - Abnormal    WBC, Urine 11-20 (*)     RBC, Urine NONE      Squamous Epithelial Cells, Urine 1-9 (SPARSE)      Bacteria, Urine 1+ (*)    BLOOD CULTURE - Normal    Blood Culture Loaded on Instrument - Culture in progress     BLOOD CULTURE - Normal    Blood Culture Loaded on Instrument - Culture in progress     MAGNESIUM - Normal    Magnesium 2.06     SERIAL TROPONIN-INITIAL - Normal    Troponin I, High Sensitivity 5      Narrative:     Less than 99th percentile of normal range cutoff-  Female and children under 18 years old <14 ng/L; Male <21 ng/L: Negative  Repeat testing should be performed if clinically indicated.     Female and children under 18 years old 14-50 ng/L; Male 21-50 ng/L:  Consistent with possible cardiac damage and possible increased clinical   risk. Serial measurements may help to assess extent of myocardial damage.     >50 ng/L: Consistent with cardiac damage, increased clinical risk and  myocardial infarction. Serial measurements may help assess extent of   myocardial damage.      NOTE: Children less than 1 year old may have higher  baseline troponin   levels and results should be interpreted in conjunction with the overall   clinical context.     NOTE: Troponin I testing is performed using a different   testing methodology at [facility] than at other   [facility]. Direct result comparisons should only   be made within the same method.   SERIAL TROPONIN, 1 HOUR - Normal    Troponin I, High Sensitivity 4      Narrative:     Less than 99th percentile of normal range cutoff-  Female and children under 18 years old <14 ng/L; Male <21 ng/L: Negative  Repeat testing should be performed if clinically indicated.     Female and children under 18 years old 14-50 ng/L; Male 21-50 ng/L:  Consistent with possible cardiac damage and possible increased clinical   risk. Serial measurements may help to assess extent of myocardial damage.     >50 ng/L: Consistent with cardiac damage, increased clinical risk and  myocardial infarction. Serial measurements may help assess extent of   myocardial damage.      NOTE: Children less than 1 year old may have higher baseline troponin   levels and results should be interpreted in conjunction with the overall   clinical context.     NOTE: Troponin I testing is performed using a different   testing methodology at [facility] than at other   [facility]. Direct result comparisons should only   be made within the same method.   LACTATE - Normal    Lactate 1.2      Narrative:     Venipuncture immediately after or during the administration of Metamizole may lead to falsely low results. Testing should be performed immediately prior to Metamizole dosing.   URINE CULTURE   TROPONIN SERIES- (INITIAL, 1 HR)    Narrative:     The following orders were created for panel order Troponin I Series, High Sensitivity (0, 1 HR).  Procedure                               Abnormality         Status                     ---------                               -----------         ------                      Troponin I, High Sensiti...[[id number]]  Normal              Final result               Troponin, High Sensitivi...[[id number]]  Normal              Final result                 Please view results for these tests on the individual orders.   URINALYSIS WITH REFLEX CULTURE AND MICROSCOPIC    Narrative:     The following orders were created for panel order Urinalysis with Reflex Culture and Microscopic.  Procedure                               Abnormality         Status                     ---------                               -----------         ------                     Urinalysis with Reflex C...[[id number]]  Abnormal            Final result               Extra Urine Gray Tube[[id number]]                            In process                   Please view results for these tests on the individual orders.   EXTRA URINE GRAY TUBE   POCT GLUCOSE METER       As interpreted by me, the above displayed labs are abnormal. All other labs obtained during this visit were within normal range or not returned as of this dictation.      EKG Interpretation    1537 EKG on my interpretation shows sinus tachycardia, rate of 104 bpm.  Normal axis.  QTc is 444 ms, NM interval 168.  There are some baseline artifact from patient motion, otherwise no apparent ST elevation or depression, no acute ischemic pattern.  No STEMI. [NT]           CT pelvis w IV contrast   Final Result   1.  Gas-forming infection right perineum. Correlate with necrotizing   fasciitis.        MACRO:   None        Signed by: Christiano Young 5/5/2025 5:59 PM   Dictation workstation:   [id number]      CT head wo IV contrast   Final Result   No acute intracranial findings        No acute cervical spine findings.        Signed by: Manny Childs 5/5/2025 5:50 PM   Dictation workstation:   [id number]      CT cervical spine wo IV contrast   Final Result   No acute intracranial findings        No acute cervical spine findings.        Signed by: Manny Childs  5/5/2025 5:50 PM   Dictation workstation:   [id number]      XR chest 1 view   Final Result   1.  No evidence of acute cardiopulmonary process.                  MACRO:   None        Signed by: Marcos Ceja 5/5/2025 4:34 PM   Dictation workstation:   [id number]              CT pelvis w IV contrast   Final Result   1.  Gas-forming infection right perineum. Correlate with necrotizing   fasciitis.        MACRO:   None        Signed by: Christiano Young 5/5/2025 5:59 PM   Dictation workstation:   [id number]      CT head wo IV contrast   Final Result   No acute intracranial findings        No acute cervical spine findings.        Signed by: Manny Childs 5/5/2025 5:50 PM   Dictation workstation:   [id number]      CT cervical spine wo IV contrast   Final Result   No acute intracranial findings        No acute cervical spine findings.        Signed by: Manny Childs 5/5/2025 5:50 PM   Dictation workstation:   [id number]      XR chest 1 view   Final Result   1.  No evidence of acute cardiopulmonary process.                  MACRO:   None        Signed by: Marcos Ceja 5/5/2025 4:34 PM   Dictation workstation:   [id number]              ------------------------------ ED COURSE/MEDICAL DECISION MAKING----------------------  Medical Decision Making:   Exam: A medically appropriate exam performed, outlined above, given the known history and presentation.    History obtained from: Review of medical record nursing notes patient      Social Determinants of Health considered during this visit: Takes care of himself at home      PAST MEDICAL HISTORY/Chronic Conditions Affecting Care     has a past medical history of Acute pulmonary edema (08/24/2023), Altered metabolism due to diabetes (Multi), Angina pectoris (08/24/2023), Cervicalgia, Coronary artery disease (08/24/2023), Diabetes (Multi), 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 (11/04/2015), Essential hypertension (08/24/2023), Foreign body in cornea, left eye, initial  encounter (11/04/2015), Localized traumatic opacities, right eye (10/26/2015), Mixed hyperlipidemia (08/24/2023), Other conditions influencing health status, Other conditions influencing health status, Pain in unspecified hip, Palpitations (08/24/2023), Peripheral vascular disease (CMS-HCC) (08/24/2023), Personal history of other diseases of the circulatory system, Personal history of other diseases of the digestive system, Personal history of other diseases of the digestive system, Personal history of other diseases of the musculoskeletal system and connective tissue, Personal history of other diseases of the nervous system and sense organs, Personal history of other diseases of the nervous system and sense organs, Personal history of other diseases of the nervous system and sense organs, Personal history of other diseases of the respiratory system, Personal history of other mental and behavioral disorders, Shortness of breath (01/16/2024), Systolic heart failure (08/22/2022), Unspecified blepharitis left eye, unspecified eyelid (10/14/2015), Unspecified blepharitis left lower eyelid (10/14/2015), Unspecified blepharitis left lower eyelid (10/14/2015), Unspecified blepharitis left upper eyelid (10/14/2015), Unspecified blepharitis left upper eyelid (10/14/2015), Unspecified blepharitis right lower eyelid (10/14/2015), Unspecified blepharitis right lower eyelid (10/14/2015), Unspecified blepharitis right upper eyelid (10/14/2015), Unspecified blepharitis right upper eyelid (10/14/2015), Unspecified injury of left eye and orbit, initial encounter (11/04/2015), and Vasovagal syncope (01/16/2024).       CC/HPI Summary, Social Determinants of health, Records Reviewed, DDx, testing done/not done, ED Course, Reassessment, disposition considerations/shared decision making with patient, consults, disposition:   Boil to the scrotum, syncope  Patient found to be hypotensive tachycardic concerning for sepsis with source of  infection and boil to the scrotum.  Sepsis triggered broad-spectrum antibiotics ordered  Plan  Chest x-ray, EKG, CBC, CMP, magnesium, troponin, Zosyn, vancomycin, normal saline 30 mg/kg, blood culture, lactate, urine, CT cervical, CT head, CT pelvis  Medical Decision Making/Differential Diagnosis:  Differentials include not limited to sepsis versus Samm's gangrene versus dehydration versus electrolyte abnormality versus anemia versus DKA versus acute coronary syndrome versus arrhythmia  Review  Lactate 2.5 repeat lactate 1.2 after fluids  Troponins within normal limits no complaints of chest pain  Glucose 247 no signs of DKA  Sodium low 128 corrected sodium 132  Potassium 3 replaced  BUN/creatinine elevated from baseline  Calcium 8.5  Chloride 93  LFTs unremarkable  Urine showed glucose, WBCs 11-20, +1 bacteria  White blood cell count 19.7  Hemoglobin 15.1  Patient presents to the emergency department complaints of syncope.  CT of the head / neck showed No acute intracranial findings  No acute cervical spine findings.  Chest x-ray showed 1. No evidence of acute cardiopulmonary process is alert and oriented no signs of concussion syndrome.  Neurologically intact.  NIH 0.  Test of skew negative Van negative patient found to be hypotensive tachycardic sepsis initiated scrotal abscess concern for Samm's gangrene IV antibiotics ordered clindamycin vancomycin Zosyn 30 mg/kg's IV fluid cultures pending CT of pelvis showed 1.  Gas-forming infection right perineum. Correlate with necrotizing  fasciitis.  Consultation to general surgery at urology due to patient's urgency of CAT scan findings leukocytosis lactic acidosis concerning for sepsis recommended transfer to tertiary facility where urology and general surgery available with OR room available awaiting transfer patient in the emergency department for several hours we consulted the ICU, urology team, general surgery team has agreed to admit for further evaluation  "and treatment patient was given second dose of antibiotics.  Lactate improved with IV fluids.  Monitor closely.  After fluid bolus blood pressure remains low placed on IV Levophed with blood pressure improvement.  Cultures pending.  Potassium found to be low replaced.  Sodium level corrected sodium 132 acute kidney injury from patient's baseline.  LFTs unremarkable.  Calcium slightly low.  Glucose elevated no signs of DKA patient n.p.o. with concerns need for surgery.  Urine showed glucose +1 bacteria WBCs 11-20 no urinary symptoms patient is not anemic EKG per attending note Sinus tachycardia cardiac enzymes within normal limits.  No complaints of chest pain  Impression  Sepsis with septic shock  Foreigners gangrene  Leukocytosis  Acute kidney injury  Hypokalemia  Syncope and collapse  Closed head injury  Patient seen and evaluated with attending physician Dr. Griffith         PROCEDURES  Unless otherwise noted below, none      CONSULTS:   IP CONSULT TO UROLOGY  Dr. Sousa  Consultation general surgery  Dr. Licona  Consultation urology [facility]   Consultation ICU team    ED Course as of 05/06/25 0243   Mon May 05, 2025   1537 EKG on my interpretation shows sinus tachycardia, rate of 104 bpm.  Normal axis.  QTc is 444 ms, ID interval 168.  There are some baseline artifact from patient motion, otherwise no apparent ST elevation or depression, no acute ischemic pattern.  No STEMI. [NT]   1551 I assessed the patient on arrival to the room in the ED, he is hypotensive, mildly tachycardic.  He has a \"boil\" at the right groin area in the perineum that he states he popped on Friday.  He first noticed it on Friday.  The area appears infected with some necrosis of the tissue around the lesion.  He was started on broad-spectrum antibiotics, sepsis workup was ordered, 30 cc/KG bolus ordered for a total of 2586 mL of fluid with presumed septic shock as the cause of the patient's syncope today. [NT]   1626 " WBC(!): 19.7  Sepsis protocol inplace [TB]   1639 Patient has received half the IV fluid bolus.  Manual blood pressure 68/40 pressure bag fluids.  Patient is mentating appropriately.  He is on a blood pressure medication of atenolol /chlorthalidone last dose of medication last night.  Patient reports this medication was recently adjusted by his primary care physician due to hypotension [TB]   1704 Blood pressure 88/50 additional IV fluids ordered.  If no improvement after IV fluids will start Levophed [TB]   1740 Creatinine(!): 1.92  Received IV fluids [TB]   1740 POTASSIUM(!): 3.0  Replaced [TB]   1741 SODIUM(!): 128  Corrected sodium 132 for blood sugar [TB]   1807 CT pelvis w IV contrast  1.  Gas-forming infection right perineum. Correlate with necrotizing  fasciitis.  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  Attending physician spoke with Dr. Jean advised admit to [facility] plan for OR tonight [TB]   1908 Clarified with the general surgery team patient is not a general surgery admit.  Dr. Jean called back advised to admit to medicine service due to OR room not available with plans OR treatment tomorrow.  Antibiotic therapy through medicine team [TB]   1921 Via epic chat patient excepted to the ICU Dr. Wallace  [TB]   2123 After evaluation by ICU team conversation with urology and surgery recommended tertiary treatment center.  Awaiting transfer [TB]   Tue May 06, 2025   0044 Still waiting acceptance at [facility] patient is adamant that he does not want to go to [facility] to be admitted.  He would like the surgery performed here.  This was discussed with the surgical team recommends tertiary location where urology available to perform urgent surgery.  Discussed this with patient.  Patient would like to sign out AGAINST MEDICAL ADVICE discussed with patient in great  detail had a long conversation including risk of death loss of ability to perform activities of daily living organ damage and verbalizes understanding.  After discussing with patient he is amenable to stay awaiting [TB]   0051 After thinking about risk and benefits staying in the hospital to be admitted and transferred patient request to go to [facility] a request was placed awaiting response from [facility] notified patient that he did not have any beds available at this time he offered to send him to another [facility] facility and he has declined reports if he cannot go to [facility] or stay here he was signed out AGAINST MEDICAL ADVICE he is worried about his car states he will monitor his blood pressure at home and return with any worsening symptoms or concerns or come back tomorrow [TB]   0114 After review patient given second round of antibiotics discussion with pharmacy to clarify dosing  [TB]   0130 Discussed case with urology team at [facility]  advised he needs to talk to someone in the SICU before excepting the patient will notify after consultation with the SICU team [TB]   0204 Patient declined by [facility].  Advised transfer to the clinic main patient has refused.  Is going to leave AGAINST MEDICAL ADVICE.  Risk given reviewed with him in great detail multiple times.  Discussed case with the admitting team, ICU attending physician covering patient has now been excepted to the ICU [facility] for further evaluation and treatment with the consulting teams.  Patient amenable plan amenable to staying at [facility] for further evaluation and treatment he has been given a second round of antibiotics [TB]   0231 I performed a sepsis reperfusion exam on Orlin Laguerre on 5/6/2025 2:31 AM    A targeted fluid bolus of 3586 ml was given.  Cap refill less than 2 sensation intact.  Blood pressure improved patient did require  pressor support.     MALLIKA Walsh  [TB]      ED Course User Index  [NT] Wesley Griffith DO  [TB] MALLIKA Walsh         Diagnoses as of 05/06/25 0243   Hypokalemia   Acute kidney injury   Samm's gangrene (Multi)   Closed head injury, initial encounter   Syncope and collapse   Leukocytosis, unspecified type   Sepsis with acute renal failure and septic shock, due to unspecified organism, unspecified acute renal failure type (Multi)         This patient has remained hemodynamically stable during their ED course.      Critical Care: 75    Critical Care    Performed by: MALLIKA Walsh  Authorized by: Wesley Griffith DO    Critical care provider statement:     Critical care time (minutes):  75    Critical care time was exclusive of:  Separately billable procedures and treating other patients and teaching time    Critical care was necessary to treat or prevent imminent or life-threatening deterioration of the following conditions:  Sepsis, shock, metabolic crisis and renal failure    Critical care was time spent personally by me on the following activities:  Blood draw for specimens, development of treatment plan with patient or surrogate, discussions with consultants, evaluation of patient's response to treatment, examination of patient, interpretation of cardiac output measurements, obtaining history from patient or surrogate, ordering and performing treatments and interventions, ordering and review of laboratory studies, ordering and review of radiographic studies, pulse oximetry, re-evaluation of patient's condition and review of old charts    Care discussed with: admitting provider    Care time secondary to sepsis with septic shock requiring close monitoring IV antibiotics IV fluids IV pressors, advanced testing consultation to specialty teams      Counseling:  The emergency provider has spoken with the patient and discussed today’s results, in addition to providing  specific details for the plan of care and counseling regarding the diagnosis and prognosis.  Questions are answered at this time and they are agreeable with the plan.         --------------------------------- IMPRESSION AND DISPOSITION ---------------------------------    IMPRESSION  1. Samm's gangrene (Multi)    2. Hypokalemia    3. 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    4. Acute kidney injury    5. Closed head injury, initial encounter    6. Syncope and collapse    7. Leukocytosis, unspecified type    8. Sepsis with acute renal failure and septic shock, due to unspecified organism, unspecified acute renal failure type (Multi)        DISPOSITION  Disposition: Admit ICU  Patient condition is critical        NOTE: This report was transcribed using voice recognition software. Every effort was made to ensure accuracy; however, inadvertent computerized transcription errors may be present      YAIMA Walsh-CNP  05/06/25 0243

## 2025-05-05 NOTE — ED TRIAGE NOTES
"Ambulated into ER with c/o \"[place], [place] my face. I got steroid injections on Friday and have felt like crap since\" per pt. Nosebleed after fall, resolved. No visible injuries. Pt stated \"Has not eaten in 3 days, no appetite\".   "

## 2025-05-05 NOTE — PROGRESS NOTES
Sepsis Alert @ 1549    -Patient presents to ED with syncope  -, RR 22, BP 72/48  -WBC 19.7, Lactate 2.5    -IV Zosyn 4.5 gm given @ 1557  -IV Vancomycin 2 gm given @ 1637  -IV NS 2,586 mls (30 mls/kg) given @ 1557    Deanna Marti, PharmD

## 2025-05-06 ENCOUNTER — ANESTHESIA (OUTPATIENT)
Dept: OPERATING ROOM | Facility: HOSPITAL | Age: 60
End: 2025-05-06

## 2025-05-06 ENCOUNTER — ANESTHESIA EVENT (OUTPATIENT)
Dept: OPERATING ROOM | Facility: HOSPITAL | Age: 60
End: 2025-05-06

## 2025-05-06 ENCOUNTER — ANESTHESIA EVENT (OUTPATIENT)
Dept: OPERATING ROOM | Facility: HOSPITAL | Age: 60
DRG: 853 | End: 2025-05-06
Payer: MEDICARE

## 2025-05-06 ENCOUNTER — ANESTHESIA (OUTPATIENT)
Dept: OPERATING ROOM | Facility: HOSPITAL | Age: 60
DRG: 853 | End: 2025-05-06
Payer: MEDICARE

## 2025-05-06 ENCOUNTER — HOSPITAL ENCOUNTER (INPATIENT)
Facility: HOSPITAL | Age: 60
DRG: 853 | End: 2025-05-06
Attending: SURGERY | Admitting: SURGERY
Payer: MEDICARE

## 2025-05-06 VITALS
RESPIRATION RATE: 18 BRPM | BODY MASS INDEX: 26.6 KG/M2 | OXYGEN SATURATION: 98 % | HEART RATE: 87 BPM | TEMPERATURE: 97.3 F | DIASTOLIC BLOOD PRESSURE: 48 MMHG | WEIGHT: 190 LBS | SYSTOLIC BLOOD PRESSURE: 89 MMHG | HEIGHT: 71 IN

## 2025-05-06 DIAGNOSIS — A48.0 GAS GANGRENE (MULTI): ICD-10-CM

## 2025-05-06 DIAGNOSIS — M72.6 NECROTIZING FASCIITIS DUE TO MICROORGANISM (MULTI): ICD-10-CM

## 2025-05-06 DIAGNOSIS — N49.3 FOURNIER GANGRENE IN MALE: Primary | ICD-10-CM

## 2025-05-06 LAB
ALBUMIN SERPL BCP-MCNC: 3.2 G/DL (ref 3.4–5)
ALP SERPL-CCNC: 99 U/L (ref 33–120)
ALT SERPL W P-5'-P-CCNC: 24 U/L (ref 10–52)
ANION GAP BLDA CALCULATED.4IONS-SCNC: 11 MMO/L (ref 10–25)
ANION GAP BLDA CALCULATED.4IONS-SCNC: 12 MMO/L (ref 10–25)
ANION GAP SERPL CALC-SCNC: 18 MMOL/L (ref 10–20)
ANION GAP SERPL CALCULATED.3IONS-SCNC: 17 MMOL/L (ref 10–20)
APTT PPP: 27 SECONDS (ref 26–36)
AST SERPL W P-5'-P-CCNC: 17 U/L (ref 9–39)
ATRIAL RATE: 104 BPM
BASE EXCESS BLDA CALC-SCNC: -5.5 MMOL/L (ref -2–3)
BASE EXCESS BLDA CALC-SCNC: -5.6 MMOL/L (ref -2–3)
BASE EXCESS BLDA CALC-SCNC: -6.6 MMOL/L (ref -2–3)
BILIRUB SERPL-MCNC: 0.8 MG/DL (ref 0–1.2)
BODY TEMPERATURE: 37 DEGREES CELSIUS
BUN SERPL-MCNC: 22 MG/DL (ref 6–23)
BUN SERPL-MCNC: 23 MG/DL (ref 6–23)
CA-I BLDA-SCNC: 1.1 MMOL/L (ref 1.1–1.33)
CA-I BLDA-SCNC: 1.2 MMOL/L (ref 1.1–1.33)
CALCIUM SERPL-MCNC: 7.8 MG/DL (ref 8.6–10.6)
CALCIUM SERPL-MCNC: 7.9 MG/DL (ref 8.6–10.3)
CFT FORM KAOLIN IND BLD RES TEG: 0.8 MIN (ref 0.8–2.1)
CHLORIDE BLDA-SCNC: 103 MMOL/L (ref 98–107)
CHLORIDE BLDA-SCNC: 104 MMOL/L (ref 98–107)
CHLORIDE SERPL-SCNC: 102 MMOL/L (ref 98–107)
CHLORIDE SERPL-SCNC: 102 MMOL/L (ref 98–107)
CLOT ANGLE.KAOLIN INDUCED BLD RES TEG: 80 DEG (ref 63–78)
CLOT INIT KAO IND P HEP NEUT BLD RES TEG: 6.6 MIN (ref 4.6–9.1)
CLOT INIT KAO IND P HEP NEUT BLD RES TEG: 7.8 MIN (ref 4.3–8.3)
CO2 SERPL-SCNC: 17 MMOL/L (ref 21–32)
CO2 SERPL-SCNC: 17 MMOL/L (ref 21–32)
CREAT SERPL-MCNC: 1.05 MG/DL (ref 0.5–1.3)
CREAT SERPL-MCNC: 1.24 MG/DL (ref 0.5–1.3)
EGFRCR SERPLBLD CKD-EPI 2021: 67 ML/MIN/1.73M*2
EGFRCR SERPLBLD CKD-EPI 2021: 82 ML/MIN/1.73M*2
ERYTHROCYTE [DISTWIDTH] IN BLOOD BY AUTOMATED COUNT: 12.3 % (ref 11.5–14.5)
ERYTHROCYTE [DISTWIDTH] IN BLOOD BY AUTOMATED COUNT: 12.7 % (ref 11.5–14.5)
EST. AVERAGE GLUCOSE BLD GHB EST-MCNC: 148 MG/DL
FIBRINOGEN BLD CALC-MCNC: 858 MG/DL (ref 278–581)
FIBRINOGEN PPP-MCNC: 980 MG/DL (ref 200–400)
GLUCOSE BLD MANUAL STRIP-MCNC: 149 MG/DL (ref 74–99)
GLUCOSE BLD MANUAL STRIP-MCNC: 158 MG/DL (ref 74–99)
GLUCOSE BLD MANUAL STRIP-MCNC: 193 MG/DL (ref 74–99)
GLUCOSE BLD MANUAL STRIP-MCNC: 221 MG/DL (ref 74–99)
GLUCOSE BLD MANUAL STRIP-MCNC: 276 MG/DL (ref 74–99)
GLUCOSE BLDA-MCNC: 153 MG/DL (ref 74–99)
GLUCOSE BLDA-MCNC: 195 MG/DL (ref 74–99)
GLUCOSE SERPL-MCNC: 176 MG/DL (ref 74–99)
GLUCOSE SERPL-MCNC: 244 MG/DL (ref 74–99)
HBA1C MFR BLD: 6.8 % (ref ?–5.7)
HCO3 BLDA-SCNC: 16.7 MMOL/L (ref 22–26)
HCO3 BLDA-SCNC: 17.7 MMOL/L (ref 22–26)
HCO3 BLDA-SCNC: 18.2 MMOL/L (ref 22–26)
HCT VFR BLD AUTO: 37.5 % (ref 41–52)
HCT VFR BLD AUTO: 37.8 % (ref 41–52)
HCT VFR BLD EST: 38 % (ref 41–52)
HCT VFR BLD EST: 41 % (ref 41–52)
HGB BLD-MCNC: 13.3 G/DL (ref 13.5–17.5)
HGB BLD-MCNC: 13.4 G/DL (ref 13.5–17.5)
HGB BLDA-MCNC: 12.7 G/DL (ref 13.5–17.5)
HGB BLDA-MCNC: 13.6 G/DL (ref 13.5–17.5)
HOLD SPECIMEN: NORMAL
INHALED O2 CONCENTRATION: 21 %
INHALED O2 CONCENTRATION: 21 %
INHALED O2 CONCENTRATION: 28 %
INR PPP: 1.1 (ref 0.9–1.1)
LACTATE BLDA-SCNC: 1.2 MMOL/L (ref 0.4–2)
LACTATE BLDA-SCNC: 1.3 MMOL/L (ref 0.4–2)
LACTATE SERPL-SCNC: 1.3 MMOL/L (ref 0.4–2)
MA KAOLIN BLD RES TEG: 68 MM (ref 52–69)
MA KAOLIN+TF BLD RES TEG: 73 MM (ref 52–70)
MA TF IND+IIB-IIIA INH BLD RES TEG: 47 MM (ref 15–32)
MAGNESIUM SERPL-MCNC: 1.89 MG/DL (ref 1.6–2.4)
MAGNESIUM SERPL-MCNC: 2.1 MG/DL (ref 1.6–2.4)
MCH RBC QN AUTO: 30.3 PG (ref 26–34)
MCH RBC QN AUTO: 30.7 PG (ref 26–34)
MCHC RBC AUTO-ENTMCNC: 35.2 G/DL (ref 32–36)
MCHC RBC AUTO-ENTMCNC: 35.7 G/DL (ref 32–36)
MCV RBC AUTO: 85 FL (ref 80–100)
MCV RBC AUTO: 87 FL (ref 80–100)
NRBC BLD-RTO: 0 /100 WBCS (ref 0–0)
NRBC BLD-RTO: 0 /100 WBCS (ref 0–0)
OXYHGB MFR BLDA: 96.3 % (ref 94–98)
OXYHGB MFR BLDA: 96.9 % (ref 94–98)
OXYHGB MFR BLDA: 97.2 % (ref 94–98)
P AXIS: 94 DEGREES
P OFFSET: 158 MS
P ONSET: 124 MS
PCO2 BLDA: 27 MM HG (ref 38–42)
PCO2 BLDA: 28 MM HG (ref 38–42)
PCO2 BLDA: 30 MM HG (ref 38–42)
PH BLDA: 7.39 PH (ref 7.38–7.42)
PH BLDA: 7.4 PH (ref 7.38–7.42)
PH BLDA: 7.41 PH (ref 7.38–7.42)
PHOSPHATE SERPL-MCNC: 2.1 MG/DL (ref 2.5–4.9)
PHOSPHATE SERPL-MCNC: 3.7 MG/DL (ref 2.5–4.9)
PLATELET # BLD AUTO: 196 X10*3/UL (ref 150–450)
PLATELET # BLD AUTO: 235 X10*3/UL (ref 150–450)
PO2 BLDA: 109 MM HG (ref 85–95)
PO2 BLDA: 93 MM HG (ref 85–95)
PO2 BLDA: 96 MM HG (ref 85–95)
POTASSIUM BLDA-SCNC: 3 MMOL/L (ref 3.5–5.3)
POTASSIUM BLDA-SCNC: 3 MMOL/L (ref 3.5–5.3)
POTASSIUM SERPL-SCNC: 3.2 MMOL/L (ref 3.5–5.3)
POTASSIUM SERPL-SCNC: 3.4 MMOL/L (ref 3.5–5.3)
PR INTERVAL: 168 MS
PROT SERPL-MCNC: 5.7 G/DL (ref 6.4–8.2)
PROTHROMBIN TIME: 12.7 SECONDS (ref 9.8–12.4)
Q ONSET: 208 MS
QRS COUNT: 17 BEATS
QRS DURATION: 94 MS
QT INTERVAL: 338 MS
QTC CALCULATION(BAZETT): 444 MS
QTC FREDERICIA: 406 MS
R AXIS: 29 DEGREES
RBC # BLD AUTO: 4.33 X10*6/UL (ref 4.5–5.9)
RBC # BLD AUTO: 4.42 X10*6/UL (ref 4.5–5.9)
SAO2 % BLDA: 98 % (ref 94–100)
SAO2 % BLDA: 99 % (ref 94–100)
SAO2 % BLDA: 99 % (ref 94–100)
SODIUM BLDA-SCNC: 130 MMOL/L (ref 136–145)
SODIUM BLDA-SCNC: 130 MMOL/L (ref 136–145)
SODIUM SERPL-SCNC: 133 MMOL/L (ref 136–145)
SODIUM SERPL-SCNC: 134 MMOL/L (ref 136–145)
SPECIMEN DRAWN FROM PATIENT: ABNORMAL
T AXIS: 82 DEGREES
T OFFSET: 377 MS
VANCOMYCIN SERPL-MCNC: 31.1 UG/ML (ref 5–20)
VENTRICULAR RATE: 104 BPM
WBC # BLD AUTO: 18.3 X10*3/UL (ref 4.4–11.3)
WBC # BLD AUTO: 18.9 X10*3/UL (ref 4.4–11.3)

## 2025-05-06 PROCEDURE — 99238 HOSP IP/OBS DSCHRG MGMT 30/<: CPT | Performed by: INTERNAL MEDICINE

## 2025-05-06 PROCEDURE — 9420000001 HC RT PATIENT EDUCATION 5 MIN

## 2025-05-06 PROCEDURE — 2500000004 HC RX 250 GENERAL PHARMACY W/ HCPCS (ALT 636 FOR OP/ED): Mod: JZ

## 2025-05-06 PROCEDURE — 85027 COMPLETE CBC AUTOMATED: CPT

## 2025-05-06 PROCEDURE — 2500000004 HC RX 250 GENERAL PHARMACY W/ HCPCS (ALT 636 FOR OP/ED): Performed by: CLINICAL NURSE SPECIALIST

## 2025-05-06 PROCEDURE — 85610 PROTHROMBIN TIME: CPT

## 2025-05-06 PROCEDURE — 83735 ASSAY OF MAGNESIUM: CPT | Performed by: NURSE PRACTITIONER

## 2025-05-06 PROCEDURE — 84132 ASSAY OF SERUM POTASSIUM: CPT

## 2025-05-06 PROCEDURE — 99291 CRITICAL CARE FIRST HOUR: CPT | Performed by: NURSE PRACTITIONER

## 2025-05-06 PROCEDURE — 3700000002 HC GENERAL ANESTHESIA TIME - EACH INCREMENTAL 1 MINUTE: Performed by: STUDENT IN AN ORGANIZED HEALTH CARE EDUCATION/TRAINING PROGRAM

## 2025-05-06 PROCEDURE — 83036 HEMOGLOBIN GLYCOSYLATED A1C: CPT | Mod: WESLAB | Performed by: NURSE PRACTITIONER

## 2025-05-06 PROCEDURE — 7100000001 HC RECOVERY ROOM TIME - INITIAL BASE CHARGE: Performed by: STUDENT IN AN ORGANIZED HEALTH CARE EDUCATION/TRAINING PROGRAM

## 2025-05-06 PROCEDURE — 82810 BLOOD GASES O2 SAT ONLY: CPT

## 2025-05-06 PROCEDURE — 2500000002 HC RX 250 W HCPCS SELF ADMINISTERED DRUGS (ALT 637 FOR MEDICARE OP, ALT 636 FOR OP/ED): Performed by: NURSE PRACTITIONER

## 2025-05-06 PROCEDURE — 87075 CULTR BACTERIA EXCEPT BLOOD: CPT | Mod: WESLAB | Performed by: STUDENT IN AN ORGANIZED HEALTH CARE EDUCATION/TRAINING PROGRAM

## 2025-05-06 PROCEDURE — 2500000004 HC RX 250 GENERAL PHARMACY W/ HCPCS (ALT 636 FOR OP/ED): Mod: JZ | Performed by: NURSE PRACTITIONER

## 2025-05-06 PROCEDURE — 3600000008 HC OR TIME - EACH INCREMENTAL 1 MINUTE - PROCEDURE LEVEL THREE: Performed by: STUDENT IN AN ORGANIZED HEALTH CARE EDUCATION/TRAINING PROGRAM

## 2025-05-06 PROCEDURE — 3600000003 HC OR TIME - INITIAL BASE CHARGE - PROCEDURE LEVEL THREE: Performed by: STUDENT IN AN ORGANIZED HEALTH CARE EDUCATION/TRAINING PROGRAM

## 2025-05-06 PROCEDURE — 82947 ASSAY GLUCOSE BLOOD QUANT: CPT

## 2025-05-06 PROCEDURE — 2500000001 HC RX 250 WO HCPCS SELF ADMINISTERED DRUGS (ALT 637 FOR MEDICARE OP): Performed by: INTERNAL MEDICINE

## 2025-05-06 PROCEDURE — 2500000004 HC RX 250 GENERAL PHARMACY W/ HCPCS (ALT 636 FOR OP/ED): Mod: JZ | Performed by: INTERNAL MEDICINE

## 2025-05-06 PROCEDURE — 2500000004 HC RX 250 GENERAL PHARMACY W/ HCPCS (ALT 636 FOR OP/ED): Mod: JZ | Performed by: STUDENT IN AN ORGANIZED HEALTH CARE EDUCATION/TRAINING PROGRAM

## 2025-05-06 PROCEDURE — 85384 FIBRINOGEN ACTIVITY: CPT

## 2025-05-06 PROCEDURE — 80202 ASSAY OF VANCOMYCIN: CPT | Performed by: STUDENT IN AN ORGANIZED HEALTH CARE EDUCATION/TRAINING PROGRAM

## 2025-05-06 PROCEDURE — 84100 ASSAY OF PHOSPHORUS: CPT

## 2025-05-06 PROCEDURE — 99292 CRITICAL CARE ADDL 30 MIN: CPT | Performed by: INTERNAL MEDICINE

## 2025-05-06 PROCEDURE — 80053 COMPREHEN METABOLIC PANEL: CPT | Performed by: NURSE PRACTITIONER

## 2025-05-06 PROCEDURE — 0752T DGTZ GLS MCRSCP SLD LVL III: CPT | Mod: TC,WESLAB | Performed by: STUDENT IN AN ORGANIZED HEALTH CARE EDUCATION/TRAINING PROGRAM

## 2025-05-06 PROCEDURE — 2500000004 HC RX 250 GENERAL PHARMACY W/ HCPCS (ALT 636 FOR OP/ED): Performed by: NURSE ANESTHETIST, CERTIFIED REGISTERED

## 2025-05-06 PROCEDURE — 82330 ASSAY OF CALCIUM: CPT | Performed by: INTERNAL MEDICINE

## 2025-05-06 PROCEDURE — 1100000001 HC PRIVATE ROOM DAILY

## 2025-05-06 PROCEDURE — 37799 UNLISTED PX VASCULAR SURGERY: CPT | Performed by: NURSE PRACTITIONER

## 2025-05-06 PROCEDURE — 85027 COMPLETE CBC AUTOMATED: CPT | Performed by: NURSE PRACTITIONER

## 2025-05-06 PROCEDURE — 83735 ASSAY OF MAGNESIUM: CPT

## 2025-05-06 PROCEDURE — 83605 ASSAY OF LACTIC ACID: CPT | Performed by: NURSE PRACTITIONER

## 2025-05-06 PROCEDURE — 85730 THROMBOPLASTIN TIME PARTIAL: CPT

## 2025-05-06 PROCEDURE — 2500000002 HC RX 250 W HCPCS SELF ADMINISTERED DRUGS (ALT 637 FOR MEDICARE OP, ALT 636 FOR OP/ED)

## 2025-05-06 PROCEDURE — 99291 CRITICAL CARE FIRST HOUR: CPT | Performed by: INTERNAL MEDICINE

## 2025-05-06 PROCEDURE — 55100 DRAINAGE OF SCROTUM ABSCESS: CPT | Performed by: STUDENT IN AN ORGANIZED HEALTH CARE EDUCATION/TRAINING PROGRAM

## 2025-05-06 PROCEDURE — 2500000005 HC RX 250 GENERAL PHARMACY W/O HCPCS: Performed by: STUDENT IN AN ORGANIZED HEALTH CARE EDUCATION/TRAINING PROGRAM

## 2025-05-06 PROCEDURE — 11004 DBRDMT SKIN XTRNL GENT&PER: CPT | Performed by: STUDENT IN AN ORGANIZED HEALTH CARE EDUCATION/TRAINING PROGRAM

## 2025-05-06 PROCEDURE — 2500000005 HC RX 250 GENERAL PHARMACY W/O HCPCS: Performed by: NURSE ANESTHETIST, CERTIFIED REGISTERED

## 2025-05-06 PROCEDURE — 2500000004 HC RX 250 GENERAL PHARMACY W/ HCPCS (ALT 636 FOR OP/ED): Mod: JW | Performed by: NURSE PRACTITIONER

## 2025-05-06 PROCEDURE — 7100000002 HC RECOVERY ROOM TIME - EACH INCREMENTAL 1 MINUTE: Performed by: STUDENT IN AN ORGANIZED HEALTH CARE EDUCATION/TRAINING PROGRAM

## 2025-05-06 PROCEDURE — 2720000007 HC OR 272 NO HCPCS: Performed by: STUDENT IN AN ORGANIZED HEALTH CARE EDUCATION/TRAINING PROGRAM

## 2025-05-06 PROCEDURE — 87040 BLOOD CULTURE FOR BACTERIA: CPT

## 2025-05-06 PROCEDURE — 36415 COLL VENOUS BLD VENIPUNCTURE: CPT

## 2025-05-06 PROCEDURE — 82947 ASSAY GLUCOSE BLOOD QUANT: CPT | Performed by: NURSE PRACTITIONER

## 2025-05-06 PROCEDURE — 3700000001 HC GENERAL ANESTHESIA TIME - INITIAL BASE CHARGE: Performed by: STUDENT IN AN ORGANIZED HEALTH CARE EDUCATION/TRAINING PROGRAM

## 2025-05-06 PROCEDURE — 84100 ASSAY OF PHOSPHORUS: CPT | Performed by: NURSE PRACTITIONER

## 2025-05-06 PROCEDURE — 83605 ASSAY OF LACTIC ACID: CPT

## 2025-05-06 PROCEDURE — 99222 1ST HOSP IP/OBS MODERATE 55: CPT | Performed by: SURGERY

## 2025-05-06 RX ORDER — POTASSIUM CHLORIDE 29.8 MG/ML
40 INJECTION INTRAVENOUS ONCE
Status: DISCONTINUED | OUTPATIENT
Start: 2025-05-06 | End: 2025-05-06

## 2025-05-06 RX ORDER — ONDANSETRON HYDROCHLORIDE 2 MG/ML
INJECTION, SOLUTION INTRAVENOUS AS NEEDED
Status: DISCONTINUED | OUTPATIENT
Start: 2025-05-06 | End: 2025-05-06

## 2025-05-06 RX ORDER — OXYCODONE HYDROCHLORIDE 10 MG/1
10 TABLET ORAL EVERY 4 HOURS PRN
Status: DISCONTINUED | OUTPATIENT
Start: 2025-05-06 | End: 2025-05-12 | Stop reason: HOSPADM

## 2025-05-06 RX ORDER — ROSUVASTATIN CALCIUM 20 MG/1
40 TABLET, COATED ORAL DAILY
Status: DISCONTINUED | OUTPATIENT
Start: 2025-05-07 | End: 2025-05-12 | Stop reason: HOSPADM

## 2025-05-06 RX ORDER — ROSUVASTATIN CALCIUM 20 MG/1
40 TABLET, COATED ORAL DAILY
Status: DISCONTINUED | OUTPATIENT
Start: 2025-05-07 | End: 2025-05-06 | Stop reason: HOSPADM

## 2025-05-06 RX ORDER — VANCOMYCIN 1.75 G/350ML
1250 INJECTION, SOLUTION INTRAVENOUS ONCE
Status: COMPLETED | OUTPATIENT
Start: 2025-05-06 | End: 2025-05-06

## 2025-05-06 RX ORDER — VANCOMYCIN HYDROCHLORIDE 1 G/20ML
INJECTION, POWDER, LYOPHILIZED, FOR SOLUTION INTRAVENOUS DAILY PRN
Status: DISCONTINUED | OUTPATIENT
Start: 2025-05-06 | End: 2025-05-06

## 2025-05-06 RX ORDER — NALOXONE HYDROCHLORIDE 0.4 MG/ML
0.2 INJECTION, SOLUTION INTRAMUSCULAR; INTRAVENOUS; SUBCUTANEOUS EVERY 5 MIN PRN
Status: DISCONTINUED | OUTPATIENT
Start: 2025-05-06 | End: 2025-05-12 | Stop reason: HOSPADM

## 2025-05-06 RX ORDER — CLINDAMYCIN PHOSPHATE 900 MG/50ML
900 INJECTION, SOLUTION INTRAVENOUS EVERY 8 HOURS
Status: DISCONTINUED | OUTPATIENT
Start: 2025-05-06 | End: 2025-05-08

## 2025-05-06 RX ORDER — ONDANSETRON 4 MG/1
4 TABLET, FILM COATED ORAL EVERY 8 HOURS PRN
Status: DISCONTINUED | OUTPATIENT
Start: 2025-05-06 | End: 2025-05-12 | Stop reason: HOSPADM

## 2025-05-06 RX ORDER — BUPIVACAINE HYDROCHLORIDE 5 MG/ML
INJECTION, SOLUTION PERINEURAL AS NEEDED
Status: DISCONTINUED | OUTPATIENT
Start: 2025-05-06 | End: 2025-05-06 | Stop reason: HOSPADM

## 2025-05-06 RX ORDER — DEXTROSE 50 % IN WATER (D50W) INTRAVENOUS SYRINGE
12.5
Status: DISCONTINUED | OUTPATIENT
Start: 2025-05-06 | End: 2025-05-07

## 2025-05-06 RX ORDER — CLINDAMYCIN PHOSPHATE 900 MG/50ML
900 INJECTION, SOLUTION INTRAVENOUS EVERY 8 HOURS
Status: DISCONTINUED | OUTPATIENT
Start: 2025-05-06 | End: 2025-05-06

## 2025-05-06 RX ORDER — DEXTROSE 50 % IN WATER (D50W) INTRAVENOUS SYRINGE
25
Status: DISCONTINUED | OUTPATIENT
Start: 2025-05-06 | End: 2025-05-06 | Stop reason: HOSPADM

## 2025-05-06 RX ORDER — TAMSULOSIN HYDROCHLORIDE 0.4 MG/1
0.8 CAPSULE ORAL DAILY
Status: DISCONTINUED | OUTPATIENT
Start: 2025-05-06 | End: 2025-05-06 | Stop reason: HOSPADM

## 2025-05-06 RX ORDER — ACETAMINOPHEN 325 MG/1
650 TABLET ORAL EVERY 6 HOURS PRN
Status: DISCONTINUED | OUTPATIENT
Start: 2025-05-06 | End: 2025-05-06 | Stop reason: HOSPADM

## 2025-05-06 RX ORDER — ROCURONIUM BROMIDE 10 MG/ML
INJECTION, SOLUTION INTRAVENOUS AS NEEDED
Status: DISCONTINUED | OUTPATIENT
Start: 2025-05-06 | End: 2025-05-06

## 2025-05-06 RX ORDER — VANCOMYCIN HYDROCHLORIDE 1 G/200ML
1000 INJECTION, SOLUTION INTRAVENOUS EVERY 12 HOURS
Status: DISCONTINUED | OUTPATIENT
Start: 2025-05-07 | End: 2025-05-07

## 2025-05-06 RX ORDER — PHENYTOIN SODIUM 100 MG/1
100 CAPSULE, EXTENDED RELEASE ORAL DAILY
Status: DISCONTINUED | OUTPATIENT
Start: 2025-05-07 | End: 2025-05-12 | Stop reason: HOSPADM

## 2025-05-06 RX ORDER — INSULIN LISPRO 100 [IU]/ML
0-15 INJECTION, SOLUTION INTRAVENOUS; SUBCUTANEOUS EVERY 4 HOURS
Status: DISCONTINUED | OUTPATIENT
Start: 2025-05-06 | End: 2025-05-06 | Stop reason: HOSPADM

## 2025-05-06 RX ORDER — PANTOPRAZOLE SODIUM 40 MG/1
40 TABLET, DELAYED RELEASE ORAL DAILY
Status: DISCONTINUED | OUTPATIENT
Start: 2025-05-07 | End: 2025-05-06 | Stop reason: HOSPADM

## 2025-05-06 RX ORDER — ETOMIDATE 2 MG/ML
INJECTION INTRAVENOUS AS NEEDED
Status: DISCONTINUED | OUTPATIENT
Start: 2025-05-06 | End: 2025-05-06

## 2025-05-06 RX ORDER — SUMATRIPTAN SUCCINATE 50 MG/1
50 TABLET ORAL EVERY 2 HOUR PRN
Status: DISCONTINUED | OUTPATIENT
Start: 2025-05-06 | End: 2025-05-06

## 2025-05-06 RX ORDER — LINEZOLID 2 MG/ML
600 INJECTION, SOLUTION INTRAVENOUS EVERY 12 HOURS
Status: DISCONTINUED | OUTPATIENT
Start: 2025-05-06 | End: 2025-05-06 | Stop reason: HOSPADM

## 2025-05-06 RX ORDER — POTASSIUM CHLORIDE 14.9 MG/ML
20 INJECTION INTRAVENOUS
Status: COMPLETED | OUTPATIENT
Start: 2025-05-07 | End: 2025-05-07

## 2025-05-06 RX ORDER — LOSARTAN POTASSIUM 25 MG/1
25 TABLET ORAL DAILY
Status: DISCONTINUED | OUTPATIENT
Start: 2025-05-06 | End: 2025-05-06 | Stop reason: HOSPADM

## 2025-05-06 RX ORDER — POLYETHYLENE GLYCOL 3350 17 G/17G
17 POWDER, FOR SOLUTION ORAL DAILY PRN
Status: DISCONTINUED | OUTPATIENT
Start: 2025-05-06 | End: 2025-05-06 | Stop reason: HOSPADM

## 2025-05-06 RX ORDER — CLINDAMYCIN PHOSPHATE 900 MG/50ML
900 INJECTION, SOLUTION INTRAVENOUS ONCE
Status: COMPLETED | OUTPATIENT
Start: 2025-05-06 | End: 2025-05-06

## 2025-05-06 RX ORDER — NOREPINEPHRINE BITARTRATE/D5W 8 MG/250ML
.01-1 PLASTIC BAG, INJECTION (ML) INTRAVENOUS CONTINUOUS
Status: DISCONTINUED | OUTPATIENT
Start: 2025-05-06 | End: 2025-05-08

## 2025-05-06 RX ORDER — ASPIRIN 81 MG/1
81 TABLET ORAL DAILY
Status: DISCONTINUED | OUTPATIENT
Start: 2025-05-06 | End: 2025-05-06 | Stop reason: HOSPADM

## 2025-05-06 RX ORDER — VASOPRESSIN 20 [USP'U]/ML
INJECTION, SOLUTION INTRAVENOUS AS NEEDED
Status: DISCONTINUED | OUTPATIENT
Start: 2025-05-06 | End: 2025-05-06

## 2025-05-06 RX ORDER — TAMSULOSIN HYDROCHLORIDE 0.4 MG/1
0.8 CAPSULE ORAL DAILY
Status: DISCONTINUED | OUTPATIENT
Start: 2025-05-07 | End: 2025-05-12 | Stop reason: HOSPADM

## 2025-05-06 RX ORDER — NAPROXEN 500 MG/1
500 TABLET ORAL 2 TIMES DAILY PRN
Status: CANCELLED | OUTPATIENT
Start: 2025-05-06

## 2025-05-06 RX ORDER — DULOXETIN HYDROCHLORIDE 60 MG/1
60 CAPSULE, DELAYED RELEASE ORAL DAILY
Status: DISCONTINUED | OUTPATIENT
Start: 2025-05-06 | End: 2025-05-06 | Stop reason: HOSPADM

## 2025-05-06 RX ORDER — VANCOMYCIN HYDROCHLORIDE 1 G/20ML
INJECTION, POWDER, LYOPHILIZED, FOR SOLUTION INTRAVENOUS DAILY PRN
Status: DISCONTINUED | OUTPATIENT
Start: 2025-05-06 | End: 2025-05-07

## 2025-05-06 RX ORDER — ALBUTEROL SULFATE 90 UG/1
2 INHALANT RESPIRATORY (INHALATION) EVERY 4 HOURS PRN
Status: DISCONTINUED | OUTPATIENT
Start: 2025-05-06 | End: 2025-05-12 | Stop reason: HOSPADM

## 2025-05-06 RX ORDER — FENTANYL CITRATE 50 UG/ML
INJECTION, SOLUTION INTRAMUSCULAR; INTRAVENOUS AS NEEDED
Status: DISCONTINUED | OUTPATIENT
Start: 2025-05-06 | End: 2025-05-06

## 2025-05-06 RX ORDER — ENOXAPARIN SODIUM 100 MG/ML
30 INJECTION SUBCUTANEOUS EVERY 12 HOURS
Status: DISCONTINUED | OUTPATIENT
Start: 2025-05-06 | End: 2025-05-08

## 2025-05-06 RX ORDER — PANTOPRAZOLE SODIUM 40 MG/1
40 TABLET, DELAYED RELEASE ORAL
Status: DISCONTINUED | OUTPATIENT
Start: 2025-05-07 | End: 2025-05-12 | Stop reason: HOSPADM

## 2025-05-06 RX ORDER — ONDANSETRON HYDROCHLORIDE 2 MG/ML
4 INJECTION, SOLUTION INTRAVENOUS EVERY 8 HOURS PRN
Status: DISCONTINUED | OUTPATIENT
Start: 2025-05-06 | End: 2025-05-12 | Stop reason: HOSPADM

## 2025-05-06 RX ORDER — INSULIN LISPRO 100 [IU]/ML
0-5 INJECTION, SOLUTION INTRAVENOUS; SUBCUTANEOUS EVERY 4 HOURS
Status: DISCONTINUED | OUTPATIENT
Start: 2025-05-06 | End: 2025-05-06

## 2025-05-06 RX ORDER — DULOXETIN HYDROCHLORIDE 30 MG/1
60 CAPSULE, DELAYED RELEASE ORAL DAILY
Status: DISCONTINUED | OUTPATIENT
Start: 2025-05-07 | End: 2025-05-12 | Stop reason: HOSPADM

## 2025-05-06 RX ORDER — DEXTROSE 50 % IN WATER (D50W) INTRAVENOUS SYRINGE
25
Status: DISCONTINUED | OUTPATIENT
Start: 2025-05-06 | End: 2025-05-07

## 2025-05-06 RX ORDER — MIDAZOLAM HYDROCHLORIDE 1 MG/ML
INJECTION, SOLUTION INTRAMUSCULAR; INTRAVENOUS AS NEEDED
Status: DISCONTINUED | OUTPATIENT
Start: 2025-05-06 | End: 2025-05-06

## 2025-05-06 RX ORDER — PHENYTOIN SODIUM 100 MG/1
100 CAPSULE, EXTENDED RELEASE ORAL DAILY
Status: DISCONTINUED | OUTPATIENT
Start: 2025-05-07 | End: 2025-05-06 | Stop reason: HOSPADM

## 2025-05-06 RX ORDER — MIDODRINE HYDROCHLORIDE 10 MG/1
10 TABLET ORAL
Status: DISCONTINUED | OUTPATIENT
Start: 2025-05-06 | End: 2025-05-06 | Stop reason: HOSPADM

## 2025-05-06 RX ORDER — QUETIAPINE FUMARATE 25 MG/1
100 TABLET, FILM COATED ORAL NIGHTLY
Status: DISCONTINUED | OUTPATIENT
Start: 2025-05-06 | End: 2025-05-06 | Stop reason: HOSPADM

## 2025-05-06 RX ORDER — POLYETHYLENE GLYCOL 3350 17 G/17G
17 POWDER, FOR SOLUTION ORAL DAILY
Status: DISCONTINUED | OUTPATIENT
Start: 2025-05-07 | End: 2025-05-12 | Stop reason: HOSPADM

## 2025-05-06 RX ORDER — METHOCARBAMOL 500 MG/1
750 TABLET, FILM COATED ORAL EVERY 4 HOURS PRN
Status: DISCONTINUED | OUTPATIENT
Start: 2025-05-06 | End: 2025-05-07

## 2025-05-06 RX ORDER — DEXTROSE 50 % IN WATER (D50W) INTRAVENOUS SYRINGE
12.5
Status: DISCONTINUED | OUTPATIENT
Start: 2025-05-06 | End: 2025-05-06 | Stop reason: HOSPADM

## 2025-05-06 RX ORDER — ENOXAPARIN SODIUM 100 MG/ML
30 INJECTION SUBCUTANEOUS EVERY 12 HOURS
Status: CANCELLED | OUTPATIENT
Start: 2025-05-06

## 2025-05-06 RX ORDER — QUETIAPINE FUMARATE 50 MG/1
100 TABLET, FILM COATED ORAL NIGHTLY
Status: DISCONTINUED | OUTPATIENT
Start: 2025-05-06 | End: 2025-05-12 | Stop reason: HOSPADM

## 2025-05-06 RX ORDER — ACETAMINOPHEN 325 MG/1
975 TABLET ORAL EVERY 6 HOURS SCHEDULED
Status: DISCONTINUED | OUTPATIENT
Start: 2025-05-07 | End: 2025-05-07

## 2025-05-06 RX ORDER — CLINDAMYCIN PHOSPHATE 900 MG/50ML
900 INJECTION, SOLUTION INTRAVENOUS EVERY 8 HOURS
Status: CANCELLED | OUTPATIENT
Start: 2025-05-06

## 2025-05-06 RX ORDER — NOREPINEPHRINE BITARTRATE/D5W 8 MG/250ML
0-.2 PLASTIC BAG, INJECTION (ML) INTRAVENOUS CONTINUOUS
Status: DISPENSED | OUTPATIENT
Start: 2025-05-06 | End: 2025-05-06

## 2025-05-06 RX ORDER — HYDROMORPHONE HYDROCHLORIDE 0.2 MG/ML
0.2 INJECTION INTRAMUSCULAR; INTRAVENOUS; SUBCUTANEOUS EVERY 2 HOUR PRN
Status: DISCONTINUED | OUTPATIENT
Start: 2025-05-06 | End: 2025-05-07

## 2025-05-06 RX ORDER — OXYCODONE HYDROCHLORIDE 5 MG/1
5 TABLET ORAL EVERY 4 HOURS PRN
Status: DISCONTINUED | OUTPATIENT
Start: 2025-05-06 | End: 2025-05-12 | Stop reason: HOSPADM

## 2025-05-06 RX ADMIN — MIDAZOLAM 2 MG: 1 INJECTION INTRAMUSCULAR; INTRAVENOUS at 04:11

## 2025-05-06 RX ADMIN — NOREPINEPHRINE BITARTRATE 0.1 MCG/KG/MIN: 8 INJECTION, SOLUTION INTRAVENOUS at 06:27

## 2025-05-06 RX ADMIN — SODIUM CHLORIDE, POTASSIUM CHLORIDE, SODIUM LACTATE AND CALCIUM CHLORIDE: 600; 310; 30; 20 INJECTION, SOLUTION INTRAVENOUS at 04:11

## 2025-05-06 RX ADMIN — HYDROMORPHONE HYDROCHLORIDE 0.2 MG: 0.5 INJECTION, SOLUTION INTRAMUSCULAR; INTRAVENOUS; SUBCUTANEOUS at 14:58

## 2025-05-06 RX ADMIN — CLINDAMYCIN PHOSPHATE 900 MG: 900 INJECTION, SOLUTION INTRAVENOUS at 11:30

## 2025-05-06 RX ADMIN — FENTANYL CITRATE 25 MCG: 50 INJECTION, SOLUTION INTRAMUSCULAR; INTRAVENOUS at 05:41

## 2025-05-06 RX ADMIN — PIPERACILLIN SODIUM AND TAZOBACTAM SODIUM 3.38 G: 3; .375 INJECTION, SOLUTION INTRAVENOUS at 23:50

## 2025-05-06 RX ADMIN — ONDANSETRON 4 MG: 2 INJECTION, SOLUTION INTRAMUSCULAR; INTRAVENOUS at 05:21

## 2025-05-06 RX ADMIN — ACETAMINOPHEN 975 MG: 325 TABLET, FILM COATED ORAL at 23:51

## 2025-05-06 RX ADMIN — ROCURONIUM BROMIDE 50 MG: 10 INJECTION, SOLUTION INTRAVENOUS at 04:18

## 2025-05-06 RX ADMIN — VASOPRESSIN 1 UNITS: 20 INJECTION INTRAVENOUS at 04:50

## 2025-05-06 RX ADMIN — INSULIN LISPRO 3 UNITS: 100 INJECTION, SOLUTION INTRAVENOUS; SUBCUTANEOUS at 09:10

## 2025-05-06 RX ADMIN — HYDROMORPHONE HYDROCHLORIDE 0.2 MG: 0.5 INJECTION, SOLUTION INTRAMUSCULAR; INTRAVENOUS; SUBCUTANEOUS at 19:35

## 2025-05-06 RX ADMIN — MIDODRINE HYDROCHLORIDE 10 MG: 10 TABLET ORAL at 09:10

## 2025-05-06 RX ADMIN — HYDROMORPHONE HYDROCHLORIDE 0.2 MG: 0.5 INJECTION, SOLUTION INTRAMUSCULAR; INTRAVENOUS; SUBCUTANEOUS at 10:54

## 2025-05-06 RX ADMIN — QUETIAPINE FUMARATE 100 MG: 50 TABLET ORAL at 21:58

## 2025-05-06 RX ADMIN — SODIUM CHLORIDE, SODIUM LACTATE, POTASSIUM CHLORIDE, AND CALCIUM CHLORIDE 1000 ML: .6; .31; .03; .02 INJECTION, SOLUTION INTRAVENOUS at 22:27

## 2025-05-06 RX ADMIN — MIDODRINE HYDROCHLORIDE 10 MG: 10 TABLET ORAL at 17:09

## 2025-05-06 RX ADMIN — ETOMIDATE 14 MG: 2 INJECTION, SOLUTION INTRAVENOUS at 04:18

## 2025-05-06 RX ADMIN — POTASSIUM CHLORIDE 20 MEQ: 14.9 INJECTION, SOLUTION INTRAVENOUS at 23:50

## 2025-05-06 RX ADMIN — NOREPINEPHRINE BITARTRATE 0.11 MCG/KG/MIN: 8 INJECTION, SOLUTION INTRAVENOUS at 09:51

## 2025-05-06 RX ADMIN — MIDODRINE HYDROCHLORIDE 10 MG: 10 TABLET ORAL at 11:30

## 2025-05-06 RX ADMIN — ENOXAPARIN SODIUM 30 MG: 100 INJECTION SUBCUTANEOUS at 21:58

## 2025-05-06 RX ADMIN — PIPERACILLIN SODIUM AND TAZOBACTAM SODIUM 3.38 G: 3; .375 INJECTION, SOLUTION INTRAVENOUS at 17:09

## 2025-05-06 RX ADMIN — SODIUM CHLORIDE 1000 ML: 900 INJECTION, SOLUTION INTRAVENOUS at 18:15

## 2025-05-06 RX ADMIN — VASOPRESSIN 1 UNITS: 20 INJECTION INTRAVENOUS at 05:00

## 2025-05-06 RX ADMIN — FENTANYL CITRATE 25 MCG: 50 INJECTION, SOLUTION INTRAMUSCULAR; INTRAVENOUS at 05:38

## 2025-05-06 RX ADMIN — CLINDAMYCIN PHOSPHATE 900 MG: 900 INJECTION, SOLUTION INTRAVENOUS at 23:50

## 2025-05-06 RX ADMIN — VANCOMYCIN 1250 MG: 1.75 INJECTION, SOLUTION INTRAVENOUS at 03:36

## 2025-05-06 RX ADMIN — CLINDAMYCIN PHOSPHATE 900 MG: 900 INJECTION, SOLUTION INTRAVENOUS at 02:44

## 2025-05-06 RX ADMIN — FENTANYL CITRATE 25 MCG: 50 INJECTION, SOLUTION INTRAMUSCULAR; INTRAVENOUS at 05:33

## 2025-05-06 RX ADMIN — PIPERACILLIN SODIUM AND TAZOBACTAM SODIUM 3.38 G: 3; .375 INJECTION, SOLUTION INTRAVENOUS at 02:41

## 2025-05-06 RX ADMIN — SUGAMMADEX 200 MG: 100 INJECTION, SOLUTION INTRAVENOUS at 05:31

## 2025-05-06 RX ADMIN — INSULIN LISPRO 3 UNITS: 100 INJECTION, SOLUTION INTRAVENOUS; SUBCUTANEOUS at 11:25

## 2025-05-06 RX ADMIN — PIPERACILLIN SODIUM AND TAZOBACTAM SODIUM 3.38 G: 3; .375 INJECTION, SOLUTION INTRAVENOUS at 09:10

## 2025-05-06 RX ADMIN — INSULIN LISPRO 2 UNITS: 100 INJECTION, SOLUTION INTRAVENOUS; SUBCUTANEOUS at 22:40

## 2025-05-06 RX ADMIN — FENTANYL CITRATE 25 MCG: 50 INJECTION, SOLUTION INTRAMUSCULAR; INTRAVENOUS at 04:36

## 2025-05-06 RX ADMIN — VASOPRESSIN 1 UNITS: 20 INJECTION INTRAVENOUS at 05:15

## 2025-05-06 RX ADMIN — NOREPINEPHRINE BITARTRATE 0.11 MCG/KG/MIN: 8 INJECTION, SOLUTION INTRAVENOUS at 07:03

## 2025-05-06 RX ADMIN — LINEZOLID 600 MG: 600 INJECTION, SOLUTION INTRAVENOUS at 14:47

## 2025-05-06 SDOH — SOCIAL STABILITY: SOCIAL INSECURITY: WITHIN THE LAST YEAR, HAVE YOU BEEN AFRAID OF YOUR PARTNER OR EX-PARTNER?: NO

## 2025-05-06 SDOH — SOCIAL STABILITY: SOCIAL INSECURITY: DOES ANYONE TRY TO KEEP YOU FROM HAVING/CONTACTING OTHER FRIENDS OR DOING THINGS OUTSIDE YOUR HOME?: NO

## 2025-05-06 SDOH — ECONOMIC STABILITY: FOOD INSECURITY: WITHIN THE PAST 12 MONTHS, THE FOOD YOU BOUGHT JUST DIDN'T LAST AND YOU DIDN'T HAVE MONEY TO GET MORE.: NEVER TRUE

## 2025-05-06 SDOH — SOCIAL STABILITY: SOCIAL INSECURITY
WITHIN THE LAST YEAR, HAVE YOU BEEN RAPED OR FORCED TO HAVE ANY KIND OF SEXUAL ACTIVITY BY YOUR PARTNER OR EX-PARTNER?: NO

## 2025-05-06 SDOH — ECONOMIC STABILITY: FOOD INSECURITY: WITHIN THE PAST 12 MONTHS, YOU WORRIED THAT YOUR FOOD WOULD RUN OUT BEFORE YOU GOT THE MONEY TO BUY MORE.: NEVER TRUE

## 2025-05-06 SDOH — ECONOMIC STABILITY: HOUSING INSECURITY: AT ANY TIME IN THE PAST 12 MONTHS, WERE YOU HOMELESS OR LIVING IN A SHELTER (INCLUDING NOW)?: NO

## 2025-05-06 SDOH — HEALTH STABILITY: MENTAL HEALTH: HOW OFTEN DO YOU HAVE SIX OR MORE DRINKS ON ONE OCCASION?: NEVER

## 2025-05-06 SDOH — ECONOMIC STABILITY: INCOME INSECURITY: IN THE PAST 12 MONTHS HAS THE ELECTRIC, GAS, OIL, OR WATER COMPANY THREATENED TO SHUT OFF SERVICES IN YOUR HOME?: NO

## 2025-05-06 SDOH — HEALTH STABILITY: PHYSICAL HEALTH: ON AVERAGE, HOW MANY DAYS PER WEEK DO YOU ENGAGE IN MODERATE TO STRENUOUS EXERCISE (LIKE A BRISK WALK)?: 0 DAYS

## 2025-05-06 SDOH — SOCIAL STABILITY: SOCIAL NETWORK: HOW OFTEN DO YOU ATTEND CHURCH OR RELIGIOUS SERVICES?: NEVER

## 2025-05-06 SDOH — HEALTH STABILITY: PHYSICAL HEALTH
HOW OFTEN DO YOU NEED TO HAVE SOMEONE HELP YOU WHEN YOU READ INSTRUCTIONS, PAMPHLETS, OR OTHER WRITTEN MATERIAL FROM YOUR DOCTOR OR PHARMACY?: NEVER

## 2025-05-06 SDOH — ECONOMIC STABILITY: FOOD INSECURITY: WITHIN THE PAST 12 MONTHS, THE FOOD YOU BOUGHT JUST DIDN'T LAST AND YOU DIDN'T HAVE MONEY TO GET MORE.: OFTEN TRUE

## 2025-05-06 SDOH — SOCIAL STABILITY: SOCIAL INSECURITY
WITHIN THE LAST YEAR, HAVE YOU BEEN KICKED, HIT, SLAPPED, OR OTHERWISE PHYSICALLY HURT BY YOUR PARTNER OR EX-PARTNER?: NO

## 2025-05-06 SDOH — SOCIAL STABILITY: SOCIAL INSECURITY: HAVE YOU HAD THOUGHTS OF HARMING ANYONE ELSE?: NO

## 2025-05-06 SDOH — ECONOMIC STABILITY: FOOD INSECURITY: HOW HARD IS IT FOR YOU TO PAY FOR THE VERY BASICS LIKE FOOD, HOUSING, MEDICAL CARE, AND HEATING?: NOT VERY HARD

## 2025-05-06 SDOH — SOCIAL STABILITY: SOCIAL NETWORK
DO YOU BELONG TO ANY CLUBS OR ORGANIZATIONS SUCH AS CHURCH GROUPS, UNIONS, FRATERNAL OR ATHLETIC GROUPS, OR SCHOOL GROUPS?: NO

## 2025-05-06 SDOH — HEALTH STABILITY: MENTAL HEALTH: HOW MANY DRINKS CONTAINING ALCOHOL DO YOU HAVE ON A TYPICAL DAY WHEN YOU ARE DRINKING?: 1 OR 2

## 2025-05-06 SDOH — ECONOMIC STABILITY: HOUSING INSECURITY: IN THE LAST 12 MONTHS, WAS THERE A TIME WHEN YOU WERE NOT ABLE TO PAY THE MORTGAGE OR RENT ON TIME?: NO

## 2025-05-06 SDOH — SOCIAL STABILITY: SOCIAL INSECURITY: HAS ANYONE EVER THREATENED TO HURT YOUR FAMILY OR YOUR PETS?: NO

## 2025-05-06 SDOH — SOCIAL STABILITY: SOCIAL INSECURITY: DO YOU FEEL UNSAFE GOING BACK TO THE PLACE WHERE YOU ARE LIVING?: NO

## 2025-05-06 SDOH — SOCIAL STABILITY: SOCIAL INSECURITY: ARE YOU MARRIED, WIDOWED, DIVORCED, SEPARATED, NEVER MARRIED, OR LIVING WITH A PARTNER?: NEVER MARRIED

## 2025-05-06 SDOH — SOCIAL STABILITY: SOCIAL INSECURITY: ARE YOU OR HAVE YOU BEEN THREATENED OR ABUSED PHYSICALLY, EMOTIONALLY, OR SEXUALLY BY ANYONE?: YES

## 2025-05-06 SDOH — HEALTH STABILITY: MENTAL HEALTH: HOW OFTEN DO YOU HAVE A DRINK CONTAINING ALCOHOL?: 2-4 TIMES A MONTH

## 2025-05-06 SDOH — SOCIAL STABILITY: SOCIAL NETWORK
IN A TYPICAL WEEK, HOW MANY TIMES DO YOU TALK ON THE PHONE WITH FAMILY, FRIENDS, OR NEIGHBORS?: MORE THAN THREE TIMES A WEEK

## 2025-05-06 SDOH — SOCIAL STABILITY: SOCIAL INSECURITY: ARE THERE ANY APPARENT SIGNS OF INJURIES/BEHAVIORS THAT COULD BE RELATED TO ABUSE/NEGLECT?: NO

## 2025-05-06 SDOH — HEALTH STABILITY: PHYSICAL HEALTH: ON AVERAGE, HOW MANY MINUTES DO YOU ENGAGE IN EXERCISE AT THIS LEVEL?: 0 MIN

## 2025-05-06 SDOH — SOCIAL STABILITY: SOCIAL INSECURITY: WITHIN THE LAST YEAR, HAVE YOU BEEN HUMILIATED OR EMOTIONALLY ABUSED IN OTHER WAYS BY YOUR PARTNER OR EX-PARTNER?: NO

## 2025-05-06 SDOH — ECONOMIC STABILITY: HOUSING INSECURITY: IN THE PAST 12 MONTHS, HOW MANY TIMES HAVE YOU MOVED WHERE YOU WERE LIVING?: 0

## 2025-05-06 SDOH — HEALTH STABILITY: MENTAL HEALTH
DO YOU FEEL STRESS - TENSE, RESTLESS, NERVOUS, OR ANXIOUS, OR UNABLE TO SLEEP AT NIGHT BECAUSE YOUR MIND IS TROUBLED ALL THE TIME - THESE DAYS?: ONLY A LITTLE

## 2025-05-06 SDOH — SOCIAL STABILITY: SOCIAL NETWORK: HOW OFTEN DO YOU GET TOGETHER WITH FRIENDS OR RELATIVES?: MORE THAN THREE TIMES A WEEK

## 2025-05-06 SDOH — SOCIAL STABILITY: SOCIAL NETWORK: HOW OFTEN DO YOU ATTEND MEETINGS OF THE CLUBS OR ORGANIZATIONS YOU BELONG TO?: NEVER

## 2025-05-06 SDOH — ECONOMIC STABILITY: FOOD INSECURITY: WITHIN THE PAST 12 MONTHS, YOU WORRIED THAT YOUR FOOD WOULD RUN OUT BEFORE YOU GOT THE MONEY TO BUY MORE.: OFTEN TRUE

## 2025-05-06 SDOH — SOCIAL STABILITY: SOCIAL INSECURITY: DO YOU FEEL ANYONE HAS EXPLOITED OR TAKEN ADVANTAGE OF YOU FINANCIALLY OR OF YOUR PERSONAL PROPERTY?: NO

## 2025-05-06 SDOH — SOCIAL STABILITY: SOCIAL INSECURITY: HAVE YOU HAD ANY THOUGHTS OF HARMING ANYONE ELSE?: NO

## 2025-05-06 SDOH — ECONOMIC STABILITY: TRANSPORTATION INSECURITY: IN THE PAST 12 MONTHS, HAS LACK OF TRANSPORTATION KEPT YOU FROM MEDICAL APPOINTMENTS OR FROM GETTING MEDICATIONS?: NO

## 2025-05-06 SDOH — SOCIAL STABILITY: SOCIAL INSECURITY: WERE YOU ABLE TO COMPLETE ALL THE BEHAVIORAL HEALTH SCREENINGS?: YES

## 2025-05-06 SDOH — SOCIAL STABILITY: SOCIAL INSECURITY: ABUSE: ADULT

## 2025-05-06 ASSESSMENT — PAIN - FUNCTIONAL ASSESSMENT
PAIN_FUNCTIONAL_ASSESSMENT: 0-10

## 2025-05-06 ASSESSMENT — PAIN SCALES - GENERAL
PAINLEVEL_OUTOF10: 7
PAINLEVEL_OUTOF10: 0 - NO PAIN
PAINLEVEL_OUTOF10: 7
PAINLEVEL_OUTOF10: 0 - NO PAIN
PAINLEVEL_OUTOF10: 7
PAINLEVEL_OUTOF10: 0 - NO PAIN
PAINLEVEL_OUTOF10: 0 - NO PAIN
PAINLEVEL_OUTOF10: 2
PAINLEVEL_OUTOF10: 0 - NO PAIN

## 2025-05-06 ASSESSMENT — ENCOUNTER SYMPTOMS
ABDOMINAL PAIN: 0
CARDIOVASCULAR NEGATIVE: 1
HEMATURIA: 0
NAUSEA: 0
VOMITING: 0
FEVER: 1
RESPIRATORY NEGATIVE: 1

## 2025-05-06 ASSESSMENT — LIFESTYLE VARIABLES
PRESCIPTION_ABUSE_PAST_12_MONTHS: NO
HOW OFTEN DO YOU HAVE A DRINK CONTAINING ALCOHOL: MONTHLY OR LESS
SKIP TO QUESTIONS 9-10: 1
HOW OFTEN DO YOU HAVE 6 OR MORE DRINKS ON ONE OCCASION: NEVER
AUDIT-C TOTAL SCORE: 2
AUDIT-C TOTAL SCORE: 1
AUDIT-C TOTAL SCORE: 1
SUBSTANCE_ABUSE_PAST_12_MONTHS: YES
HOW MANY STANDARD DRINKS CONTAINING ALCOHOL DO YOU HAVE ON A TYPICAL DAY: 1 OR 2
SKIP TO QUESTIONS 9-10: 1

## 2025-05-06 ASSESSMENT — ACTIVITIES OF DAILY LIVING (ADL)
WALKS IN HOME: INDEPENDENT
LACK_OF_TRANSPORTATION: NO
GROOMING: INDEPENDENT
LACK_OF_TRANSPORTATION: NO
DRESSING YOURSELF: INDEPENDENT
FEEDING YOURSELF: INDEPENDENT
TOILETING: INDEPENDENT
HEARING - LEFT EAR: FUNCTIONAL
ADEQUATE_TO_COMPLETE_ADL: YES
PATIENT'S MEMORY ADEQUATE TO SAFELY COMPLETE DAILY ACTIVITIES?: YES
HEARING - RIGHT EAR: FUNCTIONAL
BATHING: INDEPENDENT
LACK_OF_TRANSPORTATION: NO
JUDGMENT_ADEQUATE_SAFELY_COMPLETE_DAILY_ACTIVITIES: YES

## 2025-05-06 ASSESSMENT — PAIN DESCRIPTION - LOCATION
LOCATION: SCROTUM
LOCATION: SCROTUM

## 2025-05-06 ASSESSMENT — PAIN DESCRIPTION - DESCRIPTORS
DESCRIPTORS: PRESSURE;THROBBING

## 2025-05-06 ASSESSMENT — COLUMBIA-SUICIDE SEVERITY RATING SCALE - C-SSRS
6. HAVE YOU EVER DONE ANYTHING, STARTED TO DO ANYTHING, OR PREPARED TO DO ANYTHING TO END YOUR LIFE?: NO
2. HAVE YOU ACTUALLY HAD ANY THOUGHTS OF KILLING YOURSELF?: NO
1. IN THE PAST MONTH, HAVE YOU WISHED YOU WERE DEAD OR WISHED YOU COULD GO TO SLEEP AND NOT WAKE UP?: NO

## 2025-05-06 ASSESSMENT — COGNITIVE AND FUNCTIONAL STATUS - GENERAL: PATIENT BASELINE BEDBOUND: NO

## 2025-05-06 NOTE — PROGRESS NOTES
05/06/25 1540   Physical Activity   On average, how many days per week do you engage in moderate to strenuous exercise (like a brisk walk)? 0 days   On average, how many minutes do you engage in exercise at this level? 0 min   Financial Resource Strain   How hard is it for you to pay for the very basics like food, housing, medical care, and heating? Not very   Housing Stability   In the last 12 months, was there a time when you were not able to pay the mortgage or rent on time? N   In the past 12 months, how many times have you moved where you were living? 0   At any time in the past 12 months, were you homeless or living in a shelter (including now)? N   Transportation Needs   In the past 12 months, has lack of transportation kept you from medical appointments or from getting medications? no   In the past 12 months, has lack of transportation kept you from meetings, work, or from getting things needed for daily living? No   Food Insecurity   Within the past 12 months, you worried that your food would run out before you got the money to buy more. Never true   Within the past 12 months, the food you bought just didn't last and you didn't have money to get more. Never true   Stress   Do you feel stress - tense, restless, nervous, or anxious, or unable to sleep at night because your mind is troubled all the time - these days? Only a littl   Social Connections   In a typical week, how many times do you talk on the phone with family, friends, or neighbors? More than 3   How often do you get together with friends or relatives? More than 3   How often do you attend Adventist or Bahai services? Never   Do you belong to any clubs or organizations such as Adventist groups, unions, fraternal or athletic groups, or school groups? No   How often do you attend meetings of the clubs or organizations you belong to? Never   Are you , , , , never , or living with a partner? Never marrie    Intimate Partner Violence   Within the last year, have you been afraid of your partner or ex-partner? No   Within the last year, have you been humiliated or emotionally abused in other ways by your partner or ex-partner? No   Within the last year, have you been kicked, hit, slapped, or otherwise physically hurt by your partner or ex-partner? No   Within the last year, have you been raped or forced to have any kind of sexual activity by your partner or ex-partner? No   Alcohol Use   Q1: How often do you have a drink containing alcohol? 2-4 pr month   Q2: How many drinks containing alcohol do you have on a typical day when you are drinking? 1 or 2   Q3: How often do you have six or more drinks on one occasion? Never   Utilities   In the past 12 months has the electric, gas, oil, or water company threatened to shut off services in your home? No   Health Literacy   How often do you need to have someone help you when you read instructions, pamphlets, or other written material from your doctor or pharmacy? Never   Follow-Ups   We make community resources available to all of our patients to assist with everyday needs. We may be able to connect you with those resources. Would you be interested? N

## 2025-05-06 NOTE — CONSULTS
Reason For Consult  Samm gangrene    History Of Present Illness  Michael Laguerre is a 59 y.o. male presenting with acute Samm's gangrene.  He presented to the ER yesterday with a painful draining wound in his perineum.  After thorough evaluation he was discovered to have an area of necrosis just lateral to the perianal region.  A CT scan done confirmed gas within the soft tissue space.  Last night he underwent emergent debridement of the perineum.  I was asked by the general surgery team to evaluate the genitalia.  He has a known history of diabetes.  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  This necessitated operative debridement.  He currently is in the ICU and is on pressor support.  His pain is moderate.  His cultures are pending.     Past Medical History  He has a past medical history of Acute pulmonary edema (08/24/2023), Altered metabolism due to diabetes (Multi), Angina pectoris (08/24/2023), Cervicalgia, Coronary artery disease (08/24/2023), Diabetes (Multi), 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 (11/04/2015), Essential hypertension (08/24/2023), Foreign body in cornea, left eye, initial encounter (11/04/2015), Localized traumatic opacities, right eye (10/26/2015), Mixed hyperlipidemia (08/24/2023), Other conditions influencing health status, Other conditions influencing health status, Pain in unspecified hip, Palpitations (08/24/2023), Peripheral vascular disease (CMS-HCC) (08/24/2023), Personal history of other diseases of the circulatory system, Personal history of other diseases of the digestive system, Personal history of other diseases of the digestive system, Personal history of other diseases of the musculoskeletal system and connective tissue, Personal history of other diseases of the nervous system and sense organs, Personal history of other diseases of the nervous system and sense organs, Personal history of other diseases of the nervous system and sense  organs, Personal history of other diseases of the respiratory system, Personal history of other mental and behavioral disorders, Shortness of breath (01/16/2024), Systolic heart failure (08/22/2022), Unspecified blepharitis left eye, unspecified eyelid (10/14/2015), Unspecified blepharitis left lower eyelid (10/14/2015), Unspecified blepharitis left lower eyelid (10/14/2015), Unspecified blepharitis left upper eyelid (10/14/2015), Unspecified blepharitis left upper eyelid (10/14/2015), Unspecified blepharitis right lower eyelid (10/14/2015), Unspecified blepharitis right lower eyelid (10/14/2015), Unspecified blepharitis right upper eyelid (10/14/2015), Unspecified blepharitis right upper eyelid (10/14/2015), Unspecified injury of left eye and orbit, initial encounter (11/04/2015), and Vasovagal syncope (01/16/2024).    Surgical History  He has a past surgical history that includes CT guided chest tube placement (12/21/2017) and CT guided percutaneous peritoneal or retroperitoneal fluid collection drainage (12/21/2017).     Social History  He reports that he has been smoking cigarettes. He started smoking about 46 years ago. He has a 91.3 pack-year smoking history. He has never been exposed to tobacco smoke. He has never used smokeless tobacco. He reports current alcohol use. He reports current drug use. Drug: Marijuana.    Family History  Family History[1]     Allergies  Varenicline, Metformin hcl, and Tramadol    Review of Systems  Review of Systems   Constitutional: Positive for fever and malaise/fatigue.   Cardiovascular: Negative.    Respiratory: Negative.     Gastrointestinal:  Negative for abdominal pain, nausea and vomiting.   Genitourinary:  Positive for genital sores and pelvic pain. Negative for hematuria.          Physical Exam  Awake, alert, no distress  Breathing comfortably, respirations unlabored  Abdomen soft, ND, NT  Open perineal wound, well debrided, clean skin edges  Posterior scrotum w/  "minimally exposed fascia, no areas of necrosis  Anterior scrotum and phallus intact without any evidence of cellulitis  Left testis palpable, non-tender  Right testis non-palpable, atrophic  Schmid with clear urine     Last Recorded Vitals  Blood pressure (!) 89/48, pulse 89, temperature 36.3 °C (97.3 °F), temperature source Temporal, resp. rate 14, height 1.803 m (5' 11\"), weight 86.2 kg (190 lb), SpO2 98%.    Relevant Results      Results for orders placed or performed during the hospital encounter of 05/05/25 (from the past 24 hours)   Urinalysis with Reflex Culture and Microscopic   Result Value Ref Range    Color, Urine Light-Yellow Light-Yellow, Yellow, Dark-Yellow    Appearance, Urine Clear Clear    Specific Gravity, Urine <1.005 (A) 1.005 - 1.035    pH, Urine 5.5 5.0, 5.5, 6.0, 6.5, 7.0, 7.5, 8.0    Protein, Urine 10 (TRACE) NEGATIVE, 10 (TRACE), 20 (TRACE) mg/dL    Glucose, Urine OVER (4+) (A) Normal mg/dL    Blood, Urine NEGATIVE NEGATIVE mg/dL    Ketones, Urine NEGATIVE NEGATIVE mg/dL    Bilirubin, Urine NEGATIVE NEGATIVE mg/dL    Urobilinogen, Urine Normal Normal mg/dL    Nitrite, Urine NEGATIVE NEGATIVE    Leukocyte Esterase, Urine NEGATIVE NEGATIVE   Extra Urine Gray Tube   Result Value Ref Range    Extra Tube Hold for add-ons.    Urinalysis Microscopic   Result Value Ref Range    WBC, Urine 11-20 (A) 1-5, NONE /HPF    RBC, Urine NONE NONE, 1-2, 3-5 /HPF    Squamous Epithelial Cells, Urine 1-9 (SPARSE) Reference range not established. /HPF    Bacteria, Urine 1+ (A) NONE SEEN /HPF   Tissue/Wound Culture/Smear    Specimen: ABSCESS; Swab   Result Value Ref Range    Gram Stain (2+) Few Polymorphonuclear leukocytes (A)     Gram Stain (4+) Abundant Mixed Gram positive bacteria (A)    Vancomycin   Result Value Ref Range    Vancomycin 31.1 (H) 5.0 - 20.0 ug/mL   CBC   Result Value Ref Range    WBC 18.9 (H) 4.4 - 11.3 x10*3/uL    nRBC 0.0 0.0 - 0.0 /100 WBCs    RBC 4.33 (L) 4.50 - 5.90 x10*6/uL    Hemoglobin " 13.3 (L) 13.5 - 17.5 g/dL    Hematocrit 37.8 (L) 41.0 - 52.0 %    MCV 87 80 - 100 fL    MCH 30.7 26.0 - 34.0 pg    MCHC 35.2 32.0 - 36.0 g/dL    RDW 12.7 11.5 - 14.5 %    Platelets 196 150 - 450 x10*3/uL   Comprehensive Metabolic Panel   Result Value Ref Range    Glucose 176 (H) 74 - 99 mg/dL    Sodium 133 (L) 136 - 145 mmol/L    Potassium 3.4 (L) 3.5 - 5.3 mmol/L    Chloride 102 98 - 107 mmol/L    Bicarbonate 17 (L) 21 - 32 mmol/L    Anion Gap 17 10 - 20 mmol/L    Urea Nitrogen 23 6 - 23 mg/dL    Creatinine 1.24 0.50 - 1.30 mg/dL    eGFR 67 >60 mL/min/1.73m*2    Calcium 7.9 (L) 8.6 - 10.3 mg/dL    Albumin 3.2 (L) 3.4 - 5.0 g/dL    Alkaline Phosphatase 99 33 - 120 U/L    Total Protein 5.7 (L) 6.4 - 8.2 g/dL    AST 17 9 - 39 U/L    Bilirubin, Total 0.8 0.0 - 1.2 mg/dL    ALT 24 10 - 52 U/L   Magnesium   Result Value Ref Range    Magnesium 1.89 1.60 - 2.40 mg/dL   Phosphorus   Result Value Ref Range    Phosphorus 3.7 2.5 - 4.9 mg/dL   Hemoglobin A1C   Result Value Ref Range    Hemoglobin A1C 6.8 (H) See comment %    Estimated Average Glucose 148 Not Established mg/dL   POCT GLUCOSE   Result Value Ref Range    POCT Glucose 193 (H) 74 - 99 mg/dL   Lactate   Result Value Ref Range    Lactate 1.3 0.4 - 2.0 mmol/L   POCT GLUCOSE   Result Value Ref Range    POCT Glucose 158 (H) 74 - 99 mg/dL   Blood Gas Arterial Full Panel   Result Value Ref Range    POCT pH, Arterial 7.41 7.38 - 7.42 pH    POCT pCO2, Arterial 28 (L) 38 - 42 mm Hg    POCT pO2, Arterial 109 (H) 85 - 95 mm Hg    POCT SO2, Arterial 99 94 - 100 %    POCT Oxy Hemoglobin, Arterial 97.2 94.0 - 98.0 %    POCT Hematocrit Calculated, Arterial 41.0 41.0 - 52.0 %    POCT Sodium, Arterial 130 (L) 136 - 145 mmol/L    POCT Potassium, Arterial 3.0 (L) 3.5 - 5.3 mmol/L    POCT Chloride, Arterial 104 98 - 107 mmol/L    POCT Ionized Calcium, Arterial 1.10 1.10 - 1.33 mmol/L    POCT Glucose, Arterial 153 (H) 74 - 99 mg/dL    POCT Lactate, Arterial 1.3 0.4 - 2.0 mmol/L    POCT  Base Excess, Arterial -5.5 (L) -2.0 - 3.0 mmol/L    POCT HCO3 Calculated, Arterial 17.7 (L) 22.0 - 26.0 mmol/L    POCT Hemoglobin, Arterial 13.6 13.5 - 17.5 g/dL    POCT Anion Gap, Arterial 11 10 - 25 mmo/L    Patient Temperature 37.0 degrees Celsius    FiO2 28 %    Site of Arterial Puncture Arterial Line    POCT GLUCOSE   Result Value Ref Range    POCT Glucose 149 (H) 74 - 99 mg/dL     *Note: Due to a large number of results and/or encounters for the requested time period, some results have not been displayed. A complete set of results can be found in Results Review.          Assessment/Plan     Samm's gangrene.    Agree with IV antibiotics.  Appreciate ICU care.   After a thorough exam his genitalia appear to be intact.  There does not seem to be any evidence of extension of the soft tissue infection anterior to the perineum.  I do recommend serial daily exams.  Ultimately he may need a skin graft in the future, and/or a large wound VAC.        I spent 60 minutes in the professional and overall care of this patient.      Adolfo Sousa MD         [1]   Family History  Problem Relation Name Age of Onset    Other (Heart Problems) Mother      Breast cancer Mother      Diabetes Mother      Heart disease Father      Diabetes Father      Other (Back Problems) Sister      Other (Neck Problems) Sister      Diabetes Sister      Diabetes Sister      Other (Gall Bladder Removed) Mother's Sister
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
## 2025-05-06 NOTE — PROGRESS NOTES
Vancomycin Dosing by Pharmacy- EMERGENCY DEPARTMENT    Orlin Laguerre is a 59 y.o. year old male who Pharmacy has been consulted to give a ONE TIME ONLY vancomycin dose in the Emergency Department for cellulitis, skin and soft tissue.     Visit Vitals  /59   Pulse 87   Temp 36.3 °C (97.3 °F) (Temporal)   Resp (!) 24        Lab Results   Component Value Date    CREATININE 1.92 (H) 05/05/2025    CREATININE 0.79 01/03/2025    CREATININE 0.80 03/11/2024    CREATININE 0.9 09/06/2023    CREATININE 0.7 01/16/2023    CREATININE 0.8 08/22/2022    CREATININE 0.8 08/17/2022        Patient weight is   Wt Readings from Last 1 Encounters:   05/05/25 86.2 kg (190 lb)        Assessment/Plan     Patient will be given a one time dose of 1250 mg  Pharmacy will sign off at this time. If vancomycin is to be continued, please re-consult Pharmacy.       Jero Damon, MelbaD
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
## 2025-05-06 NOTE — PROGRESS NOTES
TCC met with patient. Assessment complete. Patient lives in a house with his niece. Patient is independent. Patient uses a cane at times. Patient drives and is able to shop, cook and clean. Patient reports PTSD. Patient smokes about 25 cigarettes per day, drinks alcohol occasionally and smokes marijuana. Patient follows Dr. Galindo Titus. Patient fills prescriptions at [facility] in [place]. At this time there is not a safe discharge plan in place. Will follow.      05/06/25 2417   Discharge Planning   Living Arrangements Family members   Support Systems Family members   Type of Residence Private residence   Home or Post Acute Services Other (Comment)   Expected Discharge Disposition [place]  (TBD)   Does the patient need discharge transport arranged? No   Financial Resource Strain   How hard is it for you to pay for the very basics like food, housing, medical care, and heating? Not very   Housing Stability   In the last 12 months, was there a time when you were not able to pay the mortgage or rent on time? N   In the past 12 months, how many times have you moved where you were living? 0   At any time in the past 12 months, were you homeless or living in a shelter (including now)? N   Transportation Needs   In the past 12 months, has lack of transportation kept you from medical appointments or from getting medications? no   In the past 12 months, has lack of transportation kept you from meetings, work, or from getting things needed for daily living? No

## 2025-05-06 NOTE — CARE PLAN
The patient's goals for the shift include  comfort    The clinical goals for the shift include Maintain comfort, wean pressors as tolerated.      Problem: Diabetes  Goal: Achieve decreasing blood glucose levels by end of shift  Outcome: Progressing  Goal: Increase stability of blood glucose readings by end of shift  Outcome: Progressing  Goal: Decrease in ketones present in urine by end of shift  Outcome: Progressing  Goal: Maintain electrolyte levels within acceptable range throughout shift  Outcome: Progressing  Goal: Maintain glucose levels >70mg/dl to <250mg/dl throughout shift  Outcome: Progressing  Goal: No changes in neurological exam by end of shift  Outcome: Progressing  Goal: Learn about and adhere to nutrition recommendations by end of shift  Outcome: Progressing  Goal: Vital signs within normal range for age by end of shift  Outcome: Progressing  Goal: Increase self care and/or family involovement by end of shift  Outcome: Progressing  Goal: Receive DSME education by end of shift  Outcome: Progressing     Problem: Pain - Adult  Goal: Verbalizes/displays adequate comfort level or baseline comfort level  Outcome: Progressing     Problem: Safety - Adult  Goal: Free from fall injury  Outcome: Progressing     Problem: Discharge Planning  Goal: Discharge to home or other facility with appropriate resources  Outcome: Progressing     Problem: Chronic Conditions and Co-morbidities  Goal: Patient's chronic conditions and co-morbidity symptoms are monitored and maintained or improved  Outcome: Progressing     Problem: Nutrition  Goal: Nutrient intake appropriate for maintaining nutritional needs  Outcome: Progressing     Problem: Skin  Goal: Decreased wound size/increased tissue granulation at next dressing change  Outcome: Progressing  Flowsheets (Taken 5/6/2025 1802)  Decreased wound size/increased tissue granulation at next dressing change:   Promote sleep for wound healing   Protective dressings over bony  prominences   Utilize specialty bed per algorithm  Goal: Participates in plan/prevention/treatment measures  Outcome: Progressing  Flowsheets (Taken 5/6/2025 1802)  Participates in plan/prevention/treatment measures: Elevate heels  Goal: Prevent/manage excess moisture  Outcome: Progressing  Flowsheets (Taken 5/6/2025 1802)  Prevent/manage excess moisture:   Cleanse incontinence/protect with barrier cream   Moisturize dry skin   Follow provider orders for dressing changes   Monitor for/manage infection if present  Goal: Prevent/minimize sheer/friction injuries  Outcome: Progressing  Flowsheets (Taken 5/6/2025 1802)  Prevent/minimize sheer/friction injuries:   Complete micro-shifts as needed if patient unable. Adjust patient position to relieve pressure points, not a full turn   Increase activity/out of bed for meals   Use pull sheet   HOB 30 degrees or less   Turn/reposition every 2 hours/use positioning/transfer devices   Utilize specialty bed per algorithm  Goal: Promote/optimize nutrition  Outcome: Progressing  Flowsheets (Taken 5/6/2025 1802)  Promote/optimize nutrition: Discuss with provider if NPO > 2 days  Goal: Promote skin healing  Outcome: Progressing  Flowsheets (Taken 5/6/2025 1802)  Promote skin healing:   Assess skin/pad under line(s)/device(s)   Protective dressings over bony prominences   Turn/reposition every 2 hours/use positioning/transfer devices   Ensure correct size (line/device) and apply per  instructions   Rotate device position/do not position patient on device     Problem: Pain  Goal: Takes deep breaths with improved pain control throughout the shift  Outcome: Progressing  Goal: Turns in bed with improved pain control throughout the shift  Outcome: Progressing  Goal: Walks with improved pain control throughout the shift  Outcome: Progressing  Goal: Performs ADL's with improved pain control throughout shift  Outcome: Progressing  Goal: Participates in PT with improved pain  control throughout the shift  Outcome: Progressing  Goal: Free from opioid side effects throughout the shift  Outcome: Progressing  Goal: Free from acute confusion related to pain meds throughout the shift  Outcome: Progressing

## 2025-05-06 NOTE — ANESTHESIA POSTPROCEDURE EVALUATION
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    Anesthesia Post Evaluation    Patient location during evaluation: ICU  Patient participation: complete - patient participated  Level of consciousness: awake  Pain management: adequate  Airway patency: patent  Cardiovascular status: acceptable  Respiratory status: acceptable  Hydration status: acceptable  Postoperative Nausea and Vomiting: none        There were no known notable events for this encounter.    "

## 2025-05-06 NOTE — ED PROCEDURE NOTE
Procedure  Critical Care    Performed by: Wesley Griffith DO  Authorized by: Wesley Griffith DO    Critical care provider statement:     Critical care time (minutes):  36    Critical care time was exclusive of:  Separately billable procedures and treating other patients    Critical care was necessary to treat or prevent imminent or life-threatening deterioration of the following conditions:  Sepsis and shock    Critical care was time spent personally by me on the following activities:  Ordering and performing treatments and interventions, ordering and review of laboratory studies, ordering and review of radiographic studies, pulse oximetry, re-evaluation of patient's condition, examination of patient, obtaining history from patient or surrogate, discussions with consultants, development of treatment plan with patient or surrogate and evaluation of patient's response to treatment (General surgery Dr. Newsome, Urology Dr. Sousa)               Wesley Griffith DO  05/06/25 1018

## 2025-05-06 NOTE — PROGRESS NOTES
Vancomycin Dosing by Pharmacy- Cessation of Therapy    Consult to pharmacy for vancomycin dosing has been discontinued by the prescriber, pharmacy will sign off at this time.    Please call pharmacy if there are further questions or re-enter a consult if vancomycin is resumed.     Diana Acevedo, MelbaD

## 2025-05-06 NOTE — CONSULTS
Reason For Consult  Perineal Necrotizing Soft Tissue Infection    History Of Present Illness  Michael Laguerre is a 59 y.o. male presenting with NSTI of perineum that extends to base of scrotum on exam. Patient indicates he had a previous boil between his scrotum and his anus that he lanced and now the area has swelled throughout perineum and extending perianally. Indicates this is similar to previous episode of perineal NSTI which required diverting colostomy and prolonged ICU admission.    Hyperglycemic 247, Leukocytosis 19.7  Hypotensive on pressors     Past Medical History  He has a past medical history of Acute pulmonary edema (08/24/2023), Altered metabolism due to diabetes (Multi), Angina pectoris (08/24/2023), Cervicalgia, Coronary artery disease (08/24/2023), Diabetes (Multi), 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 (11/04/2015), Essential hypertension (08/24/2023), Foreign body in cornea, left eye, initial encounter (11/04/2015), Localized traumatic opacities, right eye (10/26/2015), Mixed hyperlipidemia (08/24/2023), Other conditions influencing health status, Other conditions influencing health status, Pain in unspecified hip, Palpitations (08/24/2023), Peripheral vascular disease (CMS-HCC) (08/24/2023), Personal history of other diseases of the circulatory system, Personal history of other diseases of the digestive system, Personal history of other diseases of the digestive system, Personal history of other diseases of the musculoskeletal system and connective tissue, Personal history of other diseases of the nervous system and sense organs, Personal history of other diseases of the nervous system and sense organs, Personal history of other diseases of the nervous system and sense organs, Personal history of other diseases of the respiratory system, Personal history of other mental and behavioral disorders, Shortness of breath (01/16/2024), Systolic heart failure (08/22/2022), Unspecified blepharitis  left eye, unspecified eyelid (10/14/2015), Unspecified blepharitis left lower eyelid (10/14/2015), Unspecified blepharitis left lower eyelid (10/14/2015), Unspecified blepharitis left upper eyelid (10/14/2015), Unspecified blepharitis left upper eyelid (10/14/2015), Unspecified blepharitis right lower eyelid (10/14/2015), Unspecified blepharitis right lower eyelid (10/14/2015), Unspecified blepharitis right upper eyelid (10/14/2015), Unspecified blepharitis right upper eyelid (10/14/2015), Unspecified injury of left eye and orbit, initial encounter (11/04/2015), and Vasovagal syncope (01/16/2024).    Surgical History  He has a past surgical history that includes CT guided chest tube placement (12/21/2017) and CT guided percutaneous peritoneal or retroperitoneal fluid collection drainage (12/21/2017).     Social History  He reports that he has been smoking cigarettes. He started smoking about 46 years ago. He has a 91.3 pack-year smoking history. He has never been exposed to tobacco smoke. He has never used smokeless tobacco. He reports current alcohol use. He reports current drug use. Drug: Marijuana.    Family History  Family History[1]     Allergies  Varenicline, Metformin hcl, and Tramadol       Physical Exam  Physical Exam  Vitals and nursing note reviewed.   Constitutional:       Appearance: He is toxic-appearing.   HENT:      Head: Normocephalic and atraumatic.      Right Ear: External ear normal.      Left Ear: External ear normal.      Nose: Nose normal.      Mouth/Throat:      Mouth: Mucous membranes are dry.      Pharynx: Oropharynx is clear.   Eyes:      General: No scleral icterus.     Conjunctiva/sclera: Conjunctivae normal.   Cardiovascular:      Rate and Rhythm: Normal rate.   Pulmonary:      Effort: No respiratory distress.   Abdominal:      General: There is no distension.      Palpations: Abdomen is soft.      Tenderness: There is no abdominal tenderness.   Genitourinary:     Penis: Normal.          "  Comments: Induration of the right perineum extending from anterior anus to base of scrotum.  Tenderness extends into scrotum.    Black necrotic ulceration  Musculoskeletal:         General: No swelling or tenderness. Normal range of motion.      Cervical back: Normal range of motion.   Skin:     General: Skin is warm.      Findings: Lesion present.   Neurological:      Mental Status: He is alert.            Last Recorded Vitals  Blood pressure 78/67, pulse 94, temperature 36.3 °C (97.3 °F), temperature source Temporal, resp. rate 20, height 1.803 m (5' 11\"), weight 86.2 kg (190 lb), SpO2 99%.    Relevant Results  Results for orders placed or performed during the hospital encounter of 05/05/25 (from the past 24 hours)   POCT GLUCOSE   Result Value Ref Range    POCT Glucose 242 (H) 74 - 99 mg/dL   CBC and Auto Differential   Result Value Ref Range    WBC 19.7 (H) 4.4 - 11.3 x10*3/uL    nRBC 0.0 0.0 - 0.0 /100 WBCs    RBC 4.86 4.50 - 5.90 x10*6/uL    Hemoglobin 15.1 13.5 - 17.5 g/dL    Hematocrit 41.7 41.0 - 52.0 %    MCV 86 80 - 100 fL    MCH 31.1 26.0 - 34.0 pg    MCHC 36.2 (H) 32.0 - 36.0 g/dL    RDW 12.5 11.5 - 14.5 %    Platelets 184 150 - 450 x10*3/uL    Neutrophils % 89.3 40.0 - 80.0 %    Immature Granulocytes %, Automated 3.5 (H) 0.0 - 0.9 %    Lymphocytes % 3.2 13.0 - 44.0 %    Monocytes % 2.6 2.0 - 10.0 %    Eosinophils % 1.0 0.0 - 6.0 %    Basophils % 0.4 0.0 - 2.0 %    Neutrophils Absolute 17.61 (H) 1.20 - 7.70 x10*3/uL    Immature Granulocytes Absolute, Automated 0.68 0.00 - 0.70 x10*3/uL    Lymphocytes Absolute 0.63 (L) 1.20 - 4.80 x10*3/uL    Monocytes Absolute 0.51 0.10 - 1.00 x10*3/uL    Eosinophils Absolute 0.19 0.00 - 0.70 x10*3/uL    Basophils Absolute 0.08 0.00 - 0.10 x10*3/uL   Comprehensive Metabolic Panel   Result Value Ref Range    Glucose 247 (H) 74 - 99 mg/dL    Sodium 128 (L) 136 - 145 mmol/L    Potassium 3.0 (L) 3.5 - 5.3 mmol/L    Chloride 93 (L) 98 - 107 mmol/L    Bicarbonate 23 21 - 32 " mmol/L    Anion Gap 15 10 - 20 mmol/L    Urea Nitrogen 25 (H) 6 - 23 mg/dL    Creatinine 1.92 (H) 0.50 - 1.30 mg/dL    eGFR 40 (L) >60 mL/min/1.73m*2    Calcium 8.5 (L) 8.6 - 10.3 mg/dL    Albumin 3.7 3.4 - 5.0 g/dL    Alkaline Phosphatase 100 33 - 120 U/L    Total Protein 6.5 6.4 - 8.2 g/dL    AST 28 9 - 39 U/L    Bilirubin, Total 0.7 0.0 - 1.2 mg/dL    ALT 37 10 - 52 U/L   Magnesium   Result Value Ref Range    Magnesium 2.06 1.60 - 2.40 mg/dL   Troponin I, High Sensitivity, Initial   Result Value Ref Range    Troponin I, High Sensitivity 5 0 - 20 ng/L   Lactate   Result Value Ref Range    Lactate 2.5 (H) 0.4 - 2.0 mmol/L   Troponin, High Sensitivity, 1 Hour   Result Value Ref Range    Troponin I, High Sensitivity 4 0 - 20 ng/L   Lactate   Result Value Ref Range    Lactate 1.2 0.4 - 2.0 mmol/L   Urinalysis with Reflex Culture and Microscopic   Result Value Ref Range    Color, Urine Light-Yellow Light-Yellow, Yellow, Dark-Yellow    Appearance, Urine Clear Clear    Specific Gravity, Urine <1.005 (A) 1.005 - 1.035    pH, Urine 5.5 5.0, 5.5, 6.0, 6.5, 7.0, 7.5, 8.0    Protein, Urine 10 (TRACE) NEGATIVE, 10 (TRACE), 20 (TRACE) mg/dL    Glucose, Urine OVER (4+) (A) Normal mg/dL    Blood, Urine NEGATIVE NEGATIVE mg/dL    Ketones, Urine NEGATIVE NEGATIVE mg/dL    Bilirubin, Urine NEGATIVE NEGATIVE mg/dL    Urobilinogen, Urine Normal Normal mg/dL    Nitrite, Urine NEGATIVE NEGATIVE    Leukocyte Esterase, Urine NEGATIVE NEGATIVE   Urinalysis Microscopic   Result Value Ref Range    WBC, Urine 11-20 (A) 1-5, NONE /HPF    RBC, Urine NONE NONE, 1-2, 3-5 /HPF    Squamous Epithelial Cells, Urine 1-9 (SPARSE) Reference range not established. /HPF    Bacteria, Urine 1+ (A) NONE SEEN /HPF     *Note: Due to a large number of results and/or encounters for the requested time period, some results have not been displayed. A complete set of results can be found in Results Review.          Assessment/Plan     59M with perineal NSTI.  Based  on my exam, I am concerned that there may be scrotal involvement and recommended urgent urology evaluation. ED contacted the urologist on call.     Urologist indicated that this pathology requires transfer for higher level of care.      Will follow closely pending transfer    I spent 45 minutes in the professional and overall care of this patient.      Maribell Licona MD         [1]   Family History  Problem Relation Name Age of Onset    Other (Heart Problems) Mother      Breast cancer Mother      Diabetes Mother      Heart disease Father      Diabetes Father      Other (Back Problems) Sister      Other (Neck Problems) Sister      Diabetes Sister      Diabetes Sister      Other (Gall Bladder Removed) Mother's Sister

## 2025-05-06 NOTE — CONSULTS
Inpatient consult to Acute Care Surgery  Consult performed by: YAIMA Johnson-CNP  Consult ordered by: Maribell Licona MD      See 5/5 consult

## 2025-05-06 NOTE — CONSULTS
"Vancomycin Dosing by Pharmacy- [facility] INITIAL    Orlin Laguerre is a 59 y.o. year old male who Pharmacy has been consulted for vancomycin dosing for cellulitis/skin and soft tissue infection. Based on the patient's indication and renal status this patient will be dosed based on a target level of SSTI/UTI: 10-15 mcg/mL    Patient is currently in [facility].    Initial Dosing: Received 2000 mg in ED followed by 1250 mg 12 hours later    Visit Vitals  /55   Pulse 96   Temp 36.3 °C (97.3 °F) (Temporal)   Resp (!) 31           Lab Results   Component Value Date    CREATININE 1.92 (H) 05/05/2025    CREATININE 0.79 01/03/2025    CREATININE 0.80 03/11/2024    CREATININE 0.9 09/06/2023    CREATININE 0.7 01/16/2023    CREATININE 0.8 08/22/2022    CREATININE 0.8 08/17/2022       I/O last 3 completed shifts:  In: 4719.3 (54.8 mL/kg) [IV Piggyback:4719.3]  Out: - (0 mL/kg)   Weight: 86.2 kg     No results found for: \"PATIENTTEMP\"       Assessment/Plan     Random level within 24 hours of first dose will be obtained on 05/06/25 at 0800. May be obtained sooner if clinically indicated.   Will continue to monitor renal function daily while on vancomycin and order serum creatinine at least every 48 hours if not already ordered.  Follow for continued vancomycin needs, clinical response, and signs/symptoms of toxicity.     Jero Damon, PharmD    "

## 2025-05-06 NOTE — ANESTHESIA PROCEDURE NOTES
Airway  Date/Time: 5/6/2025 4:19 AM  Reason: elective    Airway not difficult    Staffing  Performed: CRNA   Authorized by: MAXIMO Marin    Performed by: MAXIMO Marin  Patient location during procedure: OR    Patient Condition  Indications for airway management: anesthesia  Patient position: sniffing  Planned trial extubation  Sedation level: deep     Final Airway Details   Preoxygenated: yes  Final airway type: endotracheal airway  Successful airway: ETT  Cuffed: yes   Successful intubation technique: video laryngoscopy  Adjuncts used in placement: intubating stylet  Endotracheal tube insertion site: oral  Blade: Rosemarie  Blade size: #3  ETT size (mm): 7.5  Cormack-Lehane Classification: grade I - full view of glottis  Placement verified by: capnometry   Measured from: lips  ETT to lips (cm): 21  Number of attempts at approach: 1    Additional Comments  [place] 3 videolaryngoscope used,  LTA kit applied, lubricated tube, atraumatic intubation.

## 2025-05-06 NOTE — PROGRESS NOTES
"ACUTE CARE SURGERY PROGRESS NOTE  5/6/2025   [id number]     HPI: 59M presenting with NSTI of perineum that extends to base of scrotum on exam. Underwent I&D for lucien gangrene.    OVERNIGHT EVENTS: OR    ASSESSMENT & PLAN:  BP (!) 89/48   Pulse 91   Temp 36.3 °C (97.3 °F) (Temporal)   Resp 20   Ht 1.803 m (5' 11\")   Wt 86.2 kg (190 lb)   SpO2 99%   BMI 26.50 kg/m²   Problem/Injury Exam and Plan Resolved   Lucien Gangrene with Sepsis Post op I&D  In the ICU on single pressor  Pain is well-managed  Lactic has normalized  Leukocytosis present but downtrending today-continue antibiotics and twice daily dressing changes as ordered  Cultures pending  Trial clear liquid diet and advance to regular diet as tolerated    Leukocytosis Continue abx, trend labs                                                 Heart Rate:  []   Temp:  [36.3 °C (97.3 °F)]   Resp:  [10-31]   BP: ()/()   Height:  [180.3 cm (5' 11\")]   Weight:  [86.2 kg (190 lb)]   SpO2:  [97 %-100 %]        Blood pressure (!) 89/48, pulse 91, temperature 36.3 °C (97.3 °F), temperature source Temporal, resp. rate 20, height 1.803 m (5' 11\"), weight 86.2 kg (190 lb), SpO2 99%.     Physical Exam  Vitals and nursing note reviewed.   Constitutional:       Appearance: Normal appearance.   HENT:      Head: Normocephalic and atraumatic.   Cardiovascular:      Rate and Rhythm: Normal rate and regular rhythm.   Pulmonary:      Effort: Pulmonary effort is normal. No respiratory distress.   Abdominal:      General: Bowel sounds are normal. There is no distension.      Palpations: Abdomen is soft.      Tenderness: There is no abdominal tenderness.   Genitourinary:     Comments: Deferred, patient supine for hypotension  Musculoskeletal:         General: No swelling or tenderness.   Skin:     General: Skin is warm and dry.   Neurological:      Mental Status: He is alert and oriented to person, place, and time.            Intake/Output Summary (Last 24 " hours) at 5/6/2025 1106  Last data filed at 5/6/2025 1000  Gross per 24 hour   Intake 5569.33 ml   Output 1220 ml   Net 4349.33 ml       CBC, elctrolytes/BMP and pertinent imaging reviewed.  Labs:   Results from last 72 hours   Lab Units 05/06/25  0728 05/05/25  1552   SODIUM mmol/L 133* 128*   POTASSIUM mmol/L 3.4* 3.0*   CHLORIDE mmol/L 102 93*   CO2 mmol/L 17* 23   BUN mg/dL 23 25*   CREATININE mg/dL 1.24 1.92*   GLUCOSE mg/dL 176* 247*   CALCIUM mg/dL 7.9* 8.5*   ANION GAP mmol/L 17 15   EGFR mL/min/1.73m*2 67 40*   PHOSPHORUS mg/dL 3.7  --       Results from last 72 hours   Lab Units 05/06/25  0728 05/05/25  1552   WBC AUTO x10*3/uL 18.9* 19.7*   HEMOGLOBIN g/dL 13.3* 15.1   HEMATOCRIT % 37.8* 41.7   PLATELETS AUTO x10*3/uL 196 184   NEUTROS PCT AUTO %  --  89.3   LYMPHS PCT AUTO %  --  3.2   MONOS PCT AUTO %  --  2.6   EOS PCT AUTO %  --  1.0                 Micro/ID:     Lab Results   Component Value Date    BLOODCULT Loaded on Instrument - Culture in progress 05/05/2025    BLOODCULT Loaded on Instrument - Culture in progress 05/05/2025              Savannah Perez, YAIMA-CNP   5/6/2025, 11:06 AM

## 2025-05-06 NOTE — ANESTHESIA PREPROCEDURE EVALUATION
"Patient: Orlin Laguerre \"Michael\"    Procedure Information       Date/Time: 05/06/25 0400    Procedure: INCISION AND DRAINAGE, PERINEUM (Right)    Location: [facility] / [facility]    Surgeons: Maribell Licona MD            Relevant Problems   Cardiac   (+) Angina pectoris   (+) Chest pain   (+) Coronary artery disease   (+) Essential hypertension   (+) Hypertriglyceridemia   (+) Mixed hyperlipidemia   (+) Peripheral vascular disease (CMS-HCC)      Pulmonary   (+) Asthma   (+) Mild intermittent asthma without complication (HHS-HCC)      Neuro   (+) Acute anxiety   (+) Carpal tunnel syndrome of right wrist   (+) Cervical radiculopathy   (+) Cubital tunnel syndrome on right   (+) Depression   (+) Entrapment of right ulnar nerve   (+) Lesion of ulnar nerve   (+) Lumbar radiculopathy   (+) PTSD (post-traumatic stress disorder)   (+) Sciatica, left side      GI   (+) Gastroesophageal reflux disease      /Renal   (+) Benign localized prostatic hyperplasia with lower urinary tract symptoms (LUTS)      Liver   (+) Chronic calculous cholecystitis   (+) Steatosis of liver      Endocrine   (+) Diabetic neuropathy (Multi)   (+) Type 2 diabetes mellitus with obesity (Multi)      Musculoskeletal   (+) Carpal tunnel syndrome of right wrist   (+) Disc degeneration, lumbar   (+) Lumbar herniated disc   (+) Osteoarthritis of left knee      ID   (+) Candidiasis of mouth   (+) Samm's gangrene (Multi)   (+) 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 Left ventricular systolic function is normal with a 60-65% estimated ejection fraction.   2. No evidence of mitral valve regurgitation.   3. RVSP within normal limits.   4. Trace tricuspid regurgitation is visualized.    Clinical information reviewed:    Allergies                NPO Detail:  No data recorded     Physical Exam    Airway  Mallampati: II  TM distance: >3 FB  Neck ROM: full     Cardiovascular    Dental    Pulmonary    Abdominal  "     Other findings: Edentulous        Anesthesia Plan    History of general anesthesia?: yes  History of complications of general anesthesia?: no    ASA 4 - emergent     general     intravenous induction   Anesthetic plan and risks discussed with patient.    Plan discussed with CRNA.

## 2025-05-06 NOTE — PROGRESS NOTES
Spiritual Care Visit  Spiritual Care Request    Reason for Visit:  Routine Visit: Introduction     Request Received From:       Focus of Care:  Visited With: Patient         Refer to :          Spiritual Care Assessment    Spiritual Assessment:                      Care Provided:       Sense of Community and or Sabianism Affiliation:  Sabianism   Values/Beliefs  Spiritual Requests During Hospitalization: Michael asked for a blessing but no sacraments from the [employer].     Addressed Needs/Concerns and/or Janel Through:     Sacramental Encounters  Communion: Does not want communion  Communion Given Indicator: No  Sacrament of Sick-Anointing: Patient declined anointing    Outcome:        Advance Directives:         Spiritual Care Annotation    Annotation:  Michael asked for  a blessing today but no sacraments from the [employer].  David Campbell

## 2025-05-06 NOTE — PROGRESS NOTES
"Critical Care Daily Progress Notes        Subjective   Patient is a 59 y.o. male admitted on 5/5/2025  3:28 PM with the following indication(s) for ICU care: Septic shock, 40 years gangrene.     Interval History:   Orlin Laguerre is a 59 y.o. 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 lightheaded and \"face planted\" on ground. Then walked to ED. Had been feeling lightheaded throughout the day. Also reports perineal wound - states boil appeared last Friday that was painful and very hard. Utilized a clean insulin syringe that he had to jimenez it and reports it has been draining a thick green malodorous drainage since. Otherwise he has felt ok. Denies fever or chills.      In ED CT head and C spine were completed and negative. CT Pelvis showing \"gas-forming infection right perineum. Correlate with necrotizing fasciitis\". Labs significant for SRINIVAS with Cr 1.92 (baseline 0.7), Na low at 128, K 2.0, WBCs 19.7. BC were obtained and the patient was started on clindamycin, vancomycin, and zosyn. Case was discussed from ED with general surgery on call Dr. Licona who is taking to OR from ED. Also with urologist Dr. Jean.      Interval ICU Events:     5/6/2025: Patient is on 0.1 MCG per KG per minute of Levophed with a goal to maintain MAP more than 60.  Extensively counseled the patient regarding the need to stay in the hospital and had a discussion with the patient niece at bedside    Scheduled Medications:   Scheduled Medications[1]     Continuous Medications:   Continuous Medications[2]     PRN Medications:   PRN Medications[3]    Objective   Vitals:  Most Recent:  Vitals:    05/06/25 1121   BP:  "   Pulse: 81   Resp: 16   Temp:    SpO2: 100%       24hr Min/Max:  Temp  Min: 36.3 °C (97.3 °F)  Max: 36.3 °C (97.3 °F)  Pulse  Min: 74  Max: 109  BP  Min: 62/43  Max: 182/154  Resp  Min: 10  Max: 31  SpO2  Min: 97 %  Max: 100 %    LDA:  Arterial Line 05/06/25 Left Radial (Active)   Placement Date/Time: 05/06/25 (c) 0421   Size: 20 G  Orientation: Left  Location: Radial  Securement Method: Taped  Patient Tolerance: Tolerated well   Number of days: 0       Urethral Catheter Non-latex 16 Fr. (Active)   Placement Date/Time: 05/06/25 0529   Placed by: ENRIKE LAGUNAS  Hand Hygiene Completed: Yes  Catheter Type: Non-latex  Tube Size (Fr.): 16 Fr.  Catheter Balloon Size: 5 mL  Urine Returned: Yes   Number of days: 0       Fecal Management Rectal tube with balloon (Active)   Placement Date/Time: 05/06/25 0532   Placed by: ENRIKE LAGUNAS  Hand Hygiene Completed: Yes  Fecal Management Tube Type: Rectal tube with balloon   Number of days: 0         Vent settings:       Hemodynamic parameters for last 24 hours:       I/O:    Intake/Output Summary (Last 24 hours) at 5/6/2025 1128  Last data filed at 5/6/2025 1000  Gross per 24 hour   Intake 5569.33 ml   Output 1220 ml   Net 4349.33 ml       Physical Exam:   Physical Exam   Vitals and nursing note reviewed.   Constitutional:       General: He is awake. He is not in acute distress.     Appearance: He is not toxic-appearing.   HENT:      Head: Normocephalic.      Mouth/Throat:      Mouth: Mucous membranes are dry.   Eyes:      Extraocular Movements: Extraocular movements intact.      Pupils: Pupils are equal, round, and reactive to light.   Cardiovascular:      Rate and Rhythm: Normal rate and regular rhythm.      Pulses: Normal pulses.      Heart sounds: Normal heart sounds. No murmur heard.  Pulmonary:      Effort: Pulmonary effort is normal. No respiratory distress.      Breath sounds: No wheezing, rhonchi or rales.   Abdominal:      General: Abdomen is flat. Bowel sounds  are normal. There is no distension.      Palpations: Abdomen is soft.      Tenderness: There is no abdominal tenderness. There is no guarding.   Genitourinary:          Comments: Open excised area postsurgery on right side of perineum  Musculoskeletal:      Cervical back: Neck supple.      Right lower leg: No edema.      Left lower leg: No edema.   Skin:     General: Skin is warm and dry.      Capillary Refill: Capillary refill takes less than 2 seconds.      Coloration: Skin is not jaundiced.      Findings: No rash.   Neurological:      General: No focal deficit present.      Mental Status: He is alert and oriented to person, place, and time.   Psychiatric:         Mood and Affect: Mood normal.         Behavior: Behavior is cooperative.     Lab/Radiology/Diagnostic Review:  Results for orders placed or performed during the hospital encounter of 05/05/25 (from the past 24 hours)   ECG 12 lead   Result Value Ref Range    Ventricular Rate 104 BPM    Atrial Rate 104 BPM    OR Interval 168 ms    QRS Duration 94 ms    QT Interval 338 ms    QTC Calculation(Bazett) 444 ms    P Axis 94 degrees    R Axis 29 degrees    T Axis 82 degrees    QRS Count 17 beats    Q Onset 208 ms    P Onset 124 ms    P Offset 158 ms    T Offset 377 ms    QTC Fredericia 406 ms   POCT GLUCOSE   Result Value Ref Range    POCT Glucose 242 (H) 74 - 99 mg/dL   CBC and Auto Differential   Result Value Ref Range    WBC 19.7 (H) 4.4 - 11.3 x10*3/uL    nRBC 0.0 0.0 - 0.0 /100 WBCs    RBC 4.86 4.50 - 5.90 x10*6/uL    Hemoglobin 15.1 13.5 - 17.5 g/dL    Hematocrit 41.7 41.0 - 52.0 %    MCV 86 80 - 100 fL    MCH 31.1 26.0 - 34.0 pg    MCHC 36.2 (H) 32.0 - 36.0 g/dL    RDW 12.5 11.5 - 14.5 %    Platelets 184 150 - 450 x10*3/uL    Neutrophils % 89.3 40.0 - 80.0 %    Immature Granulocytes %, Automated 3.5 (H) 0.0 - 0.9 %    Lymphocytes % 3.2 13.0 - 44.0 %    Monocytes % 2.6 2.0 - 10.0 %    Eosinophils % 1.0 0.0 - 6.0 %    Basophils % 0.4 0.0 - 2.0 %     Neutrophils Absolute 17.61 (H) 1.20 - 7.70 x10*3/uL    Immature Granulocytes Absolute, Automated 0.68 0.00 - 0.70 x10*3/uL    Lymphocytes Absolute 0.63 (L) 1.20 - 4.80 x10*3/uL    Monocytes Absolute 0.51 0.10 - 1.00 x10*3/uL    Eosinophils Absolute 0.19 0.00 - 0.70 x10*3/uL    Basophils Absolute 0.08 0.00 - 0.10 x10*3/uL   Comprehensive Metabolic Panel   Result Value Ref Range    Glucose 247 (H) 74 - 99 mg/dL    Sodium 128 (L) 136 - 145 mmol/L    Potassium 3.0 (L) 3.5 - 5.3 mmol/L    Chloride 93 (L) 98 - 107 mmol/L    Bicarbonate 23 21 - 32 mmol/L    Anion Gap 15 10 - 20 mmol/L    Urea Nitrogen 25 (H) 6 - 23 mg/dL    Creatinine 1.92 (H) 0.50 - 1.30 mg/dL    eGFR 40 (L) >60 mL/min/1.73m*2    Calcium 8.5 (L) 8.6 - 10.3 mg/dL    Albumin 3.7 3.4 - 5.0 g/dL    Alkaline Phosphatase 100 33 - 120 U/L    Total Protein 6.5 6.4 - 8.2 g/dL    AST 28 9 - 39 U/L    Bilirubin, Total 0.7 0.0 - 1.2 mg/dL    ALT 37 10 - 52 U/L   Magnesium   Result Value Ref Range    Magnesium 2.06 1.60 - 2.40 mg/dL   Troponin I, High Sensitivity, Initial   Result Value Ref Range    Troponin I, High Sensitivity 5 0 - 20 ng/L   Lactate   Result Value Ref Range    Lactate 2.5 (H) 0.4 - 2.0 mmol/L   Blood Culture    Specimen: Peripheral Venipuncture; Blood culture   Result Value Ref Range    Blood Culture Loaded on Instrument - Culture in progress    Blood Culture    Specimen: Peripheral Venipuncture; Blood culture   Result Value Ref Range    Blood Culture Loaded on Instrument - Culture in progress    Troponin, High Sensitivity, 1 Hour   Result Value Ref Range    Troponin I, High Sensitivity 4 0 - 20 ng/L   Lactate   Result Value Ref Range    Lactate 1.2 0.4 - 2.0 mmol/L   Urinalysis with Reflex Culture and Microscopic   Result Value Ref Range    Color, Urine Light-Yellow Light-Yellow, Yellow, Dark-Yellow    Appearance, Urine Clear Clear    Specific Gravity, Urine <1.005 (A) 1.005 - 1.035    pH, Urine 5.5 5.0, 5.5, 6.0, 6.5, 7.0, 7.5, 8.0    Protein,  Urine 10 (TRACE) NEGATIVE, 10 (TRACE), 20 (TRACE) mg/dL    Glucose, Urine OVER (4+) (A) Normal mg/dL    Blood, Urine NEGATIVE NEGATIVE mg/dL    Ketones, Urine NEGATIVE NEGATIVE mg/dL    Bilirubin, Urine NEGATIVE NEGATIVE mg/dL    Urobilinogen, Urine Normal Normal mg/dL    Nitrite, Urine NEGATIVE NEGATIVE    Leukocyte Esterase, Urine NEGATIVE NEGATIVE   Extra Urine Gray Tube   Result Value Ref Range    Extra Tube Hold for add-ons.    Urinalysis Microscopic   Result Value Ref Range    WBC, Urine 11-20 (A) 1-5, NONE /HPF    RBC, Urine NONE NONE, 1-2, 3-5 /HPF    Squamous Epithelial Cells, Urine 1-9 (SPARSE) Reference range not established. /HPF    Bacteria, Urine 1+ (A) NONE SEEN /HPF   Vancomycin   Result Value Ref Range    Vancomycin 31.1 (H) 5.0 - 20.0 ug/mL   CBC   Result Value Ref Range    WBC 18.9 (H) 4.4 - 11.3 x10*3/uL    nRBC 0.0 0.0 - 0.0 /100 WBCs    RBC 4.33 (L) 4.50 - 5.90 x10*6/uL    Hemoglobin 13.3 (L) 13.5 - 17.5 g/dL    Hematocrit 37.8 (L) 41.0 - 52.0 %    MCV 87 80 - 100 fL    MCH 30.7 26.0 - 34.0 pg    MCHC 35.2 32.0 - 36.0 g/dL    RDW 12.7 11.5 - 14.5 %    Platelets 196 150 - 450 x10*3/uL   Comprehensive Metabolic Panel   Result Value Ref Range    Glucose 176 (H) 74 - 99 mg/dL    Sodium 133 (L) 136 - 145 mmol/L    Potassium 3.4 (L) 3.5 - 5.3 mmol/L    Chloride 102 98 - 107 mmol/L    Bicarbonate 17 (L) 21 - 32 mmol/L    Anion Gap 17 10 - 20 mmol/L    Urea Nitrogen 23 6 - 23 mg/dL    Creatinine 1.24 0.50 - 1.30 mg/dL    eGFR 67 >60 mL/min/1.73m*2    Calcium 7.9 (L) 8.6 - 10.3 mg/dL    Albumin 3.2 (L) 3.4 - 5.0 g/dL    Alkaline Phosphatase 99 33 - 120 U/L    Total Protein 5.7 (L) 6.4 - 8.2 g/dL    AST 17 9 - 39 U/L    Bilirubin, Total 0.8 0.0 - 1.2 mg/dL    ALT 24 10 - 52 U/L   Magnesium   Result Value Ref Range    Magnesium 1.89 1.60 - 2.40 mg/dL   Phosphorus   Result Value Ref Range    Phosphorus 3.7 2.5 - 4.9 mg/dL   POCT GLUCOSE   Result Value Ref Range    POCT Glucose 193 (H) 74 - 99 mg/dL    Lactate   Result Value Ref Range    Lactate 1.3 0.4 - 2.0 mmol/L   POCT GLUCOSE   Result Value Ref Range    POCT Glucose 158 (H) 74 - 99 mg/dL   Blood Gas Arterial Full Panel   Result Value Ref Range    POCT pH, Arterial 7.41 7.38 - 7.42 pH    POCT pCO2, Arterial 28 (L) 38 - 42 mm Hg    POCT pO2, Arterial 109 (H) 85 - 95 mm Hg    POCT SO2, Arterial 99 94 - 100 %    POCT Oxy Hemoglobin, Arterial 97.2 94.0 - 98.0 %    POCT Hematocrit Calculated, Arterial 41.0 41.0 - 52.0 %    POCT Sodium, Arterial 130 (L) 136 - 145 mmol/L    POCT Potassium, Arterial 3.0 (L) 3.5 - 5.3 mmol/L    POCT Chloride, Arterial 104 98 - 107 mmol/L    POCT Ionized Calcium, Arterial 1.10 1.10 - 1.33 mmol/L    POCT Glucose, Arterial 153 (H) 74 - 99 mg/dL    POCT Lactate, Arterial 1.3 0.4 - 2.0 mmol/L    POCT Base Excess, Arterial -5.5 (L) -2.0 - 3.0 mmol/L    POCT HCO3 Calculated, Arterial 17.7 (L) 22.0 - 26.0 mmol/L    POCT Hemoglobin, Arterial 13.6 13.5 - 17.5 g/dL    POCT Anion Gap, Arterial 11 10 - 25 mmo/L    Patient Temperature 37.0 degrees Celsius    FiO2 28 %    Site of Arterial Puncture Arterial Line      *Note: Due to a large number of results and/or encounters for the requested time period, some results have not been displayed. A complete set of results can be found in Results Review.     Imaging  CT pelvis w IV contrast  Result Date: 5/5/2025  1.  Gas-forming infection right perineum. Correlate with necrotizing fasciitis.   MACRO: None   Signed by: Christiano Young 5/5/2025 5:59 PM Dictation workstation:   [id number]    CT head wo IV contrast  Result Date: 5/5/2025  No acute intracranial findings   No acute cervical spine findings.   Signed by: Manny Childs 5/5/2025 5:50 PM Dictation workstation:   [id number]    CT cervical spine wo IV contrast  Result Date: 5/5/2025  No acute intracranial findings   No acute cervical spine findings.   Signed by: Manny Childs 5/5/2025 5:50 PM Dictation workstation:   [id number]    XR chest  1 view  Result Date: 5/5/2025  1.  No evidence of acute cardiopulmonary process.       MACRO: None   Signed by: Marcos Ceja 5/5/2025 4:34 PM Dictation workstation:   [id number]      Cardiology, Vascular, and Other Imaging  ECG 12 lead  Result Date: 5/6/2025  Sinus tachycardia Otherwise normal ECG When compared with ECG of 12-AUG-2010 21:43, QT has lengthened    FL pain management  Result Date: 5/2/2025  These images are not reportable by radiology and will not be interpreted by  Radiologists.                       Assessment/Plan   Assessment & Plan  Samm's gangrene (Multi)    Syncope and collapse    Acute kidney injury    Hypokalemia    Closed head injury    Leukocytosis      I am currently managing this critically ill patient for the following problems:     Neuro/Psych/Pain Ctrl/Sedation:  #Hx Anxiety, Depression  #Hx Cervicalgia, Cervical/Lumbar disc herniations - s/p cervical epidural steroid injection 5/2/25  -Pain Control: Tylenol PRN available. Will likely need escalated post operatively.   -Home Cymbalta, Seroquel dosing on hold d/t NPO for urgent OR this am. Will resume post op.   -Holding home Naproxen, Ibuprofen, Methocarbamol   -Sedation: None currently  -CAM Assessment every shift, Promote Sleep/wake hygiene     Respiratory/ENT:  #Tobacco Use Disorder - reports 1.5 PPD  CXR 5/5: No acute cardiopulmonary process  -Stable on room air prior to OR  -Goal maintain SPO2 > 92%  -Promote Pulm Hygiene Daily   -Will need further education resources for smoking cessation     Cardiovascular:  #Shock - likely septic in nature  #Hx HTN, HLD, CAD   -Currently on Levophed gtt d/t persistent hypotension post sepsis IVF bolus. Will wean as tolerated to maintain MAP > 65   -Holding home antiHTN regimen while on pressors: atenolol-chlorthalidone, losartan   -Continue home statin dosing  -Continuous Cardiac Monitoring   5/6/2025: Source control was started this morning by the general surgery   Patient is on 0.1 MCG  "per KG per minute of Levophed with a goal to maintain MAP more than 60.    Extensively counseled the patient regarding the need to stay in the hospital and had a discussion with the patient niece at bedside  Patient started on clindamycin and Zosyn  ID consulted     GI:  No Acute Issues  Diet: NPO for OR. Resume Diabetic, Heart Healthy diet post op.   Ulcer Prophylaxis: on home PPI, continued  Bowel Prophylaxis: Miralax PRN available     Renal/Volume Status (Intra & Extravascular):  #SRINIVAS - Cr 1.9, baseline appears to be around 0.7  #Hypokalemia, Hyponatremia - Na 128, K 3.0  #Hx BPH  #Nec Fac concern to perineum  -Fluid resuscitated in ED: Given 4.5 L. Now on vasopressor support  -Goal to maintain UOP 0.5-1.0 mL/kg/hr, notify provider if less than ideal  -Urology, general surgery following  -Flomax on hold for now  -Given 40 meq IV K in ED, will repeat labs on arrival  -Daily BMP, Mag & Phos, Replete electrolytes as indicated   5/6/2025: Surgery recommendation \"Due to tunneling into the posterior scrotum and into the right inguinal canal, patient will require a multi-disciplinary approach for further debridement now that source control has been initiated. Low threshold to transfer patient to [facility].   Recommend urology consult for further evaluation to be sure the tunnelling into the right inguinal canal is not compromising the spermatic cord structures or the right testicle itself.\"   Urology will evaluate today  Meanwhile will initiate the process of transfer to surgical ICU at [facility] for plastic surgery evaluation and multidisciplinary team    Endocrine  #T2DM  Last hbgA1c 3/2024 8.9  -Will add POCT Glucose on arrival with SSI coverage  -Holding home Lantus for now as patient is NPO for procedure     Infectious Disease:  #Concern for necrotizing fasciitis  #Samm's gangrene  CT pelvis 5/5: Gas forming infection of right perineum, correlate with nec fasc  -Started on Zosyn, Vancomycin, Clindamycin in " ED. Will continue.  -Gen surgery taking to OR this am for I and D, washout for urgent source control  -Monitor for SIRS  5/6/2025: Patient is on 0.1 MCG per KG per minute of Levophed with a goal to maintain MAP more than 60.    Extensively counseled the patient regarding the need to stay in the hospital and had a discussion with the patient niece at bedside  Meanwhile initiate the process of transfer to surgical ICU at [facility] predominantly for plastic surgery evaluation and multidisciplinary team     Heme/Onc:  No Acute Issues  -Monitor for s/s of anemia  -CBC daily, Transfuse for Hgb < 7      SKIN/MSK:  #Perineal Wound  -May require wound consultation post op, will reassess on arrival   -Padded pressure points  -Skin Protocol per ICU      Ethics/Code Status:  FULL CODE  Patient is alert oriented x 4 unconfirmed about the full code  Patient also notified her niece Luz Elena will be the person who can make medical decisions on her behalf recommend he can make his own medical decisions     :  DVT Prophylaxis: None, awaiting procedure this am   GI Prophylaxis: PPI  Bowel Regimen: Miralax PRN  Diet: NPO  CVC: None  Pati: Yes  Schmid: yes  Restraints: None  Dispo: ICU    I spent 105 minutes of cumulative critical care time with the patient.  Greater than 50% of that time was spent in the direct collaboration and or coordination of care of the patient.     Dragon dictation software was used to dictate this note and thus there may be minor errors in translation/transcription including garbled speech or misspellings. Please contact for clarification if needed.          [1] [Held by provider] aspirin, 81 mg, oral, Daily  clindamycin, 900 mg, intravenous, q8h  [Held by provider] DULoxetine, 60 mg, oral, Daily  [Held by provider] empagliflozin, 25 mg, oral, Daily  insulin lispro, 0-15 Units, subcutaneous, q4h  [Held by provider] losartan, 25 mg, oral, Daily  midodrine, 10 mg, oral, TID  [START ON 5/7/2025]  pantoprazole, 40 mg, oral, Daily  [START ON 5/7/2025] phenytoin ER, 100 mg, oral, Daily  piperacillin-tazobactam, 3.375 g, intravenous, q6h  QUEtiapine, 100 mg, oral, Nightly  [START ON 5/7/2025] rosuvastatin, 40 mg, oral, Daily  sodium hypochlorite, , irrigation, Daily  [Held by provider] tamsulosin, 0.8 mg, oral, Daily    [2] norepinephrine, 0-0.2 mcg/kg/min, Last Rate: 0.09 mcg/kg/min (05/06/25 1121)    [3] PRN medications: acetaminophen, dextrose, dextrose, glucagon, glucagon, HYDROmorphone, polyethylene glycol, vancomycin

## 2025-05-06 NOTE — CONSULTS
"INFECTIOUS DISEASES CONSULTATION NOTE      Referred by Vito Wallace MD    Reason For Consult  Necrotizing fasciitis    History Of Present Illness  Orlin Laguerre \"Michael\" is a 59 y.o. male with diabetes mellitus.  I was involved in his care in this hospital in 2017 when he had perineal necrotizing fasciitis which ultimately required a diverting colostomy.  Cultures grew mixed elijah, including VRE, and he was ultimately treated with a long course of Unasyn and daptomycin with good results.  Antibiotic therapy was stopped, wounds continued to heal, and he ultimately underwent takedown of his colostomy.    Several days before admission he noted a hard, painful boil in the perineum and he lanced it with a clean insulin syringe producing a large amount of malodor and purulent drainage.  He states that it continued to drain purulent fluid, and he also noted that it \"drained air.\"  He became increasingly symptomatic and presented to the emergency room in the evening correction in the afternoon of 5/5 and was diagnosed with necrotizing fasciitis.  He was taken to surgery last night and underwent wide debridement.  He is now on norepinephrine in the intensive care unit receiving empiric therapy with vancomycin, clindamycin and Zosyn, and he is awaiting a bed at [facility].    He denies significant pain, and comments that this presentation is different from that of 2017 as he recalls having severe pain at that time.     Past Medical History  Diabetes mellitus  Dyslipidemia  Peripheral vascular disease  CHF/pulmonary edema  Vasovagal syncope  Surgical History  Chest tube  Cholecystectomy  Prior episode of necrotizing fasciitis and colostomy as noted above.    Social History  Tobacco use: Smokes 1.5 packs of homemade cigarettes per day  Alcohol use: Endorses occasional social alcohol    Family History  Not pertinent to the current question    Allergies  Varenicline, Metformin hcl, and Tramadol     Review of " Systems  Detailed review of systems completed.  No significant additional positives beyond what is mentioned above    Physical Exam  Vital signs:  Visit Vitals  BP (!) 89/48   Pulse 91   Temp 36.3 °C (97.3 °F) (Temporal)   Resp 23   Awake and alert sitting upright in bed in the ICU.  On norepinephrine infusion  General: Alert, not toxic  HEENT:  No scleral icterus or conjunctival suffusion, oral mucosa moist  Nodes:  Negative  Lungs:  Clear to auscultation  Heart:  S1, S2 normal, no pathologic murmur appreciated  Abdomen:  Soft, nontender. No palpable organs or masses  Back:  No spinal or CVA tenderness  Genitalia: Phallus unremarkable.  Schmid catheter in place.  Perineal dressing not removed.  Extremities:  No cords, phlebitis, cellulitis  Neurologic:  Alert.  Grossly non-focal.    Skin: No dermatitis    Relevant Results  Results from last 72 hours   Lab Units 05/06/25  0728 05/05/25  1552   WBC AUTO x10*3/uL 18.9* 19.7*   HEMOGLOBIN g/dL 13.3* 15.1   HEMATOCRIT % 37.8* 41.7   PLATELETS AUTO x10*3/uL 196 184   NEUTROS PCT AUTO %  --  89.3   LYMPHS PCT AUTO %  --  3.2   MONOS PCT AUTO %  --  2.6   EOS PCT AUTO %  --  1.0   Creatinine (baseline 0.8): 1.92---> 1.24  Results from last 72 hours   Lab Units 05/06/25  0728   CREATININE mg/dL 1.24   ANION GAP mmol/L 17   EGFR mL/min/1.73m*2 67     Results from last 72 hours   Lab Units 05/06/25  0728   AST U/L 17   ALT U/L 24   ALK PHOS U/L 99   BILIRUBIN TOTAL mg/dL 0.8     Urinalysis: Mild pyuria  Microbiology:  Blood (5/5): Negative X2  Urine: Pending  Wound cultures from the OR (5/6): Pending X2  Imaging:  CXR images personally reviewed: No pulmonary infiltrate  CT pelvis: Gas-forming infection in the right perineum      ASSESSMENT:  Necrotizing fasciitis  Patient was taken to the OR early this morning for urgent debridement of necrotizing perineal fasciitis.  Examination appears stable at this time and he has minimal pain and remains on a norepinephrine infusion.  He  is awaiting transfer to [facility].  He should be maintained on broad-spectrum antibiotic therapy which covers enteric elijah, streptococci, staphylococci including MRSA, VRE, and anaerobes.  In addition, it is prudent to treat him with an antibiotic which works by impairing protein synthesis, in order to decrease production of toxins sometimes associated with this entity.  In my opinion, that can most efficiently be accomplished by treating him with a combination of Zosyn and linezolid.    Acute kidney injury  Improving.  Antibiotic dosing will take his current creatinine clearance into account    Diabetes mellitus    PLANS:  -   Continue Zosyn 3.375 g IV every 6 hours  -   Stop clindamycin  -   Stop vancomycin  -   Linezolid 600 mg IV every 12 hours  -   Await further observation and incubation of cultures  -   Await availability of bed at [facility]     THANK YOU FOR ASKING ME TO ASSIST YOU IN THE CARE OF YOUR PATIENT    Kraig Rodriguez MD  [facility] [employer]  Office:  [phone]

## 2025-05-06 NOTE — PROGRESS NOTES
Vancomycin Dosing by Pharmacy- [facility]    Orlin Laguerre is a 59 y.o. year old male who Pharmacy has been consulted for vancomycin dosing for cellulitis/skin and soft tissue infection. Based on the patient's indication and renal status this patient will be dosed based on a target level of SSTI/UTI: 10-15 mcg/mL    Patient is currently in SRINIVAS    Last vancomycin dose (incl. date and time): 1250 mg on 5/6 at 0330    Vancomycin level (incl. date and time): 31.1 mcg/mL on 5/6 at 0730    Lab Results   Component Value Date    VANCORANDOM 31.1 (H) 05/06/2025       Visit Vitals  BP (!) 182/154   Pulse 90   Temp 36.3 °C (97.3 °F) (Temporal)   Resp 10           Lab Results   Component Value Date    CREATININE 1.24 05/06/2025    CREATININE 1.92 (H) 05/05/2025    CREATININE 0.79 01/03/2025    CREATININE 0.80 03/11/2024       I/O last 3 completed shifts:  In: 5569.3 (64.6 mL/kg) [IV Piggyback:5569.3]  Out: 875 (10.2 mL/kg) [Urine:875 (0.3 mL/kg/hr)]  Weight: 86.2 kg     Assessment/Plan     Level is above target trough goal.   hold vancomycin.  Random level within 24 hours will be obtained on 5/7 at 0500. May be obtained sooner if clinically indicated.   Will continue to monitor renal function daily while on vancomycin and order serum creatinine at least every 48 hours if not already ordered.  Follow for continued vancomycin needs, clinical response, and signs/symptoms of toxicity.     Emanuel Leahy, PharmD

## 2025-05-06 NOTE — OP NOTE
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]     Procedures  INCISION AND DRAINAGE, PERINEUM  36797 - AL I&D VULVA/PERINEAL ABSCESS      Surgeons      * Maribell Licona - Primary    Resident/Fellow/Other Assistant:  Surgeons and Role:  * No surgeons found with a matching role *    Staff:   Circulator: Vivian Duong Person: Claude  Surgical Assistant: Payton    Anesthesia Staff: Anesthesiologist: Mika Beck MD  CRNA: YAIMA Marin-CRNA    Procedure Summary  Anesthesia: General  ASA: IV  Estimated Blood Loss: 50 mL  Intra-op Medications:   Administrations occurring from 0400 to 0520 on 25:   Medication Name Total Dose   sodium hypochlorite (Dakin's Quarter Strength) 0.125 % external solution 473 mL   etomidate (Amidate) injection 14 mg   fentaNYL (Sublimaze) injection 50 mcg/mL 25 mcg   LR bolus Cannot be calculated   midazolam (Versed) 1 mg/1 mL 2 mg   norepinephrine (Levophed) 8 mg in dextrose 5% 250 mL (0.032 mg/mL) infusion (premix) 0 mg   rocuronium (ZeMuron) 50 mg/5 mL injection 50 mg   sodium chloride 0.9 % bolus 1,000 mL Cannot be calculated   vasopressin 20 units/mL 3 Units              Anesthesia Record               Intraprocedure I/O Totals          Intake    LR bolus 500.00 mL    Norepinephrine Drip 0.00 mL    The total shown is the total volume documented since Anesthesia Start was filed.    Total Intake 500 mL          Specimen:   ID Type Source Tests Collected by Time   1 : soft tissue excision of right perineum Tissue SOFT TISSUE RESECTION SURGICAL PATHOLOGY EXAM Maribell Licona MD 2025 0440   A : perineal abcess Swab ABSCESS TISSUE/WOUND CULTURE/SMEAR Maribell Licona MD 2025 " "0443   B : Perineal abcess 2 Swab ABSCESS TISSUE/WOUND CULTURE/SMEAR Maribell Licona MD 5/6/2025 0443                 Drains and/or Catheters:   Fecal Management Rectal tube with balloon (Active)   Site Assessment Unable to assess 05/06/25 0600       Urethral Catheter Non-latex 16 Fr. (Active)   Site Assessment Clean;Skin intact 05/06/25 0600   Collection Container Standard drainage bag 05/06/25 0600   Securement Method Securing device (Describe) 05/06/25 0600   Reason for Continuing Urinary Catheterization surgical procedures: urological/gynecological, pelvic oncology, anal, prolonged surgical procedure 05/06/25 0600   Output (mL) 750 mL 05/06/25 0600       Tourniquet Times:         Implants:     Findings: Samm's gangrene tunneling into scrotal septum, tunneling up into right inguinal canal; dishwater fluid, pus and necrotic tissue    Indications: Orlin Laguerre \"Jeri" is an 59 y.o. 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 Presented to the ED in septic shock, found to have NSTI of right perineum extending into scrotum.  Urology consulted and recommended transfer to tertiary center. Patient continued to require pressors with worsening wound.  Urology unable to operate.  General Surgery to take patient for Incision, Drainage and Debridement of Perineum NSTI urgently.    The patient was seen in the preoperative area. The risks, benefits, complications, treatment options, non-operative alternatives, expected recovery and outcomes were discussed with the patient.     Patient consented to undergo emergent I&D with perineal debridement, possible scrotal debridement.  They understood the high likelihood of ICU admission postoperatively, prolonged ventilation and even possible open abdomen.  It was reiterated that patient remains full code during the procedure.     Additionally, the possibilities of reaction to medication, pulmonary aspiration, injury to surrounding structures, " bleeding, recurrent infection, the need for additional procedures, failure to diagnose a condition, and creating a complication requiring transfusion or operation were discussed with the patient. The patient concurred with the proposed plan, giving informed consent.  The site of surgery was properly noted/marked if necessary per policy. The patient has been actively warmed in preoperative area. Preoperative antibiotics have been ordered and given within 1 hours of incision. Venous thrombosis prophylaxis have been ordered including bilateral sequential compression devices    Procedure Details: After obtaining informed consent, the patient was brought to the OR and placed in the supine position. General Anesthesia was induced. The operative field was prepped and draped in a sterile fashion. Patient was then placed in lithotomy position with all pressure points padded. A safety time-out was performed as per institutional protocol.     Patient wound preop:          An elliptical incision was made around the necrotic ulcer in the right perineum and small skin flaps were lifted to explore the subcutaneous fat underneath. Copious dishwater opaque fluid with pus and necrosis was found and removed with cautery and blunt dissection. Hemostasis was assured with cautery.  The excision and debridement was carried down through subcutaneous tissue with involvement of fascia.  Debrided wound final dimensions were: 18cm anterior-posterior x 8cm wide x 5cm deep. There is notable tunneling up into the right inguinal canal (approx 11cm).  There is involvement of the scrotal septum and exposed dartos fascia. Thre is a small tunnel into the scrotal septum, tunneling 5cm into the scrotum with purulent fluid drained.             Wound was irrigated and hemostasis was assured with cautery.  Again the area was digitally probed with any loculations or necrotic tissue broken bluntly.  100cc murky fluid and pus ultimately drained.  2x large  kerlix soaked in Dakins were packed into the tunnelled areas and packed into the wound. This was covered with ABD pads and mesh panties.    All instruments, needles and sponge counts were correct. Patient was extubated without difficulty and transferred to ICU in imrpoved condition with pressors weaning down.    Schmid & Rectal tube placed in the OR for more accessible wound management.    Due to tunneling into the posterior scrotum and into the right inguinal canal, patient will require a multi-disciplinary approach for further debridement now that source control has been initiated.  Low threshold to transfer patient to [facility].  -Recommend urology consult for further evaluation to be sure the tunnelling into the right inguinal canal is not compromising the spermatic cord structures or the right testicle itself.  -Recommend ID consult  -Recommend plastics consult for reconstruction once infection subsides    Evidence of Infection: Yes; Pus Within the muscle/fascial layers and Purulent fluid Within the muscle/fascial layers  Complications:  None; patient tolerated the procedure well.    Disposition: ICU - extubated and stable.  Condition: downtrending pressor requirement         Task Performed by RNFA or Surgical Assistant:  No qualified resident was available to assist.     An assistant was used throughout the case and assisted in retraction, visualization, and improved the flow of the case.              Additional Details: No involvement of rectum/anus appreciated on RANDAL    Attending Attestation:     Maribell Licona  Phone Number: [phone]

## 2025-05-06 NOTE — INTERVAL H&P NOTE
The initial plan reported to General Surgery team at 1900 5/5/25 was that patient would undergo exploration of genitalia with Urologist on call for Samm's gangrene. Subsequent recommendation from Urology was for patient to be transferred for tertiary care. General Surgery continued to follow in-house with serial exams while awaiting transfer. Unable to transfer to [facility] or [facility], despite General Surgery recommendation that patient be in a facility where urology is available.     Patient continues to require pressor support in the ED, on broad spectrum antibiotics. Again verified that urology is unavailable at [facility] to take patient to the OR.  Due to urgency of patient's condition, will perform I&D of perineum with washout in the OR for urgent source control.

## 2025-05-06 NOTE — ANESTHESIA PROCEDURE NOTES
Arterial Line:    Date/Time: 5/6/2025 4:21 AM    Staffing  Performed: attending   Authorized by: Mika Beck MD    Performed by: MAXIMO Marin    An arterial line was placed. Procedure performed using surface landmarks.in the OR for the following indication(s): continuous blood pressure monitoring.    A 20 gauge (size) (length), Angiocath (type) catheter was placed into the Left radial artery, secured by tapeEvents:  patient tolerated procedure well with no complications.

## 2025-05-07 ENCOUNTER — ANESTHESIA EVENT (OUTPATIENT)
Dept: OPERATING ROOM | Facility: HOSPITAL | Age: 60
End: 2025-05-07
Payer: MEDICARE

## 2025-05-07 ENCOUNTER — APPOINTMENT (OUTPATIENT)
Dept: RADIOLOGY | Facility: HOSPITAL | Age: 60
DRG: 853 | End: 2025-05-07
Payer: MEDICARE

## 2025-05-07 ENCOUNTER — ANESTHESIA (OUTPATIENT)
Dept: OPERATING ROOM | Facility: HOSPITAL | Age: 60
End: 2025-05-07
Payer: MEDICARE

## 2025-05-07 PROBLEM — A48.0 GAS GANGRENE (MULTI): Status: ACTIVE | Noted: 2025-05-06

## 2025-05-07 LAB
ALBUMIN SERPL BCP-MCNC: 2.6 G/DL (ref 3.4–5)
ALBUMIN SERPL BCP-MCNC: 2.7 G/DL (ref 3.4–5)
ANION GAP BLDA CALCULATED.4IONS-SCNC: 11 MMO/L (ref 10–25)
ANION GAP BLDA CALCULATED.4IONS-SCNC: 8 MMO/L (ref 10–25)
ANION GAP SERPL CALC-SCNC: 13 MMOL/L (ref 10–20)
ANION GAP SERPL CALC-SCNC: 14 MMOL/L (ref 10–20)
BACTERIA UR CULT: NO GROWTH
BASE EXCESS BLDA CALC-SCNC: -4.7 MMOL/L (ref -2–3)
BASE EXCESS BLDA CALC-SCNC: -5.2 MMOL/L (ref -2–3)
BODY TEMPERATURE: 37 DEGREES CELSIUS
BODY TEMPERATURE: 37 DEGREES CELSIUS
BUN SERPL-MCNC: 22 MG/DL (ref 6–23)
BUN SERPL-MCNC: 24 MG/DL (ref 6–23)
CA-I BLDA-SCNC: 1.13 MMOL/L (ref 1.1–1.33)
CA-I BLDA-SCNC: 1.16 MMOL/L (ref 1.1–1.33)
CALCIUM SERPL-MCNC: 7.5 MG/DL (ref 8.6–10.6)
CALCIUM SERPL-MCNC: 7.7 MG/DL (ref 8.6–10.6)
CHLORIDE BLDA-SCNC: 104 MMOL/L (ref 98–107)
CHLORIDE BLDA-SCNC: 106 MMOL/L (ref 98–107)
CHLORIDE SERPL-SCNC: 103 MMOL/L (ref 98–107)
CHLORIDE SERPL-SCNC: 104 MMOL/L (ref 98–107)
CO2 SERPL-SCNC: 20 MMOL/L (ref 21–32)
CO2 SERPL-SCNC: 21 MMOL/L (ref 21–32)
CORTIS SERPL-MCNC: 13 UG/DL (ref 2.5–20)
CREAT SERPL-MCNC: 0.92 MG/DL (ref 0.5–1.3)
CREAT SERPL-MCNC: 0.95 MG/DL (ref 0.5–1.3)
EGFRCR SERPLBLD CKD-EPI 2021: >90 ML/MIN/1.73M*2
EGFRCR SERPLBLD CKD-EPI 2021: >90 ML/MIN/1.73M*2
ERYTHROCYTE [DISTWIDTH] IN BLOOD BY AUTOMATED COUNT: 12.6 % (ref 11.5–14.5)
GLUCOSE BLD MANUAL STRIP-MCNC: 132 MG/DL (ref 74–99)
GLUCOSE BLD MANUAL STRIP-MCNC: 136 MG/DL (ref 74–99)
GLUCOSE BLD MANUAL STRIP-MCNC: 140 MG/DL (ref 74–99)
GLUCOSE BLD MANUAL STRIP-MCNC: 140 MG/DL (ref 74–99)
GLUCOSE BLD MANUAL STRIP-MCNC: 149 MG/DL (ref 74–99)
GLUCOSE BLD MANUAL STRIP-MCNC: 152 MG/DL (ref 74–99)
GLUCOSE BLD MANUAL STRIP-MCNC: 153 MG/DL (ref 74–99)
GLUCOSE BLD MANUAL STRIP-MCNC: 157 MG/DL (ref 74–99)
GLUCOSE BLD MANUAL STRIP-MCNC: 161 MG/DL (ref 74–99)
GLUCOSE BLD MANUAL STRIP-MCNC: 163 MG/DL (ref 74–99)
GLUCOSE BLD MANUAL STRIP-MCNC: 167 MG/DL (ref 74–99)
GLUCOSE BLD MANUAL STRIP-MCNC: 180 MG/DL (ref 74–99)
GLUCOSE BLD MANUAL STRIP-MCNC: 191 MG/DL (ref 74–99)
GLUCOSE BLD MANUAL STRIP-MCNC: 193 MG/DL (ref 74–99)
GLUCOSE BLD MANUAL STRIP-MCNC: 195 MG/DL (ref 74–99)
GLUCOSE BLD MANUAL STRIP-MCNC: 197 MG/DL (ref 74–99)
GLUCOSE BLD MANUAL STRIP-MCNC: 207 MG/DL (ref 74–99)
GLUCOSE BLD MANUAL STRIP-MCNC: 208 MG/DL (ref 74–99)
GLUCOSE BLD MANUAL STRIP-MCNC: 209 MG/DL (ref 74–99)
GLUCOSE BLD MANUAL STRIP-MCNC: 211 MG/DL (ref 74–99)
GLUCOSE BLD MANUAL STRIP-MCNC: 222 MG/DL (ref 74–99)
GLUCOSE BLDA-MCNC: 175 MG/DL (ref 74–99)
GLUCOSE BLDA-MCNC: 181 MG/DL (ref 74–99)
GLUCOSE SERPL-MCNC: 161 MG/DL (ref 74–99)
GLUCOSE SERPL-MCNC: 174 MG/DL (ref 74–99)
HCO3 BLDA-SCNC: 20 MMOL/L (ref 22–26)
HCO3 BLDA-SCNC: 20.4 MMOL/L (ref 22–26)
HCT VFR BLD AUTO: 32.1 % (ref 41–52)
HCT VFR BLD EST: 36 % (ref 41–52)
HCT VFR BLD EST: 36 % (ref 41–52)
HGB BLD-MCNC: 11.2 G/DL (ref 13.5–17.5)
HGB BLDA-MCNC: 12 G/DL (ref 13.5–17.5)
HGB BLDA-MCNC: 12 G/DL (ref 13.5–17.5)
INHALED O2 CONCENTRATION: 21 %
INHALED O2 CONCENTRATION: 60 %
LACTATE BLDA-SCNC: 1 MMOL/L (ref 0.4–2)
LACTATE BLDA-SCNC: 1.1 MMOL/L (ref 0.4–2)
LACTATE SERPL-SCNC: 1.3 MMOL/L (ref 0.4–2)
MAGNESIUM SERPL-MCNC: 2.09 MG/DL (ref 1.6–2.4)
MCH RBC QN AUTO: 30.1 PG (ref 26–34)
MCHC RBC AUTO-ENTMCNC: 34.9 G/DL (ref 32–36)
MCV RBC AUTO: 86 FL (ref 80–100)
NRBC BLD-RTO: 0 /100 WBCS (ref 0–0)
OXYHGB MFR BLDA: 95.2 % (ref 94–98)
OXYHGB MFR BLDA: 98 % (ref 94–98)
PCO2 BLDA: 37 MM HG (ref 38–42)
PCO2 BLDA: 37 MM HG (ref 38–42)
PH BLDA: 7.34 PH (ref 7.38–7.42)
PH BLDA: 7.35 PH (ref 7.38–7.42)
PHOSPHATE SERPL-MCNC: 2.7 MG/DL (ref 2.5–4.9)
PHOSPHATE SERPL-MCNC: 2.7 MG/DL (ref 2.5–4.9)
PLATELET # BLD AUTO: 186 X10*3/UL (ref 150–450)
PO2 BLDA: 175 MM HG (ref 85–95)
PO2 BLDA: 80 MM HG (ref 85–95)
POTASSIUM BLDA-SCNC: 3.5 MMOL/L (ref 3.5–5.3)
POTASSIUM BLDA-SCNC: 3.6 MMOL/L (ref 3.5–5.3)
POTASSIUM SERPL-SCNC: 3.4 MMOL/L (ref 3.5–5.3)
POTASSIUM SERPL-SCNC: 3.6 MMOL/L (ref 3.5–5.3)
RBC # BLD AUTO: 3.72 X10*6/UL (ref 4.5–5.9)
SAO2 % BLDA: 100 % (ref 94–100)
SAO2 % BLDA: 98 % (ref 94–100)
SODIUM BLDA-SCNC: 131 MMOL/L (ref 136–145)
SODIUM BLDA-SCNC: 131 MMOL/L (ref 136–145)
SODIUM SERPL-SCNC: 134 MMOL/L (ref 136–145)
SODIUM SERPL-SCNC: 134 MMOL/L (ref 136–145)
VANCOMYCIN SERPL-MCNC: 11 UG/ML (ref 5–20)
WBC # BLD AUTO: 11.2 X10*3/UL (ref 4.4–11.3)

## 2025-05-07 PROCEDURE — 3700000001 HC GENERAL ANESTHESIA TIME - INITIAL BASE CHARGE: Performed by: SURGERY

## 2025-05-07 PROCEDURE — 2500000002 HC RX 250 W HCPCS SELF ADMINISTERED DRUGS (ALT 637 FOR MEDICARE OP, ALT 636 FOR OP/ED)

## 2025-05-07 PROCEDURE — 36620 INSERTION CATHETER ARTERY: CPT

## 2025-05-07 PROCEDURE — 2720000007 HC OR 272 NO HCPCS: Performed by: SURGERY

## 2025-05-07 PROCEDURE — 2500000002 HC RX 250 W HCPCS SELF ADMINISTERED DRUGS (ALT 637 FOR MEDICARE OP, ALT 636 FOR OP/ED): Performed by: STUDENT IN AN ORGANIZED HEALTH CARE EDUCATION/TRAINING PROGRAM

## 2025-05-07 PROCEDURE — 2500000004 HC RX 250 GENERAL PHARMACY W/ HCPCS (ALT 636 FOR OP/ED): Mod: JZ | Performed by: STUDENT IN AN ORGANIZED HEALTH CARE EDUCATION/TRAINING PROGRAM

## 2025-05-07 PROCEDURE — 2500000004 HC RX 250 GENERAL PHARMACY W/ HCPCS (ALT 636 FOR OP/ED)

## 2025-05-07 PROCEDURE — 2500000004 HC RX 250 GENERAL PHARMACY W/ HCPCS (ALT 636 FOR OP/ED): Mod: JZ | Performed by: SURGERY

## 2025-05-07 PROCEDURE — 86900 BLOOD TYPING SEROLOGIC ABO: CPT | Performed by: SURGERY

## 2025-05-07 PROCEDURE — 2500000004 HC RX 250 GENERAL PHARMACY W/ HCPCS (ALT 636 FOR OP/ED): Mod: JZ

## 2025-05-07 PROCEDURE — 2500000004 HC RX 250 GENERAL PHARMACY W/ HCPCS (ALT 636 FOR OP/ED): Mod: JZ | Performed by: NURSE PRACTITIONER

## 2025-05-07 PROCEDURE — 71045 X-RAY EXAM CHEST 1 VIEW: CPT | Performed by: STUDENT IN AN ORGANIZED HEALTH CARE EDUCATION/TRAINING PROGRAM

## 2025-05-07 PROCEDURE — 82947 ASSAY GLUCOSE BLOOD QUANT: CPT

## 2025-05-07 PROCEDURE — A11004 PR DEBRIDE NECROTIC SKIN/ TISSUE, GENIT AND PERINM: Performed by: STUDENT IN AN ORGANIZED HEALTH CARE EDUCATION/TRAINING PROGRAM

## 2025-05-07 PROCEDURE — 1100000001 HC PRIVATE ROOM DAILY

## 2025-05-07 PROCEDURE — 3600000007 HC OR TIME - EACH INCREMENTAL 1 MINUTE - PROCEDURE LEVEL TWO: Performed by: SURGERY

## 2025-05-07 PROCEDURE — 0JBB0ZZ EXCISION OF PERINEUM SUBCUTANEOUS TISSUE AND FASCIA, OPEN APPROACH: ICD-10-PCS | Performed by: SURGERY

## 2025-05-07 PROCEDURE — 82533 TOTAL CORTISOL: CPT | Performed by: SURGERY

## 2025-05-07 PROCEDURE — 2500000005 HC RX 250 GENERAL PHARMACY W/O HCPCS: Performed by: STUDENT IN AN ORGANIZED HEALTH CARE EDUCATION/TRAINING PROGRAM

## 2025-05-07 PROCEDURE — 2500000001 HC RX 250 WO HCPCS SELF ADMINISTERED DRUGS (ALT 637 FOR MEDICARE OP)

## 2025-05-07 PROCEDURE — 2500000004 HC RX 250 GENERAL PHARMACY W/ HCPCS (ALT 636 FOR OP/ED): Mod: JZ,TB

## 2025-05-07 PROCEDURE — 83735 ASSAY OF MAGNESIUM: CPT | Performed by: STUDENT IN AN ORGANIZED HEALTH CARE EDUCATION/TRAINING PROGRAM

## 2025-05-07 PROCEDURE — 2500000005 HC RX 250 GENERAL PHARMACY W/O HCPCS: Mod: JZ | Performed by: SURGERY

## 2025-05-07 PROCEDURE — 71045 X-RAY EXAM CHEST 1 VIEW: CPT

## 2025-05-07 PROCEDURE — 3700000002 HC GENERAL ANESTHESIA TIME - EACH INCREMENTAL 1 MINUTE: Performed by: SURGERY

## 2025-05-07 PROCEDURE — 99291 CRITICAL CARE FIRST HOUR: CPT | Performed by: SURGERY

## 2025-05-07 PROCEDURE — 36556 INSERT NON-TUNNEL CV CATH: CPT | Performed by: STUDENT IN AN ORGANIZED HEALTH CARE EDUCATION/TRAINING PROGRAM

## 2025-05-07 PROCEDURE — 99233 SBSQ HOSP IP/OBS HIGH 50: CPT

## 2025-05-07 PROCEDURE — 3600000002 HC OR TIME - INITIAL BASE CHARGE - PROCEDURE LEVEL TWO: Performed by: SURGERY

## 2025-05-07 PROCEDURE — 82330 ASSAY OF CALCIUM: CPT

## 2025-05-07 PROCEDURE — 2500000005 HC RX 250 GENERAL PHARMACY W/O HCPCS

## 2025-05-07 PROCEDURE — 80202 ASSAY OF VANCOMYCIN: CPT | Performed by: STUDENT IN AN ORGANIZED HEALTH CARE EDUCATION/TRAINING PROGRAM

## 2025-05-07 PROCEDURE — 37799 UNLISTED PX VASCULAR SURGERY: CPT | Performed by: SURGERY

## 2025-05-07 PROCEDURE — 85027 COMPLETE CBC AUTOMATED: CPT | Performed by: STUDENT IN AN ORGANIZED HEALTH CARE EDUCATION/TRAINING PROGRAM

## 2025-05-07 PROCEDURE — 99140 ANES COMP EMERGENCY COND: CPT | Performed by: STUDENT IN AN ORGANIZED HEALTH CARE EDUCATION/TRAINING PROGRAM

## 2025-05-07 PROCEDURE — 80069 RENAL FUNCTION PANEL: CPT | Performed by: STUDENT IN AN ORGANIZED HEALTH CARE EDUCATION/TRAINING PROGRAM

## 2025-05-07 PROCEDURE — 2500000005 HC RX 250 GENERAL PHARMACY W/O HCPCS: Performed by: NURSE PRACTITIONER

## 2025-05-07 PROCEDURE — 84132 ASSAY OF SERUM POTASSIUM: CPT | Performed by: STUDENT IN AN ORGANIZED HEALTH CARE EDUCATION/TRAINING PROGRAM

## 2025-05-07 PROCEDURE — 02HV33Z INSERTION OF INFUSION DEVICE INTO SUPERIOR VENA CAVA, PERCUTANEOUS APPROACH: ICD-10-PCS | Performed by: STUDENT IN AN ORGANIZED HEALTH CARE EDUCATION/TRAINING PROGRAM

## 2025-05-07 RX ORDER — DEXTROSE 50 % IN WATER (D50W) INTRAVENOUS SYRINGE
25
Status: DISCONTINUED | OUTPATIENT
Start: 2025-05-07 | End: 2025-05-12 | Stop reason: HOSPADM

## 2025-05-07 RX ORDER — DEXTROSE 50 % IN WATER (D50W) INTRAVENOUS SYRINGE
25
Status: DISCONTINUED | OUTPATIENT
Start: 2025-05-07 | End: 2025-05-08

## 2025-05-07 RX ORDER — DEXTROSE 50 % IN WATER (D50W) INTRAVENOUS SYRINGE
12.5
Status: DISCONTINUED | OUTPATIENT
Start: 2025-05-07 | End: 2025-05-12 | Stop reason: HOSPADM

## 2025-05-07 RX ORDER — POTASSIUM CHLORIDE 14.9 MG/ML
20 INJECTION INTRAVENOUS ONCE
Status: COMPLETED | OUTPATIENT
Start: 2025-05-07 | End: 2025-05-07

## 2025-05-07 RX ORDER — POTASSIUM CHLORIDE 20 MEQ/1
40 TABLET, EXTENDED RELEASE ORAL ONCE
Status: COMPLETED | OUTPATIENT
Start: 2025-05-07 | End: 2025-05-07

## 2025-05-07 RX ORDER — ROCURONIUM BROMIDE 10 MG/ML
INJECTION, SOLUTION INTRAVENOUS AS NEEDED
Status: DISCONTINUED | OUTPATIENT
Start: 2025-05-07 | End: 2025-05-07

## 2025-05-07 RX ORDER — DEXTROSE 50 % IN WATER (D50W) INTRAVENOUS SYRINGE
12.5
Status: DISCONTINUED | OUTPATIENT
Start: 2025-05-07 | End: 2025-05-08

## 2025-05-07 RX ORDER — HYDROMORPHONE HYDROCHLORIDE 0.2 MG/ML
0.2 INJECTION INTRAMUSCULAR; INTRAVENOUS; SUBCUTANEOUS
Status: DISCONTINUED | OUTPATIENT
Start: 2025-05-07 | End: 2025-05-08

## 2025-05-07 RX ORDER — MIDAZOLAM HYDROCHLORIDE 1 MG/ML
INJECTION INTRAMUSCULAR; INTRAVENOUS AS NEEDED
Status: DISCONTINUED | OUTPATIENT
Start: 2025-05-07 | End: 2025-05-07

## 2025-05-07 RX ORDER — SODIUM CHLORIDE 0.9 G/100ML
INJECTION, SOLUTION IRRIGATION AS NEEDED
Status: DISCONTINUED | OUTPATIENT
Start: 2025-05-07 | End: 2025-05-07 | Stop reason: HOSPADM

## 2025-05-07 RX ORDER — PROPOFOL 10 MG/ML
INJECTION, EMULSION INTRAVENOUS AS NEEDED
Status: DISCONTINUED | OUTPATIENT
Start: 2025-05-07 | End: 2025-05-07

## 2025-05-07 RX ORDER — FENTANYL CITRATE 50 UG/ML
INJECTION, SOLUTION INTRAMUSCULAR; INTRAVENOUS AS NEEDED
Status: DISCONTINUED | OUTPATIENT
Start: 2025-05-07 | End: 2025-05-07

## 2025-05-07 RX ORDER — LIDOCAINE HYDROCHLORIDE 20 MG/ML
INJECTION, SOLUTION INFILTRATION; PERINEURAL AS NEEDED
Status: DISCONTINUED | OUTPATIENT
Start: 2025-05-07 | End: 2025-05-07

## 2025-05-07 RX ORDER — ACETAMINOPHEN 10 MG/ML
1000 INJECTION, SOLUTION INTRAVENOUS EVERY 6 HOURS
Status: COMPLETED | OUTPATIENT
Start: 2025-05-07 | End: 2025-05-08

## 2025-05-07 RX ORDER — METHOCARBAMOL 100 MG/ML
1000 INJECTION, SOLUTION INTRAMUSCULAR; INTRAVENOUS EVERY 8 HOURS
Status: DISPENSED | OUTPATIENT
Start: 2025-05-07 | End: 2025-05-10

## 2025-05-07 RX ORDER — VANCOMYCIN HYDROCHLORIDE 1 G/20ML
INJECTION, POWDER, LYOPHILIZED, FOR SOLUTION INTRAVENOUS DAILY PRN
Status: DISCONTINUED | OUTPATIENT
Start: 2025-05-07 | End: 2025-05-07

## 2025-05-07 RX ADMIN — POTASSIUM CHLORIDE 20 MEQ: 14.9 INJECTION, SOLUTION INTRAVENOUS at 10:51

## 2025-05-07 RX ADMIN — HYDROMORPHONE HYDROCHLORIDE 0.2 MG: 0.2 INJECTION, SOLUTION INTRAMUSCULAR; INTRAVENOUS; SUBCUTANEOUS at 21:50

## 2025-05-07 RX ADMIN — NOREPINEPHRINE BITARTRATE 0.2 MCG/KG/MIN: 8 INJECTION, SOLUTION INTRAVENOUS at 01:32

## 2025-05-07 RX ADMIN — PIPERACILLIN SODIUM AND TAZOBACTAM SODIUM 3.38 G: 3; .375 INJECTION, SOLUTION INTRAVENOUS at 16:49

## 2025-05-07 RX ADMIN — FENTANYL CITRATE 100 MCG: 50 INJECTION, SOLUTION INTRAMUSCULAR; INTRAVENOUS at 03:19

## 2025-05-07 RX ADMIN — SODIUM CHLORIDE, SODIUM LACTATE, POTASSIUM CHLORIDE, AND CALCIUM CHLORIDE: 600; 310; 30; 20 INJECTION, SOLUTION INTRAVENOUS at 03:26

## 2025-05-07 RX ADMIN — SUGAMMADEX 200 MG: 100 INJECTION, SOLUTION INTRAVENOUS at 04:20

## 2025-05-07 RX ADMIN — PIPERACILLIN SODIUM AND TAZOBACTAM SODIUM 3.38 G: 3; .375 INJECTION, SOLUTION INTRAVENOUS at 10:45

## 2025-05-07 RX ADMIN — PIPERACILLIN SODIUM AND TAZOBACTAM SODIUM 3.38 G: 3; .375 INJECTION, SOLUTION INTRAVENOUS at 06:07

## 2025-05-07 RX ADMIN — VASOPRESSIN 0.03 UNITS/MIN: 0.2 INJECTION INTRAVENOUS at 01:33

## 2025-05-07 RX ADMIN — POTASSIUM PHOSPHATE, MONOBASIC POTASSIUM PHOSPHATE, DIBASIC 14.99 MMOL: 224; 236 INJECTION, SOLUTION, CONCENTRATE INTRAVENOUS at 01:43

## 2025-05-07 RX ADMIN — VASOPRESSIN 0.03 UNITS/MIN: 0.2 INJECTION INTRAVENOUS at 13:02

## 2025-05-07 RX ADMIN — MIDAZOLAM HYDROCHLORIDE 1 MG: 2 INJECTION, SOLUTION INTRAMUSCULAR; INTRAVENOUS at 03:25

## 2025-05-07 RX ADMIN — LIDOCAINE HYDROCHLORIDE 80 MG: 20 INJECTION, SOLUTION INFILTRATION; PERINEURAL at 03:29

## 2025-05-07 RX ADMIN — QUETIAPINE FUMARATE 100 MG: 50 TABLET ORAL at 21:51

## 2025-05-07 RX ADMIN — POTASSIUM CHLORIDE 20 MEQ: 14.9 INJECTION, SOLUTION INTRAVENOUS at 02:01

## 2025-05-07 RX ADMIN — HYDROCORTISONE SODIUM SUCCINATE 50 MG: 100 INJECTION, POWDER, FOR SOLUTION INTRAMUSCULAR; INTRAVENOUS at 01:26

## 2025-05-07 RX ADMIN — CLINDAMYCIN PHOSPHATE 900 MG: 900 INJECTION, SOLUTION INTRAVENOUS at 08:43

## 2025-05-07 RX ADMIN — VANCOMYCIN HYDROCHLORIDE 1000 MG: 1 INJECTION, SOLUTION INTRAVENOUS at 00:25

## 2025-05-07 RX ADMIN — INSULIN HUMAN 2 UNITS/HR: 1 INJECTION, SOLUTION INTRAVENOUS at 03:01

## 2025-05-07 RX ADMIN — HYDROMORPHONE HYDROCHLORIDE 0.4 MG: 0.5 INJECTION, SOLUTION INTRAMUSCULAR; INTRAVENOUS; SUBCUTANEOUS at 15:10

## 2025-05-07 RX ADMIN — NOREPINEPHRINE BITARTRATE 16 MCG: 1 INJECTION, SOLUTION, CONCENTRATE INTRAVENOUS at 03:29

## 2025-05-07 RX ADMIN — ACETAMINOPHEN 1000 MG: 1000 INJECTION, SOLUTION INTRAVENOUS at 13:16

## 2025-05-07 RX ADMIN — ENOXAPARIN SODIUM 30 MG: 100 INJECTION SUBCUTANEOUS at 08:46

## 2025-05-07 RX ADMIN — HYDROCORTISONE SODIUM SUCCINATE 50 MG: 100 INJECTION, POWDER, FOR SOLUTION INTRAMUSCULAR; INTRAVENOUS at 18:35

## 2025-05-07 RX ADMIN — VANCOMYCIN HYDROCHLORIDE 1000 MG: 1 INJECTION, SOLUTION INTRAVENOUS at 13:00

## 2025-05-07 RX ADMIN — HYDROCORTISONE SODIUM SUCCINATE 50 MG: 100 INJECTION, POWDER, FOR SOLUTION INTRAMUSCULAR; INTRAVENOUS at 06:20

## 2025-05-07 RX ADMIN — POTASSIUM CHLORIDE 40 MEQ: 1500 TABLET, EXTENDED RELEASE ORAL at 19:05

## 2025-05-07 RX ADMIN — ONDANSETRON 4 MG: 2 INJECTION, SOLUTION INTRAMUSCULAR; INTRAVENOUS at 04:29

## 2025-05-07 RX ADMIN — HYDROCORTISONE SODIUM SUCCINATE 50 MG: 100 INJECTION, POWDER, FOR SOLUTION INTRAMUSCULAR; INTRAVENOUS at 13:17

## 2025-05-07 RX ADMIN — ROCURONIUM BROMIDE 50 MG: 10 INJECTION, SOLUTION INTRAVENOUS at 03:29

## 2025-05-07 RX ADMIN — ENOXAPARIN SODIUM 30 MG: 100 INJECTION SUBCUTANEOUS at 21:52

## 2025-05-07 RX ADMIN — ACETAMINOPHEN 1000 MG: 1000 INJECTION, SOLUTION INTRAVENOUS at 18:35

## 2025-05-07 RX ADMIN — PROPOFOL 150 MG: 10 INJECTION, EMULSION INTRAVENOUS at 03:29

## 2025-05-07 RX ADMIN — CLINDAMYCIN PHOSPHATE 900 MG: 900 INJECTION, SOLUTION INTRAVENOUS at 14:45

## 2025-05-07 RX ADMIN — ACETAMINOPHEN 1000 MG: 1000 INJECTION, SOLUTION INTRAVENOUS at 06:20

## 2025-05-07 RX ADMIN — INSULIN HUMAN 2 UNITS: 100 INJECTION, SOLUTION PARENTERAL at 00:05

## 2025-05-07 RX ADMIN — Medication 21 PERCENT: at 05:00

## 2025-05-07 RX ADMIN — SODIUM CHLORIDE, SODIUM LACTATE, POTASSIUM CHLORIDE, AND CALCIUM CHLORIDE 1000 ML: .6; .31; .03; .02 INJECTION, SOLUTION INTRAVENOUS at 00:22

## 2025-05-07 RX ADMIN — METHOCARBAMOL 1000 MG: 1000 INJECTION, SOLUTION INTRAMUSCULAR; INTRAVENOUS at 14:52

## 2025-05-07 ASSESSMENT — PAIN SCALES - GENERAL
PAINLEVEL_OUTOF10: 0 - NO PAIN
PAINLEVEL_OUTOF10: 8
PAINLEVEL_OUTOF10: 0 - NO PAIN
PAINLEVEL_OUTOF10: 0 - NO PAIN
PAINLEVEL_OUTOF10: 3
PAINLEVEL_OUTOF10: 2
PAINLEVEL_OUTOF10: 3
PAINLEVEL_OUTOF10: 0 - NO PAIN
PAINLEVEL_OUTOF10: 0 - NO PAIN
PAINLEVEL_OUTOF10: 7

## 2025-05-07 ASSESSMENT — PAIN - FUNCTIONAL ASSESSMENT
PAIN_FUNCTIONAL_ASSESSMENT: 0-10

## 2025-05-07 ASSESSMENT — PAIN DESCRIPTION - LOCATION: LOCATION: PERINEUM

## 2025-05-07 NOTE — DISCHARGE SUMMARY
"Discharge Diagnosis  Samm's gangrene (Multi)    Issues Requiring Follow-Up  Samm's gangrene, Septic Shock.     Test Results Pending At Discharge  Pending Labs       Order Current Status    Surgical Pathology Exam In process    Urine Culture In process    Blood Culture Preliminary result    Blood Culture Preliminary result    Tissue/Wound Culture/Smear Preliminary result    Tissue/Wound Culture/Smear Preliminary result            Hospital Course   Patient is a 59 y.o. male admitted on 5/5/2025  3:28 PM with the following indication(s) for ICU care: Septic shock, 40 years gangrene.      Interval History:   Orlin Laguerre is a 59 y.o. 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 lightheaded and \"face planted\" on ground. Then walked to ED. Had been feeling lightheaded throughout the day. Also reports perineal wound - states boil appeared last Friday that was painful and very hard. Utilized a clean insulin syringe that he had to jimenez it and reports it has been draining a thick green malodorous drainage since. Otherwise he has felt ok. Denies fever or chills.      In ED CT head and C spine were completed and negative. CT Pelvis showing \"gas-forming infection right perineum. Correlate with necrotizing fasciitis\". Labs significant for SRINIVAS with Cr 1.92 (baseline 0.7), Na low at 128, K 2.0, WBCs 19.7. BC were obtained and the patient was started on clindamycin, vancomycin, and zosyn. Case was discussed from ED with general surgery on call Dr. Licona who is taking to OR from ED. Also with urologist Dr. Jean.      Interval ICU Events:     5/6/2025: Patient is on 0.1 MCG per KG per minute of " Levophed with a goal to maintain MAP more than 60.  Extensively counseled the patient regarding the need to stay in the hospital and had a discussion with the patient niece at bedside    I am currently managing this critically ill patient for the following problems:     Neuro/Psych/Pain Ctrl/Sedation:  #Hx Anxiety, Depression  #Hx Cervicalgia, Cervical/Lumbar disc herniations - s/p cervical epidural steroid injection 5/2/25  -Pain Control: Tylenol PRN available. Will likely need escalated post operatively.   -Home Cymbalta, Seroquel dosing on hold d/t NPO for urgent OR this am. Will resume post op.   -Holding home Naproxen, Ibuprofen, Methocarbamol   -Sedation: None currently  -CAM Assessment every shift, Promote Sleep/wake hygiene     Respiratory/ENT:  #Tobacco Use Disorder - reports 1.5 PPD  CXR 5/5: No acute cardiopulmonary process  -Stable on room air prior to OR  -Goal maintain SPO2 > 92%  -Promote Pulm Hygiene Daily   -Will need further education resources for smoking cessation     Cardiovascular:  #Shock - likely septic in nature  #Hx HTN, HLD, CAD   -Currently on Levophed gtt d/t persistent hypotension post sepsis IVF bolus. Will wean as tolerated to maintain MAP > 65   -Holding home antiHTN regimen while on pressors: atenolol-chlorthalidone, losartan   -Continue home statin dosing  -Continuous Cardiac Monitoring   5/6/2025: Source control was started this morning by the general surgery   Patient is on 0.05 MCG per KG per minute of Levophed with a goal to maintain MAP more than 60.  Levophed requirement continue to come down.   Extensively counseled the patient regarding the need to stay in the hospital and had a discussion with the patient niece at bedside  Patient started on clindamycin and Zosyn  ID consulted       GI:  No Acute Issues  Diet: NPO for OR. Resume Diabetic, Heart Healthy diet post op.   Ulcer Prophylaxis: on home PPI, continued  Bowel Prophylaxis: Miralax PRN available     Renal/Volume  "Status (Intra & Extravascular):  #SRINIVAS - Cr 1.9, baseline appears to be around 0.7  #Hypokalemia, Hyponatremia - Na 128, K 3.0  #Hx BPH  #Nec Fac concern to perineum  -Fluid resuscitated in ED: Given 4.5 L. Now on vasopressor support  -Goal to maintain UOP 0.5-1.0 mL/kg/hr, notify provider if less than ideal  -Urology, general surgery following  -Flomax on hold for now  -Given 40 meq IV K in ED, will repeat labs on arrival  -Daily BMP, Mag & Phos, Replete electrolytes as indicated   5/6/2025: Surgery recommendation \"Due to tunneling into the posterior scrotum and into the right inguinal canal, patient will require a multi-disciplinary approach for further debridement now that source control has been initiated. Low threshold to transfer patient to [facility].   Recommend urology consult for further evaluation to be sure the tunnelling into the right inguinal canal is not compromising the spermatic cord structures or the right testicle itself.\"   Urology will evaluate today  Meanwhile will initiate the process of transfer to surgical ICU at [facility] predominantly for plastic surgery evaluation and multidisciplinary team    Endocrine  #T2DM  Last hbgA1c 3/2024 8.9  -Will add POCT Glucose on arrival with SSI coverage  -Holding home Lantus for now as patient is NPO for procedure     Infectious Disease:  #Concern for necrotizing fasciitis  #Samm's gangrene  CT pelvis 5/5: Gas forming infection of right perineum, correlate with nec fasc  -Started on Zosyn, Vancomycin, Clindamycin in ED. Will continue.  -Gen surgery taking to OR this am for I and D, washout for urgent source control  -Monitor for SIRS  5/6/2025: Patient is on 0.1 MCG per KG per minute of Levophed with a goal to maintain MAP more than 60.    Extensively counseled the patient regarding the need to stay in the hospital and had a discussion with the patient niece at bedside  Meanwhile initiated the process of transfer to surgical ICU at [facility] predominantly for " multidisciplinary team on request of general surgery team     Heme/Onc:  No Acute Issues  -Monitor for s/s of anemia  -CBC daily, Transfuse for Hgb < 7      SKIN/MSK:  #Perineal Wound  -May require wound consultation post op, will reassess on arrival   -Padded pressure points  -Skin Protocol per ICU      Ethics/Code Status:  FULL CODE  Patient is alert oriented x 4 unconfirmed about the full code  Patient also notified her niece Luz Elena will be the person who can make medical decisions on her behalf recommend he can make his own medical decisions     :  DVT Prophylaxis: None, awaiting procedure this am   GI Prophylaxis: PPI  Bowel Regimen: Miralax PRN  Diet: NPO  CVC: None  Pati: Yes  Schmid: yes  Restraints: None  Dispo: [place] [facility] [place]    I spent 30 minutes of cumulative critical care time with the patient.  Greater than 50% of that time was spent in the direct collaboration and or coordination of care of the patient.      Dragon dictation software was used to dictate this note and thus there may be minor errors in translation/transcription including garbled speech or misspellings. Please contact for clarification if needed.      Pertinent Physical Exam At Time of Discharge  Physical Exam  Vitals and nursing note reviewed.   Constitutional:       General: He is awake. He is not in acute distress.     Appearance: He is not toxic-appearing.   HENT:      Head: Normocephalic.      Mouth/Throat:      Mouth: Mucous membranes are dry.   Eyes:      Extraocular Movements: Extraocular movements intact.      Pupils: Pupils are equal, round, and reactive to light.   Cardiovascular:      Rate and Rhythm: Normal rate and regular rhythm.      Pulses: Normal pulses.      Heart sounds: Normal heart sounds. No murmur heard.  Pulmonary:      Effort: Pulmonary effort is normal. No respiratory distress.      Breath sounds: No wheezing, rhonchi or rales.   Abdominal:      General: Abdomen is flat. Bowel sounds  "are normal. There is no distension.      Palpations: Abdomen is soft.      Tenderness: There is no abdominal tenderness. There is no guarding.   Genitourinary:          Comments: Open excised area postsurgery on right side of perineum  Musculoskeletal:      Cervical back: Neck supple.      Right lower leg: No edema.      Left lower leg: No edema.   Skin:     General: Skin is warm and dry.      Capillary Refill: Capillary refill takes less than 2 seconds.      Coloration: Skin is not jaundiced.      Findings: No rash.   Neurological:      General: No focal deficit present.      Mental Status: He is alert and oriented to person, place, and time.   Psychiatric:         Mood and Affect: Mood normal.         Behavior: Behavior is cooperative.     Home Medications     Medication List      CONTINUE taking these medications     pen needle 1/2\" 29G X 12mm needle; Commonly known as: BD Ultra-Fine Orig   Pen Needle; Use as instructed     ASK your doctor about these medications     albuterol 90 mcg/actuation inhaler; INHALE 2 PUFFS EVERY 4 HOURS AS   NEEDED   aspirin 81 mg EC tablet   atenoloL-chlorthalidone 50-25 mg tablet; Commonly known as: Tenoretic   50; Take 1 tablet by mouth once daily.   DULoxetine 60 mg DR capsule; Commonly known as: Cymbalta; Take 1 capsule   (60 mg) by mouth once daily. For 30 days    mg tablet; Generic drug: ibuprofen; TAKE 1 TABLET THREE TIMES   DAILY AS NEEDED. TAKE WITH FOOD OR MILK.   icosapent ethyL 1 gram capsule; Commonly known as: Vascepa; Take 2   capsules (2 g) by mouth 2 times daily (morning and late afternoon).   insulin aspart 100 unit/mL (3 mL) pen; Commonly known as: NovoLOG   Flexpen U-100 Insulin; INJECT UP TO 50 UNITS UNDER THE SKIN DAILY AS   DIRECTED. DISCARD PEN 28 DAYS AFTER OPENING   Jardiance 25 mg tablet; Generic drug: empagliflozin; TAKE 1 TABLET EVERY   DAY   Lantus Solostar U-100 Insulin 100 unit/mL (3 mL) pen; Generic drug:   insulin glargine; INJECT 90 UNITS " UNDER THE SKIN ONCE DAILY AT BEDTIME.   losartan 25 mg tablet; Commonly known as: Cozaar; Take 1 tablet (25 mg)   by mouth once daily.   methocarbamol 750 mg tablet; Commonly known as: Robaxin; TAKE 1 TABLET   EVERY 4 HOURS AS NEEDED   Mounjaro 7.5 mg/0.5 mL pen injector; Generic drug: tirzepatide; Inject   7.5 mg under the skin 1 (one) time per week.   naproxen 500 mg tablet; Commonly known as: Naprosyn; Take 1 tablet (500   mg) by mouth 2 times a day as needed for mild pain (1 - 3). Do not exceed   more than 2 pills  a day   nitroglycerin 0.4 mg SL tablet; Commonly known as: Nitrostat; Place 1   tablet (0.4 mg) under the tongue every 5 minutes if needed for chest pain.   For 90 days   omeprazole 40 mg DR capsule; Commonly known as: PriLOSEC; Take 1 capsule   (40 mg) by mouth once daily.   phenytoin  mg capsule; Commonly known as: Dilantin; TAKE 1 CAPSULE   EVERY DAY   QUEtiapine 100 mg tablet; Commonly known as: SEROquel; Take 1 tablet   (100 mg) by mouth once daily at bedtime. For 30 days   rosuvastatin 40 mg tablet; Commonly known as: Crestor; Take 1 tablet (40   mg) by mouth once daily.   SUMAtriptan 50 mg tablet; Commonly known as: Imitrex   tamsulosin 0.4 mg 24 hr capsule; Commonly known as: Flomax; Take 2   capsules (0.8 mg) by mouth once daily.       Outpatient Follow-Up  Future Appointments   Date Time Provider Department [place]   6/12/2025 10:45 AM Mika Cavanaugh MD [id number] [facility]   7/14/2025  2:15 PM Melba GusmanD [id number] [facility]   8/18/2025  2:00 PM Frank Quintana MD [id number] [facility]   9/12/2025  3:40 PM Galindo Titus DO [id number] [facility]       Vito Wallace MD

## 2025-05-07 NOTE — CONSULTS
"Nutrition Note:   Nutrition Assessment    Reason for Assessment: Admission nursing screening  Reviewed note from RDN from 5/6 from O[facility]-- pt with planned weigth loss of 20lbs with initiation of Mounjaro. Given wt loss desired and the result of a medication will defer assessment at this time. RDN will continue to follow and intervene as needed thorugh out pt's length of stay.       Anthropometrics:  Height: 180.3 cm (5' 11\")   Weight: 90 kg (198 lb 6.6 oz)   BMI (Calculated): 27.69  Weight History:   Wt Readings from Last 10 Encounters:   05/07/25 90 kg (198 lb 6.6 oz)   05/05/25 86.2 kg (190 lb)   05/02/25 88 kg (194 lb)   03/12/25 93 kg (205 lb)   01/13/25 94.8 kg (209 lb)   12/05/24 94.3 kg (208 lb)   10/20/24 93.4 kg (206 lb)   09/25/24 98 kg (216 lb)   09/04/24 98.6 kg (217 lb 6.4 oz)   07/09/24 98.9 kg (218 lb 0.6 oz)       I/O:     Dietary Orders (From admission, onward)       Start     Ordered    05/07/25 0001  NPO Diet Except: Other (specify); Additional Details: Clear liquids until 0500. May have clears up to 2 hours prior to procedure if exact time is known.; Effective midnight  Diet effective midnight        Question Answer Comment   Except: Other (specify)    Additional Details Clear liquids until 0500. May have clears up to 2 hours prior to procedure if exact time is known.        05/06/25 2129 05/06/25 2117  May Participate in Room Service  ( ROOM SERVICE MAY PARTICIPATE)  Once        Question:  .  Answer:  Yes    05/06/25 2116                      Time Spent (min): 15 minutes       "

## 2025-05-07 NOTE — CONSULTS
Vancomycin Dosing by Pharmacy- INITIAL    Orlin Laguerre is a 59 y.o. year old male who Pharmacy has been consulted for vancomycin dosing for cellulitis, skin and soft tissue. Based on the patient's indication and renal status this patient will be dosed based on a goal AUC of 400-600.     Renal function is currently stable.    Visit Vitals  /80   Pulse 88   Temp 36.6 °C (97.9 °F) (Temporal)   Resp 21        Lab Results   Component Value Date    CREATININE 1.05 2025    CREATININE 1.24 2025    CREATININE 1.92 (H) 2025    CREATININE 0.79 2025        Patient weight is as follows:   Vitals:    25   Weight: 89.8 kg (197 lb 15.6 oz)       Cultures:  No results found for the encounter in last 14 days.        No intake/output data recorded.  I/O during current shift:  I/O this shift:  In: 200 [IV Piggyback:200]  Out: 800 [Urine:800]    Temp (24hrs), Av.5 °C (97.7 °F), Min:36.4 °C (97.5 °F), Max:36.6 °C (97.9 °F)         Assessment/Plan     Patient will not be given a loading dose.  Will initiate vancomycin maintenance, 1000 mg every 12 hours.    This dosing regimen is predicted by [employer] to result in the following pharmacokinetic parameters:    Loading dose: N/A  Regimen: 1000 mg IV every 12 hours.  Start time: 23:46 on 2025  Exposure target: AUC24 (range)400-600 mg/L.hr   OOL68-56: 549 mg/L.hr  AUC24,ss: 570 mg/L.hr  Probability of AUC24 > 400: 94 %  Ctrough,ss: 17.6 mg/L  Probability of Ctrough,ss > 20: 35 %    Follow-up level will be ordered on  at 10:00am unless clinically indicated sooner.  Will continue to monitor renal function daily while on vancomycin and order serum creatinine at least every 48 hours if not already ordered.  Follow for continued vancomycin needs, clinical response, and signs/symptoms of toxicity.       Rosamaria Richter, MelbaD

## 2025-05-07 NOTE — BRIEF OP NOTE
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  ASA: ASA status not filed in the log.  Estimated Blood Loss: 10mL  Intra-op Medications:   Administrations occurring from 0215 to 0430 on 25:   Medication Name Total Dose   sodium chloride 0.9 % irrigation solution 1,000 mL   clindamycin (Cleocin) 900 mg in dextrose 5% IV 50 mL Cannot be calculated   dextrose 50 % injection 12.5 g Cannot be calculated   dextrose 50 % injection 12.5 g Cannot be calculated   dextrose 50 % injection 25 g Cannot be calculated   dextrose 50 % injection 25 g Cannot be calculated   fentaNYL (Sublimaze) injection 50 mcg/mL 100 mcg   glucagon (Glucagen) injection 1 mg Cannot be calculated   glucagon (Glucagen) injection 1 mg Cannot be calculated   glucagon (Glucagen) injection 1 mg Cannot be calculated   glucagon (Glucagen) injection 1 mg Cannot be calculated   hydrocortisone sodium succinate (PF) (Solu-CORTEF) injection 50 mg Cannot be calculated   insulin regular (HumuLIN, NovoLIN) bolus from bag 2-6 Units Cannot be calculated   insulin regular 100 unit/100 mL (1 unit/mL) in 0.9 % NaCl infusion 2.97 Units   lidocaine (Xylocaine) injection 2 % 80 mg   midazolam PF (Versed) injection 1 mg/mL 1 mg "   norepinephrine (Levophed) 16 mcg/mL IV bolus 16 mcg   piperacillin-tazobactam (Zosyn) 3.375 g in dextrose (iso) IV 50 mL Cannot be calculated   propofol (Diprivan) injection 10 mg/mL 150 mg   rocuronium (ZeMuron) 50 mg/5 mL injection 50 mg   vancomycin (Vancocin) 1,000 mg in dextrose 5%  mL Cannot be calculated   vancomycin (Vancocin) pharmacy to dose - pharmacy monitoring Cannot be calculated   vasopressin (Vasostrict) 0.2 unit/mL in 5% dextrose 100 mL IV 2.28 Units   insulin regular (HumuLIN, NovoLIN) bolus from bag 0-10 Units 2 Units              Anesthesia Record               Intraprocedure I/O Totals          Intake    Norepinephrine Drip 0.00 mL    The total shown is the total volume documented since Anesthesia Start was filed.    Vasopressin Drip 0.00 mL    The total shown is the total volume documented since Anesthesia Start was filed.    Total Intake 0 mL          Specimen: No specimens collected       Findings: multiple small focal areas of necrotic tissue which were debrided but no obvious purulence    Complications:  None; patient tolerated the procedure well.     Disposition: ICU extubated and hemodynamically unstable on pressors  Condition: unstable  Specimens Collected: No specimens collected  Attending Attestation:     Cande Velasco  Phone Number: [phone]

## 2025-05-07 NOTE — PROGRESS NOTES
"Pharmacy Medication History Review    Orlin Laguerre \"Jeri" is a 59 y.o. 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 Pharmacy reviewed the patient's [id number] medications and allergies for accuracy.    Updated usual Lantus home dose from 90 --> 80 units (per patient);  Otherwise, no other changes made to current Epic PTA Medication List    The list below reflects the updated PTA list.   Prior to Admission Medications   Prescriptions  Patient / Chart / Pharmacy Reported?   DULoxetine (Cymbalta) 60 mg DR capsule  Yes   Sig: Take 1 capsule (60 mg) by mouth once daily.     mg tablet  Yes   Sig: TAKE 1 TABLET THREE TIMES DAILY AS NEEDED.   TAKE WITH FOOD OR MILK.  LF 1/6/25, #270/90day;   caution: both IBU and Naproxen in PTA Med List (use one or other).    Jardiance 25 mg  Yes   Sig: TAKE 1 TABLET EVERY DAY   Lantus Solostar U-100 Insulin 100 unit/mL (3 mL) pen  Yes   Sig: Inject 80 Units under the skin once daily at bedtime.   QUEtiapine (SEROquel) 100 mg tablet  Yes   Sig: Take 1 tablet (100 mg) by mouth once daily at bedtime.    SUMAtriptan (Imitrex) 50 mg tablet  Yes   Sig: Take 1 tablet (50 mg) by mouth once daily as needed.   At least 2 hours between doses for 60 days   albuterol 90 mcg/actuation inhaler  Yes   Sig: INHALE 2 PUFFS EVERY 4 HOURS AS NEEDED  No recent fill (LF 11/23/24, 16 day); left in PTA List PRN.    aspirin 81 mg EC tablet  Yes   Sig: Take 1 tablet (81 mg) by mouth once daily.    atenoloL-chlorthalidone (Tenoretic 50) 50-25 mg tablet  Yes   Sig: Take 1 tablet by mouth once daily.   icosapent ethyL (Vascepa) 1 gram capsule  Yes   Sig: Take 2 capsules (2 g) by mouth 2 times daily   (morning and late afternoon).   insulin aspart (NovoLOG Flexpen U-100 Insulin) 100 unit/mL (3 mL) pen  Yes   Sig: INJECT UP TO 50 UNITS UNDER THE SKIN DAILY   AS DIRECTED. DISCARD PEN 28 DAYS AFTER OPENING  Variable use depending on blood glucose.    losartan (Cozaar) 25 mg tablet  Yes   Sig: Take 1 " tablet (25 mg) by mouth once daily.   methocarbamol (Robaxin) 750 mg tablet  Yes   Sig: TAKE 1 TABLET EVERY 4 HOURS AS NEEDED   naproxen (Naprosyn) 500 mg tablet  Yes   Sig: Take 1 tablet (500 mg) by mouth 2 times a day as   needed for mild pain (1 - 3). Do not exceed more than 2 pills  a day  LF 4/20/25, #60 / 30 day;   caution: both IBU and Naproxen in PTA Med List (use one or other).    nitroglycerin (Nitrostat) 0.4 mg SL tablet  Yes   Sig: Place 1 tablet (0.4 mg) under the tongue every 5   minutes if needed for chest pain. For 90 days   omeprazole (PriLOSEC) 40 mg DR capsule  Yes   Sig: Take 1 capsule (40 mg) by mouth once daily.   phenytoin ER (Dilantin) 100 mg capsule  Yes   Sig: TAKE 1 CAPSULE EVERY DAY   rosuvastatin (Crestor) 40 mg tablet  Yes   Sig: Take 1 tablet (40 mg) by mouth once daily.   tamsulosin (Flomax) 0.4 mg 24 hr capsule  Yes   Sig: Take 2 capsules (0.8 mg) by mouth once daily.   tirzepatide (Mounjaro) 7.5 mg/0.5 mL pen injector  Yes   Sig: Inject 7.5 mg under the skin 1 (one) time per week.  Usually on WEDNESDAYS               The list below reflects the updated allergy list. Please review each documented allergy for additional clarification and justification.  Allergies  Reviewed by Darrian Osborne, YAIMA-CARMEN on 5/6/2025        Severity Reactions Comments    Varenicline High Other Violent behavior and vomiting    Metformin Hcl Not Specified Other diarrhea    Tramadol Not Specified Other, Nausea/vomiting vomiting          Patient declines M2B at discharge.   Local Pharmacy if needed:  [id number] [place] - [address] AT [facility]  [phone]     Sources used to complete the med history include:  Patient Interview (awake/alert; unfamiliar with med names;  but has mobile phone harry with list of home meds;  we go through each one to confirm/clarify.)  Current [facility] Epic PTA Medication List  Epic Dispense Report (Pharmacy Fill Activity)  Chart Review;  Recent/Past Encounters; Office Visits  OARRS (no recent activity found;   Last 11/20/24 gabapentin 600 mg; not a current PTA Med)    Below are additional concerns with the patient's PTA list:  See additional comments/notes/last fill dates in PTA Table above.  Pt states usually takes most daily meds in PM; Jardiance in AM.     ----------------------------------  Jorge Mabry PharmD, [facility]  Transitions of Care Pharmacist  Medication reconciliation complete  Please reach out via Epic Secure Chat ([email or web address]) for questions,   or if no response call [phone].

## 2025-05-07 NOTE — ANESTHESIA PROCEDURE NOTES
Airway  Date/Time: 5/7/2025 3:33 AM  Reason: elective      Staffing  Performed: resident   Authorized by: Lester Daniels DO    Performed by: Jose Carlos Garcia MD  Patient location during procedure: OR    Patient Condition  Indications for airway management: anesthesia and airway protection  Patient position: sniffing  Planned trial extubation  Sedation level: deep     Final Airway Details   Preoxygenated: yes  Final airway type: endotracheal airway  Successful airway: ETT  Cuffed: yes   Successful intubation technique: direct laryngoscopy  Endotracheal tube insertion site: oral  Blade: Rosemarie  Blade size: #3  ETT size (mm): 7.5  Cormack-Lehane Classification: grade I - full view of glottis  Measured from: lips  ETT to lips (cm): 23  Number of attempts at approach: 1

## 2025-05-07 NOTE — CARE PLAN
Problem: Pain - Adult  Goal: Verbalizes/displays adequate comfort level or baseline comfort level  5/7/2025 0537 by Tigist Alicea RN  Outcome: Progressing  Problem: Safety - Adult  Goal: Free from fall injury  5/7/2025 0537 by Tigist Alicea RN  Outcome: Progressing     Problem: Discharge Planning  Goal: Discharge to home or other facility with appropriate resources  5/7/2025 0537 by Tigist Alicea RN  Outcome: Progressing     Problem: Chronic Conditions and Co-morbidities  Goal: Patient's chronic conditions and co-morbidity symptoms are monitored and maintained or improved  5/7/2025 0537 by Tigist Alicea RN  Outcome: Progressing     Problem: Nutrition  Goal: Nutrient intake appropriate for maintaining nutritional needs  5/7/2025 0537 by Tigist Alicea RN  Outcome: Progressing     Problem: Diabetes  Goal: Achieve decreasing blood glucose levels by end of shift  5/7/2025 0537 by Tigist Alicea RN  Outcome: Progressing  Goal: Increase stability of blood glucose readings by end of shift  5/7/2025 0537 by Tigist Alicea RN  Outcome: Progressing  Goal: Decrease in ketones present in urine by end of shift  5/7/2025 0537 by Tigist Alicea RN  Outcome: Progressing  Goal: Maintain electrolyte levels within acceptable range throughout shift  5/7/2025 0537 by Tigist Alicea RN  Outcome: Progressing  Goal: Maintain glucose levels >70mg/dl to <250mg/dl throughout shift  5/7/2025 0537 by Tigist Alicea RN  Outcome: Progressing  Goal: No changes in neurological exam by end of shift  5/7/2025 0537 by Tigist Alicea RN  Outcome: Progressing  Goal: Learn about and adhere to nutrition recommendations by end of shift  5/7/2025 0537 by Tigist Alicea RN  Outcome: Progressing  Goal: Vital signs within normal range for age by end of shift  5/7/2025 0537 by Tigist Alicea RN  Outcome: Progressing    Goal: Increase self care and/or family involovement by end of shift  5/7/2025 0537 by Tigist SANCHEZ  KENYATTA Alicea  Outcome: Progressing    Goal: Receive DSME education by end of shift  5/7/2025 0537 by Tigist Alicea RN  Outcome: Progressing       Problem: Fall/Injury  Goal: Not fall by end of shift  Outcome: Progressing  Goal: Be free from injury by end of the shift  Outcome: Progressing  Goal: Verbalize understanding of personal risk factors for fall in the hospital  Outcome: Progressing  Goal: Verbalize understanding of risk factor reduction measures to prevent injury from fall in the home  Outcome: Progressing  Goal: Use assistive devices by end of the shift  Outcome: Progressing  Goal: Pace activities to prevent fatigue by end of the shift  Outcome: Progressing

## 2025-05-07 NOTE — ANESTHESIA PROCEDURE NOTES
Arterial Line:    Date/Time: 5/7/2025 4:20 AM    Staffing  Performed: resident and attending   Authorized by: Lester Daniels DO    Performed by: Jose Carlos Garcia MD    An arterial line was placed. Procedure performed using ultrasound guidance and surface landmarks.in the OR for the following indication(s): continuous blood pressure monitoring and blood sampling needed.    A 20 gauge (size), 5 cm (length), Angiocath (type) catheter was placed into the Right radial artery, secured by Tegadenay   Seldinger technique used.  Events:  patient tolerated procedure well with no complications.      Additional notes:  UNSUCCESSFUL FIRST ATTEMPT VIA LANDMARK, SUCCESSFUL WITH ULTRASOUND WITH NO COMPLICATIONS

## 2025-05-07 NOTE — PROGRESS NOTES
Vancomycin Dosing by Pharmacy- FOLLOW UP    Orlin Laguerre is a 59 y.o. year old male who Pharmacy has been consulted for vancomycin dosing for cellulitis, skin and soft tissue. Based on the patient's indication and renal status this patient is being dosed based on a goal AUC of 400-600.     Renal function is currently stable.    Current vancomycin dose: 1000 mg given every 12 hours    Estimated vancomycin AUC on current dose: 418 mg/L.hr       Visit Vitals  /56   Pulse 57   Temp 35.9 °C (96.6 °F) (Temporal)   Resp 22        Lab Results   Component Value Date    CREATININE 0.92 2025    CREATININE 1.05 2025    CREATININE 1.24 2025    CREATININE 1.92 (H) 2025        Patient weight is as follows:   Vitals:    25 0615   Weight: 90 kg (198 lb 6.6 oz)       Cultures:  No results found for the encounter in last 14 days.       I/O last 3 completed shifts:  In: 4081.1 (45.3 mL/kg) [I.V.:176.2 (2 mL/kg); IV Piggyback:3904.9]  Out: 1815 (20.2 mL/kg) [Urine:1815 (0.6 mL/kg/hr)]  Weight: 90 kg   I/O during current shift:  I/O this shift:  In: 233.7 [I.V.:33.7; IV Piggyback:200]  Out: 815 [Urine:815]    Temp (24hrs), Av.3 °C (97.3 °F), Min:35.9 °C (96.6 °F), Max:36.6 °C (97.9 °F)      Assessment/Plan    AUC on lower end of therapeutic range. Will increase vancomycin to 1250 mg Q12.    This dosing regimen is predicted by XTRMRx to result in the following pharmacokinetic parameters:  Regimen: 1250 mg IV every 12 hours.  Start time: 01:00 on 2025  Exposure target: AUC24 (range)400-600 mg/L.hr   JFR39-49: 512 mg/L.hr  AUC24,ss: 523 mg/L.hr  Probability of AUC24 > 400: 99 %  Ctrough,ss: 13.6 mg/L  Probability of Ctrough,ss > 20: 1 %    The next level will be obtained on  at 10 AM. May be obtained sooner if clinically indicated.   Will continue to monitor renal function daily while on vancomycin and order serum creatinine at least every 48 hours if not already ordered.  Follow for  continued vancomycin needs, clinical response, and signs/symptoms of toxicity.       Singh Agustin, PharmD

## 2025-05-07 NOTE — ANESTHESIA PREPROCEDURE EVALUATION
"Patient: Orlin Laguerre \"Michael\"    Procedure Information       Anesthesia Start Date/Time: 25    Procedure: DEBRIDEMENT, WOUND    Location: [facility] OR 11 / [facility]    Surgeons: Cande Velasco MD            Relevant Problems   Cardiac   (+) Angina pectoris   (+) Chest pain   (+) Coronary artery disease   (+) Essential hypertension   (+) Hypertriglyceridemia   (+) Mixed hyperlipidemia   (+) Peripheral vascular disease (CMS-HCC)      Pulmonary   (+) Asthma   (+) Mild intermittent asthma without complication (HHS-HCC)      Neuro   (+) Acute anxiety   (+) Carpal tunnel syndrome of right wrist   (+) Cervical radiculopathy   (+) Cubital tunnel syndrome on right   (+) Depression   (+) Entrapment of right ulnar nerve   (+) Lesion of ulnar nerve   (+) Lumbar radiculopathy   (+) PTSD (post-traumatic stress disorder)   (+) Sciatica, left side      GI   (+) Gastroesophageal reflux disease      /Renal   (+) Benign localized prostatic hyperplasia with lower urinary tract symptoms (LUTS)      Liver   (+) Chronic calculous cholecystitis   (+) Steatosis of liver      Endocrine   (+) Diabetic neuropathy (Multi)   (+) Type 2 diabetes mellitus with obesity (Multi)      Musculoskeletal   (+) Carpal tunnel syndrome of right wrist   (+) Disc degeneration, lumbar   (+) Lumbar herniated disc   (+) Osteoarthritis of left knee      ID   (+) Candidiasis of mouth   (+) 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   (+) Samm's gangrene (Multi)   (+) 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)   (+) Gas gangrene (Multi)       Clinical information reviewed:    Allergies  Meds               NPO Detail:  No data recorded     Physical Exam    Airway  Mallampati: II  TM distance: >3 FB  Neck ROM: limited  Mouth openin finger widths     Cardiovascular   Rhythm: regular  Rate: normal     Dental     (+) lower dentures, upper dentures     Pulmonary Breath sounds clear to auscultation     Abdominal - normal exam     Other findings: RIGHT " internal jugular TRIPLE LUMEN IN PLACE        Anesthesia Plan    History of general anesthesia?: yes  History of complications of general anesthesia?: no    ASA 3 - emergent     general   (STANDARD MONITORS PLUS ARTERIAL LINE)  intravenous induction   Postoperative pain plan includes opioids.  Trial extubation is planned.  Anesthetic plan and risks discussed with patient.  Use of blood products discussed with patient who consented to blood products.    Plan discussed with resident.

## 2025-05-07 NOTE — PROGRESS NOTES
[facility]  TRAUMA ICU - PROGRESS NOTE    Patient Name: Orlin Laguerre  MRN: [id number]  Admit Date: 506  : 1965  AGE: 59 y.o.   GENDER: male  ======================================================================  MECHANISM OF INJURY:   58 yo M s/p excisional debridement of perineum for NSTI at [facility], [place]  AM.     LOC (yes/no?): no  Anticoagulant / Anti-platelet Rx? (for what dx?): no  Referring Facility Name (N/A for scene EMR run): [facility], [place]    INJURIES:   Septic shock  NSTI    OTHER MEDICAL PROBLEMS:  Asthma, Samm Gangrene, PTSD, PVD, HLD, HTN, CAD, BPH, chronic pain, depression, DM2,     INCIDENTAL FINDINGS:  N/a    PROCEDURES:  N/a    ==============================================================================  TODAY'S ASSESSMENT AND PLAN OF CARE:  Orlin Laguerre is a 59 y.o. male in the ICU due to pressor requirements    NEURO/PAIN/SEDATION:   #multimodal pain control  -Analgesia: oxy 5/10, dilaudid prn, tylenol, robaxin  -GCS 15  -home Cymbalta and Seroquel    RESPIRATORY:   #asthma hx  -home albuterol     CARDIOVASC:   #hypotension  -1 L fluid ovn   -Pressors  -Levophed .05  -Vasopressin .03  -tele  -home rosuvastatin    GI:   -NPO  -Zofran prn  -rectal tube removed  AM  -BR: Miralax    :   -Schmid in place  -voiding appropriately    FEN:   #hypophosphatemia  #hypokalemia  -Monitor electrolytes and replete as needed  -Phos and K+ repleted AM     HEMATOLOGIC:   -H/H stable, Hgb >13  -PRN labs    ENDOCRINE:   #DM2  -POCT  -insulin infusion  -hydrocortisone due to recent steroid use    MUSCULOSKELETAL/SKIN:   #NSTI  -home phenytoin for muscle spasms in R calf  -RTOR for further debridement of perineum     INFECTIOUS DISEASE:   #leukocytosis  #septic shock  -Clindamycin, Zosyn, Vancomycin  -trend CBCs  -repeat Blood cultures     GI PROPHYLAXIS: Protonix     DVT PROPHYLAXIS:   -SCDs  -Lovenox 30 BID    DISPOSITION: Plan for RTOR due to  "increasing pressor requirements, continue care in TICU post-op    Pt seen and discussed with attending Dr. Velasco     Total face to face time spent with patient/family of 30 minutes, with >50% of the time spent discussing plan of care/management, counseling/educating on disease processes, explaining results of diagnostic testing. I have reviewed all medications, laboratory results, and imaging pertinent for today's encounter.    Lila Peterson PA-C  Trauma, Critical Care, Acute Care Surgery   Floor: [id number]  TSICU: [id number]    ==============================================================================  CHIEF COMPLAINT / OVERNIGHT EVENTS / HPI:   Increasing dose of Levophed, Vaso added.     PHYSICAL EXAM:  Heart Rate:  []   Temp:  [36.4 °C (97.5 °F)-36.6 °C (97.9 °F)]   Resp:  [10-31]   BP: ()/()   Height:  [180.3 cm (5' 11\")]   Weight:  [89.8 kg (197 lb 15.6 oz)]   SpO2:  [97 %-100 %]   Physical Exam  Constitutional:       General: He is not in acute distress.     Appearance: He is normal weight.      Comments: On exam patient talkative and resting comfortably in bed   HENT:      Head: Normocephalic and atraumatic.      Right Ear: External ear normal.      Left Ear: External ear normal.      Nose: Nose normal.      Mouth/Throat:      Mouth: Mucous membranes are moist.      Pharynx: Oropharynx is clear.   Eyes:      Extraocular Movements: Extraocular movements intact.   Cardiovascular:      Rate and Rhythm: Normal rate and regular rhythm.      Pulses: Normal pulses.      Heart sounds: Normal heart sounds.   Pulmonary:      Effort: Pulmonary effort is normal.   Abdominal:      General: Abdomen is flat. There is no distension.   Genitourinary:     Penis: Normal.       Comments: Rectal tube and hanley in place on exam  Musculoskeletal:         General: Normal range of motion.      Cervical back: Normal range of motion.      Comments: Post-surgical debridement of perineum, moderate amount of necrotic " tissue   Skin:     General: Skin is warm and dry.      Capillary Refill: Capillary refill takes less than 2 seconds.   Neurological:      Mental Status: He is alert and oriented to person, place, and time.      Sensory: No sensory deficit.         IMAGING SUMMARY:  (summary of new imaging findings, not a copy of dictation)  CT Pelvis: gas infection R perineum  CT Head/c-spine: no acute traumatic findings  CXR: no acute cardiopulmonary process    LABS:  Results from last 7 days   Lab Units 05/06/25 2135 05/06/25 0728 05/05/25  1552   WBC AUTO x10*3/uL 18.3* 18.9* 19.7*   HEMOGLOBIN g/dL 13.4* 13.3* 15.1   HEMATOCRIT % 37.5* 37.8* 41.7   PLATELETS AUTO x10*3/uL 235 196 184   NEUTROS PCT AUTO %  --   --  89.3   LYMPHS PCT AUTO %  --   --  3.2   MONOS PCT AUTO %  --   --  2.6   EOS PCT AUTO %  --   --  1.0     Results from last 7 days   Lab Units 05/06/25 2135   APTT seconds 27   INR  1.1     Results from last 7 days   Lab Units 05/06/25 2135 05/06/25 0728 05/05/25  1552   SODIUM mmol/L 134* 133* 128*   POTASSIUM mmol/L 3.2* 3.4* 3.0*   CHLORIDE mmol/L 102 102 93*   CO2 mmol/L 17* 17* 23   BUN mg/dL 22 23 25*   CREATININE mg/dL 1.05 1.24 1.92*   CALCIUM mg/dL 7.8* 7.9* 8.5*   PROTEIN TOTAL g/dL  --  5.7* 6.5   BILIRUBIN TOTAL mg/dL  --  0.8 0.7   ALK PHOS U/L  --  99 100   ALT U/L  --  24 37   AST U/L  --  17 28   GLUCOSE mg/dL 244* 176* 247*     Results from last 7 days   Lab Units 05/06/25 0728 05/05/25  1552   BILIRUBIN TOTAL mg/dL 0.8 0.7     Results from last 7 days   Lab Units 05/06/25  2345 05/06/25 2137 05/06/25  1116   POCT PH, ARTERIAL pH 7.39 7.40 7.41   POCT PCO2, ARTERIAL mm Hg 30* 27* 28*   POCT PO2, ARTERIAL mm Hg 96* 93 109*   POCT HCO3 CALCULATED, ARTERIAL mmol/L 18.2* 16.7* 17.7*   POCT BASE EXCESS, ARTERIAL mmol/L -5.6* -6.6* -5.5*     I have reviewed all medications, laboratory results, and imaging pertinent for today's encounter.
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
## 2025-05-07 NOTE — PROCEDURES
"Patient ID: Orlin Laguerre \"Jeri" is a 59 y.o. male.    Central Line    Date/Time: 5/7/2025 1:25 AM    Performed by: Manny Pemberton MD  Authorized by: Manny Pemberton MD    Consent:     Consent obtained:  Written    Consent given by:  Patient    Risks, benefits, and alternatives were discussed: yes      Risks discussed:  Arterial puncture, incorrect placement, bleeding, infection and pneumothorax    Alternatives discussed:  No treatment and alternative treatment  Universal protocol:     Procedure explained and questions answered to patient or proxy's satisfaction: yes      Relevant documents present and verified: yes      Test results available: yes      Imaging studies available: yes      Required blood products, implants, devices, and special equipment available: yes      Site/side marked: yes      Immediately prior to procedure, a time out was called: yes      Patient identity confirmed:  Hospital-assigned identification number and arm band  Pre-procedure details:     Indication(s): central venous access      Hand hygiene: Hand hygiene performed prior to insertion      Sterile barrier technique: All elements of maximal sterile technique followed      Skin preparation:  Chlorhexidine    Skin preparation agent: Skin preparation agent completely dried prior to procedure    Sedation:     Sedation type:  None  Anesthesia:     Anesthesia method:  Local infiltration    Local anesthetic:  Lidocaine 1% WITH epi  Procedure details:     Location:  R internal jugular    Patient position:  Supine    Procedural supplies:  Triple lumen    Catheter size:  7 Fr    Catheter length:  16    Ultrasound guidance: yes      Ultrasound guidance timing: prior to insertion and real time      Sterile ultrasound techniques: Sterile gel and sterile probe covers were used      Number of attempts:  1    Successful placement: yes    Post-procedure details:     Post-procedure:  Dressing applied and line sutured    Assessment:  Blood " return through all ports, no pneumothorax on x-ray and placement verified by x-ray    Procedure completion:  Tolerated

## 2025-05-07 NOTE — ANESTHESIA POSTPROCEDURE EVALUATION
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    Vitals Value Taken Time   /48 05/07/25 04:51   Temp 36.3 05/07/25 04:51   Pulse 74 05/07/25 04:51   Resp 20 05/07/25 04:51   SpO2 99 % 05/07/25 04:51       Anesthesia Post Evaluation    Patient location during evaluation: ICU  Patient participation: complete - patient participated  Level of consciousness: awake and alert  Pain management: satisfactory to patient  Airway patency: patent  Cardiovascular status: blood pressure returned to baseline and acceptable  Respiratory status: acceptable  Hydration status: acceptable  Postoperative Nausea and Vomiting: none        There were no known notable events for this encounter.    "

## 2025-05-07 NOTE — SIGNIFICANT EVENT
Brief H&P - Full note to follow     58 y/o male presents from [facility] following excisional debridement of the perineum for NSTI earlier this AM. Extubated post-op and arrived to the TSICU this evening on levophed infusion.     On presentation, the patient is alert and oriented in no acute distress. He is comfortable. Surgical dressings removed to reveal gray fibrinous exudate with no eva pus in the wound bed.     Labs reveal persistent leukocytosis of 18,000, stable Hgb, and hypokalemia as well as hypophosphatemia. Hgb stable and is > 13.     A/P 58 y/o male s/p excisional debridement of perineal NSTI who presents with hypotension. Based on my assessment, may be hypovolemic so will proceed with some fluid resuscitation as well as correction of electrolytes. If unable to wean pressors, will likely take back to the OR for exam and possible additional debridement.     Cande Velasco MD

## 2025-05-08 ENCOUNTER — ANESTHESIA EVENT (OUTPATIENT)
Dept: OPERATING ROOM | Facility: HOSPITAL | Age: 60
End: 2025-05-08
Payer: MEDICARE

## 2025-05-08 PROBLEM — M72.6: Status: ACTIVE | Noted: 2025-05-06

## 2025-05-08 LAB
ALBUMIN SERPL BCP-MCNC: 2.7 G/DL (ref 3.4–5)
ALBUMIN SERPL BCP-MCNC: 3 G/DL (ref 3.4–5)
ANION GAP BLDA CALCULATED.4IONS-SCNC: ABNORMAL MMOL/L
ANION GAP SERPL CALC-SCNC: 12 MMOL/L (ref 10–20)
ANION GAP SERPL CALC-SCNC: 13 MMOL/L (ref 10–20)
B-LACTAMASE ORGANISM ISLT: NEGATIVE
B-LACTAMASE ORGANISM ISLT: POSITIVE
BACTERIA SPEC CULT: ABNORMAL
BASE EXCESS BLDA CALC-SCNC: -1.3 MMOL/L (ref -2–3)
BODY TEMPERATURE: 37 DEGREES CELSIUS
BUN SERPL-MCNC: 22 MG/DL (ref 6–23)
BUN SERPL-MCNC: 28 MG/DL (ref 6–23)
CA-I BLDA-SCNC: 1.19 MMOL/L (ref 1.1–1.33)
CALCIUM SERPL-MCNC: 7.7 MG/DL (ref 8.6–10.6)
CALCIUM SERPL-MCNC: 8.4 MG/DL (ref 8.6–10.6)
CHLORIDE BLDA-SCNC: 104 MMOL/L (ref 98–107)
CHLORIDE SERPL-SCNC: 103 MMOL/L (ref 98–107)
CHLORIDE SERPL-SCNC: 99 MMOL/L (ref 98–107)
CO2 SERPL-SCNC: 23 MMOL/L (ref 21–32)
CO2 SERPL-SCNC: 24 MMOL/L (ref 21–32)
CREAT SERPL-MCNC: 0.88 MG/DL (ref 0.5–1.3)
CREAT SERPL-MCNC: 1.09 MG/DL (ref 0.5–1.3)
EGFRCR SERPLBLD CKD-EPI 2021: 79 ML/MIN/1.73M*2
EGFRCR SERPLBLD CKD-EPI 2021: >90 ML/MIN/1.73M*2
ERYTHROCYTE [DISTWIDTH] IN BLOOD BY AUTOMATED COUNT: 12.6 % (ref 11.5–14.5)
GLUCOSE BLD MANUAL STRIP-MCNC: 132 MG/DL (ref 74–99)
GLUCOSE BLD MANUAL STRIP-MCNC: 155 MG/DL (ref 74–99)
GLUCOSE BLD MANUAL STRIP-MCNC: 159 MG/DL (ref 74–99)
GLUCOSE BLD MANUAL STRIP-MCNC: 160 MG/DL (ref 74–99)
GLUCOSE BLD MANUAL STRIP-MCNC: 168 MG/DL (ref 74–99)
GLUCOSE BLD MANUAL STRIP-MCNC: 181 MG/DL (ref 74–99)
GLUCOSE BLD MANUAL STRIP-MCNC: 189 MG/DL (ref 74–99)
GLUCOSE BLD MANUAL STRIP-MCNC: 197 MG/DL (ref 74–99)
GLUCOSE BLD MANUAL STRIP-MCNC: 211 MG/DL (ref 74–99)
GLUCOSE BLD MANUAL STRIP-MCNC: 214 MG/DL (ref 74–99)
GLUCOSE BLD MANUAL STRIP-MCNC: 221 MG/DL (ref 74–99)
GLUCOSE BLDA-MCNC: 175 MG/DL (ref 74–99)
GLUCOSE SERPL-MCNC: 207 MG/DL (ref 74–99)
GLUCOSE SERPL-MCNC: 216 MG/DL (ref 74–99)
GRAM STN SPEC: ABNORMAL
HCO3 BLDA-SCNC: 23.7 MMOL/L (ref 22–26)
HCT VFR BLD AUTO: 34.8 % (ref 41–52)
HCT VFR BLD EST: 35 % (ref 41–52)
HGB BLD-MCNC: 12.3 G/DL (ref 13.5–17.5)
HGB BLDA-MCNC: 11.6 G/DL (ref 13.5–17.5)
INHALED O2 CONCENTRATION: 21 %
LACTATE BLDA-SCNC: 0.7 MMOL/L (ref 0.4–2)
MAGNESIUM SERPL-MCNC: 2.17 MG/DL (ref 1.6–2.4)
MCH RBC QN AUTO: 30.4 PG (ref 26–34)
MCHC RBC AUTO-ENTMCNC: 35.3 G/DL (ref 32–36)
MCV RBC AUTO: 86 FL (ref 80–100)
NRBC BLD-RTO: 0 /100 WBCS (ref 0–0)
OXYHGB MFR BLDA: 76.8 % (ref 94–98)
PCO2 BLDA: 40 MM HG (ref 38–42)
PH BLDA: 7.38 PH (ref 7.38–7.42)
PHOSPHATE SERPL-MCNC: 2 MG/DL (ref 2.5–4.9)
PHOSPHATE SERPL-MCNC: 2.2 MG/DL (ref 2.5–4.9)
PLATELET # BLD AUTO: 186 X10*3/UL (ref 150–450)
PO2 BLDA: 46 MM HG (ref 85–95)
POTASSIUM BLDA-SCNC: 2.7 MMOL/L (ref 3.5–5.3)
POTASSIUM SERPL-SCNC: 3 MMOL/L (ref 3.5–5.3)
POTASSIUM SERPL-SCNC: 3.1 MMOL/L (ref 3.5–5.3)
RBC # BLD AUTO: 4.05 X10*6/UL (ref 4.5–5.9)
SAO2 % BLDA: 79 % (ref 94–100)
SODIUM BLDA-SCNC: ABNORMAL MMOL/L
SODIUM SERPL-SCNC: 132 MMOL/L (ref 136–145)
SODIUM SERPL-SCNC: 136 MMOL/L (ref 136–145)
WBC # BLD AUTO: 10.1 X10*3/UL (ref 4.4–11.3)

## 2025-05-08 PROCEDURE — 2500000004 HC RX 250 GENERAL PHARMACY W/ HCPCS (ALT 636 FOR OP/ED): Mod: JZ | Performed by: STUDENT IN AN ORGANIZED HEALTH CARE EDUCATION/TRAINING PROGRAM

## 2025-05-08 PROCEDURE — 2500000004 HC RX 250 GENERAL PHARMACY W/ HCPCS (ALT 636 FOR OP/ED): Mod: JZ

## 2025-05-08 PROCEDURE — 37799 UNLISTED PX VASCULAR SURGERY: CPT | Performed by: STUDENT IN AN ORGANIZED HEALTH CARE EDUCATION/TRAINING PROGRAM

## 2025-05-08 PROCEDURE — 2500000004 HC RX 250 GENERAL PHARMACY W/ HCPCS (ALT 636 FOR OP/ED): Mod: JZ | Performed by: NURSE PRACTITIONER

## 2025-05-08 PROCEDURE — 2500000002 HC RX 250 W HCPCS SELF ADMINISTERED DRUGS (ALT 637 FOR MEDICARE OP, ALT 636 FOR OP/ED)

## 2025-05-08 PROCEDURE — 99291 CRITICAL CARE FIRST HOUR: CPT | Performed by: SURGERY

## 2025-05-08 PROCEDURE — 2500000002 HC RX 250 W HCPCS SELF ADMINISTERED DRUGS (ALT 637 FOR MEDICARE OP, ALT 636 FOR OP/ED): Performed by: STUDENT IN AN ORGANIZED HEALTH CARE EDUCATION/TRAINING PROGRAM

## 2025-05-08 PROCEDURE — 1200000002 HC GENERAL ROOM WITH TELEMETRY DAILY

## 2025-05-08 PROCEDURE — 83735 ASSAY OF MAGNESIUM: CPT | Performed by: STUDENT IN AN ORGANIZED HEALTH CARE EDUCATION/TRAINING PROGRAM

## 2025-05-08 PROCEDURE — 97530 THERAPEUTIC ACTIVITIES: CPT | Mod: GO

## 2025-05-08 PROCEDURE — 97162 PT EVAL MOD COMPLEX 30 MIN: CPT | Mod: GP | Performed by: PHYSICAL THERAPIST

## 2025-05-08 PROCEDURE — 82947 ASSAY GLUCOSE BLOOD QUANT: CPT

## 2025-05-08 PROCEDURE — 99232 SBSQ HOSP IP/OBS MODERATE 35: CPT | Performed by: SURGERY

## 2025-05-08 PROCEDURE — 85018 HEMOGLOBIN: CPT | Performed by: STUDENT IN AN ORGANIZED HEALTH CARE EDUCATION/TRAINING PROGRAM

## 2025-05-08 PROCEDURE — 85027 COMPLETE CBC AUTOMATED: CPT | Performed by: STUDENT IN AN ORGANIZED HEALTH CARE EDUCATION/TRAINING PROGRAM

## 2025-05-08 PROCEDURE — 2500000001 HC RX 250 WO HCPCS SELF ADMINISTERED DRUGS (ALT 637 FOR MEDICARE OP)

## 2025-05-08 PROCEDURE — 97166 OT EVAL MOD COMPLEX 45 MIN: CPT | Mod: GO

## 2025-05-08 PROCEDURE — 97116 GAIT TRAINING THERAPY: CPT | Mod: GP | Performed by: PHYSICAL THERAPIST

## 2025-05-08 PROCEDURE — 80069 RENAL FUNCTION PANEL: CPT | Performed by: STUDENT IN AN ORGANIZED HEALTH CARE EDUCATION/TRAINING PROGRAM

## 2025-05-08 PROCEDURE — 2500000005 HC RX 250 GENERAL PHARMACY W/O HCPCS: Performed by: NURSE PRACTITIONER

## 2025-05-08 RX ORDER — POTASSIUM CHLORIDE 29.8 MG/ML
40 INJECTION INTRAVENOUS ONCE
Status: COMPLETED | OUTPATIENT
Start: 2025-05-08 | End: 2025-05-08

## 2025-05-08 RX ORDER — ENOXAPARIN SODIUM 100 MG/ML
40 INJECTION SUBCUTANEOUS DAILY
Status: DISCONTINUED | OUTPATIENT
Start: 2025-05-09 | End: 2025-05-12 | Stop reason: HOSPADM

## 2025-05-08 RX ORDER — INSULIN LISPRO 100 [IU]/ML
0-5 INJECTION, SOLUTION INTRAVENOUS; SUBCUTANEOUS
Status: DISCONTINUED | OUTPATIENT
Start: 2025-05-08 | End: 2025-05-12 | Stop reason: HOSPADM

## 2025-05-08 RX ORDER — POTASSIUM CHLORIDE 20 MEQ/1
40 TABLET, EXTENDED RELEASE ORAL ONCE
Status: COMPLETED | OUTPATIENT
Start: 2025-05-08 | End: 2025-05-08

## 2025-05-08 RX ADMIN — AMPICILLIN SODIUM AND SULBACTAM SODIUM 3 G: 2; 1 INJECTION, POWDER, FOR SOLUTION INTRAMUSCULAR; INTRAVENOUS at 15:37

## 2025-05-08 RX ADMIN — ENOXAPARIN SODIUM 30 MG: 100 INJECTION SUBCUTANEOUS at 08:51

## 2025-05-08 RX ADMIN — POLYETHYLENE GLYCOL 3350 17 G: 17 POWDER, FOR SOLUTION ORAL at 08:11

## 2025-05-08 RX ADMIN — POTASSIUM CHLORIDE 40 MEQ: 1500 TABLET, EXTENDED RELEASE ORAL at 16:18

## 2025-05-08 RX ADMIN — OXYCODONE 5 MG: 5 TABLET ORAL at 08:11

## 2025-05-08 RX ADMIN — HYDROCORTISONE SODIUM SUCCINATE 50 MG: 100 INJECTION, POWDER, FOR SOLUTION INTRAMUSCULAR; INTRAVENOUS at 08:11

## 2025-05-08 RX ADMIN — PIPERACILLIN SODIUM AND TAZOBACTAM SODIUM 3.38 G: 3; .375 INJECTION, SOLUTION INTRAVENOUS at 05:53

## 2025-05-08 RX ADMIN — PANTOPRAZOLE SODIUM 40 MG: 40 TABLET, DELAYED RELEASE ORAL at 06:46

## 2025-05-08 RX ADMIN — QUETIAPINE FUMARATE 100 MG: 50 TABLET ORAL at 22:00

## 2025-05-08 RX ADMIN — PIPERACILLIN SODIUM AND TAZOBACTAM SODIUM 3.38 G: 3; .375 INJECTION, SOLUTION INTRAVENOUS at 00:11

## 2025-05-08 RX ADMIN — HYDROCORTISONE SODIUM SUCCINATE 50 MG: 100 INJECTION, POWDER, FOR SOLUTION INTRAMUSCULAR; INTRAVENOUS at 02:40

## 2025-05-08 RX ADMIN — ROSUVASTATIN CALCIUM 40 MG: 40 TABLET, FILM COATED ORAL at 08:12

## 2025-05-08 RX ADMIN — TAMSULOSIN HYDROCHLORIDE 0.8 MG: 0.4 CAPSULE ORAL at 08:11

## 2025-05-08 RX ADMIN — CLINDAMYCIN PHOSPHATE 900 MG: 900 INJECTION, SOLUTION INTRAVENOUS at 06:47

## 2025-05-08 RX ADMIN — AMPICILLIN SODIUM AND SULBACTAM SODIUM 3 G: 2; 1 INJECTION, POWDER, FOR SOLUTION INTRAMUSCULAR; INTRAVENOUS at 22:00

## 2025-05-08 RX ADMIN — AMPICILLIN SODIUM AND SULBACTAM SODIUM 3 G: 2; 1 INJECTION, POWDER, FOR SOLUTION INTRAMUSCULAR; INTRAVENOUS at 10:56

## 2025-05-08 RX ADMIN — INSULIN LISPRO 2 UNITS: 100 INJECTION, SOLUTION INTRAVENOUS; SUBCUTANEOUS at 11:58

## 2025-05-08 RX ADMIN — CLINDAMYCIN PHOSPHATE 900 MG: 900 INJECTION, SOLUTION INTRAVENOUS at 00:11

## 2025-05-08 RX ADMIN — METHOCARBAMOL 1000 MG: 1000 INJECTION, SOLUTION INTRAMUSCULAR; INTRAVENOUS at 00:11

## 2025-05-08 RX ADMIN — EXTENDED PHENYTOIN SODIUM 100 MG: 100 CAPSULE, EXTENDED RELEASE ORAL at 08:11

## 2025-05-08 RX ADMIN — METHOCARBAMOL 1000 MG: 1000 INJECTION, SOLUTION INTRAMUSCULAR; INTRAVENOUS at 22:00

## 2025-05-08 RX ADMIN — METHOCARBAMOL 1000 MG: 1000 INJECTION, SOLUTION INTRAMUSCULAR; INTRAVENOUS at 14:40

## 2025-05-08 RX ADMIN — METHOCARBAMOL 1000 MG: 1000 INJECTION, SOLUTION INTRAMUSCULAR; INTRAVENOUS at 06:46

## 2025-05-08 RX ADMIN — INSULIN LISPRO 1 UNITS: 100 INJECTION, SOLUTION INTRAVENOUS; SUBCUTANEOUS at 16:07

## 2025-05-08 RX ADMIN — ACETAMINOPHEN 1000 MG: 1000 INJECTION, SOLUTION INTRAVENOUS at 00:11

## 2025-05-08 RX ADMIN — POTASSIUM PHOSPHATE, MONOBASIC POTASSIUM PHOSPHATE, DIBASIC 15 MMOL: 224; 236 INJECTION, SOLUTION, CONCENTRATE INTRAVENOUS at 06:06

## 2025-05-08 RX ADMIN — INSULIN LISPRO 1 UNITS: 100 INJECTION, SOLUTION INTRAVENOUS; SUBCUTANEOUS at 08:10

## 2025-05-08 RX ADMIN — POTASSIUM CHLORIDE 40 MEQ: 29.8 INJECTION, SOLUTION INTRAVENOUS at 03:39

## 2025-05-08 RX ADMIN — DULOXETINE HYDROCHLORIDE 60 MG: 60 CAPSULE, DELAYED RELEASE ORAL at 08:12

## 2025-05-08 ASSESSMENT — PAIN SCALES - GENERAL
PAINLEVEL_OUTOF10: 4
PAINLEVEL_OUTOF10: 0 - NO PAIN
PAINLEVEL_OUTOF10: 4

## 2025-05-08 ASSESSMENT — COGNITIVE AND FUNCTIONAL STATUS - GENERAL
TOILETING: A LITTLE
DRESSING REGULAR LOWER BODY CLOTHING: A LITTLE
WALKING IN HOSPITAL ROOM: A LITTLE
MOBILITY SCORE: 18
MOVING FROM LYING ON BACK TO SITTING ON SIDE OF FLAT BED WITH BEDRAILS: A LITTLE
DAILY ACTIVITIY SCORE: 21
STANDING UP FROM CHAIR USING ARMS: A LITTLE
CLIMB 3 TO 5 STEPS WITH RAILING: A LITTLE
HELP NEEDED FOR BATHING: A LITTLE
TURNING FROM BACK TO SIDE WHILE IN FLAT BAD: A LITTLE
MOVING TO AND FROM BED TO CHAIR: A LITTLE

## 2025-05-08 ASSESSMENT — PAIN - FUNCTIONAL ASSESSMENT
PAIN_FUNCTIONAL_ASSESSMENT: 0-10

## 2025-05-08 ASSESSMENT — ACTIVITIES OF DAILY LIVING (ADL)
ADL_ASSISTANCE: INDEPENDENT
BATHING_ASSISTANCE: STAND BY
ADL_ASSISTANCE: INDEPENDENT

## 2025-05-08 ASSESSMENT — PAIN DESCRIPTION - DESCRIPTORS: DESCRIPTORS: ACHING

## 2025-05-08 ASSESSMENT — PAIN DESCRIPTION - LOCATION: LOCATION: BACK

## 2025-05-08 NOTE — PROGRESS NOTES
"Occupational Therapy    Evaluation and Treatment    Patient Name: Orlin Laguerre \"Michael\"  MRN: [id number]  Today's Date: 5/8/2025  Room: 02/02-A  Time Calculation  Start Time: 1309  Stop Time: 1345  Time Calculation (min): 36 min    Assessment  IP OT Assessment  OT Assessment: Pt is a 59 year old male who demonstrates decreased balance and activity tolerance, which impedes occupational performance.  Prognosis: Good  Barriers to Discharge Home: No anticipated barriers  Evaluation/Treatment Tolerance: Patient tolerated treatment well  Medical Staff Made Aware: Yes  End of Session Communication: Bedside nurse  End of Session Patient Position: Up in chair, Alarm on (applied cushion to chair for increased comfort)    Plan:  Inpatient Plan  Treatment Interventions: ADL retraining, Functional transfer training, UE strengthening/ROM, Endurance training, Cognitive reorientation, Patient/family training, Equipment evaluation/education, Neuromuscular reeducation, Compensatory technique education  OT Frequency: 2 times per week  OT Discharge Recommendations: No OT needed after discharge  Equipment Recommended upon Discharge:  (FWW vs cane (per PT discretion))  OT Recommended Transfer Status: Assist of 1  OT - OK to Discharge: Yes  OT Assessment  OT Assessment Results: Decreased ADL status, Decreased endurance, Decreased functional mobility, Decreased IADLs  Prognosis: Good  Evaluation/Treatment Tolerance: Patient tolerated treatment well  Medical Staff Made Aware: Yes    Subjective   Current Problem:  1. 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 Gas gangrene (Multi)  Case Request Operating Room: DEBRIDEMENT, WOUND    Case Request Operating Room: DEBRIDEMENT, WOUND      3. Necrotizing fasciitis due to " microorganism (Multi)  Case Request Operating Room: DEBRIDEMENT, PERINEUM    Case Request Operating Room: DEBRIDEMENT, PERINEUM        General:  Reason for Referral: perineal NSTI with debridement  Past Medical History Relevant to Rehab: Asthma, Samm Gangrene, PTSD, PVD, HLD, HTN, CAD, BPH, chronic pain, depression, DM2  Prior to Session Communication: Bedside nurse  Patient Position Received: Up in chair, Alarm on  Family/Caregiver Present: No  General Comment: Pt seated in chair on arrival, pleasant and agreeable.     Precautions:  Medical Precautions: Fall precautions    Vital Signs:   05/08/25 1309 05/08/25 1345   Vital Signs   Vitals Session Pre OT Post OT   Heart Rate 77 73   Resp 14 18   SpO2 100 % 100 %   /58 112/65   MAP (mmHg)  --  79       Pain:  Pain Assessment  Pain Assessment:  (mild pain in perineal region, not quantified)    Lines/Tubes/Drains:  CVC 05/07/25 Triple lumen (Active)   Number of days: 1       Arterial Line 05/07/25 Right Radial (Active)   Number of days: 1     Objective   Cognition:  Overall Cognitive Status: Within Functional Limits  Orientation Level: Oriented X4     Confusion Assessment Method (CAM)  Acute Onset and Fluctuating Course (1A): No     Home Living:  Type of Home: House  Lives With:  (Niece)  Home Adaptive Equipment: Cane  Home Layout: One level  Home Access: Stairs to enter with rails  Entrance Stairs-Rails: Both  Entrance Stairs-Number of Steps: 2  Bathroom Shower/Tub: Tub/shower unit  Bathroom Equipment: Shower chair without back     Prior Function:  Level of [place]: Independent with ADLs and functional transfers, Independent with homemaking with ambulation  ADL Assistance: Independent  Homemaking Assistance: Independent  Ambulatory Assistance: Independent (Sometimes uses cane when limited by back pain. Reports h/o x1 fall that is directly related to this admission)  Vocational: On disability  Leisure: working on cars, yard work  Hand Dominance:  Right  Prior Function Comments: + driving     ADL:  Eating Assistance: Independent  Grooming Assistance: Independent  Bathing Assistance: Stand by  UE Dressing Assistance: Modified independent (Device)  UE Dressing Deficit:  (increased time and compensatory technique 2/2 R shoulder limitations)  LE Dressing Assistance: Stand by  LE Dressing Deficit: Don/doff R shoe, Don/doff L shoe (figure four)  Toileting Assistance with Device: Minimal  Toileting Deficit:  (anticipated)    Activity Tolerance:  Endurance: Tolerates 10 - 20 min exercise with multiple rests  Early Mobility/Exercise Safety Screen: Proceed with mobilization - No exclusion criteria met    Bed Mobility/Transfers: Bed Mobility  Bed Mobility: No  Functional Mobility  Functional Mobility Performed: Yes  Functional Mobility 1  Comments 1: Pt performed functional mobility > a household distance with FWW and CGA-CS for safety.   and Transfers  Transfer: Yes  Transfer 1  Transfer From 1: Sit to  Transfer to 1: Stand  Transfer Device 1: Walker  Transfer Level of Assistance 1: Close supervision    Sensation:  Light Touch: No apparent deficits    Coordination:  Movements are Fluid and Coordinated: Yes      Extremities:   RUE   RUE :  (Previous rotator cuff repair, shoulder flexion limited to ~100 degrees; otherwise WFL and strength 5/5),   LUE   LUE: Within Functional Limits (5/5),  ,     Treatment Completed On Evaluation:    LE Dressing: pt educated on modified LBD technique for back pain and perineal pain. Demonstrated figure four technique without cues for LBD following education.     Therapeutic Activity 1: During functional mobility task, pt educated on safe object retrieval with FWW and compensatory techniques to avoid bending. Required min A to retrieve pants from low in closet this date.     Outcome Measures: [facility] Daily Activity  Putting on and taking off regular lower body clothing: A little  Bathing (including washing, rinsing, drying): A  little  Putting on and taking off regular upper body clothing: None  Toileting, which includes using toilet, bedpan or urinal: A little  Taking care of personal grooming such as brushing teeth: None  Eating Meals: None  Daily Activity - Total Score: 21        ICU Mobility Screen  Early Mobility/Exercise Safety Screen: Proceed with mobilization - No exclusion criteria met  ICU Mobility Scale: Walking with assistance of 1 person,          Education Documentation  Body Mechanics, taught by Payton Palafox OT at 5/8/2025  3:37 PM.  Learner: Patient  Readiness: Acceptance  Method: Explanation, Demonstration  Response: Verbalizes Understanding  Comment: OT goals/POC, compensatory ADL and IADL techniques    Precautions, taught by Payton Palafox OT at 5/8/2025  3:37 PM.  Learner: Patient  Readiness: Acceptance  Method: Explanation, Demonstration  Response: Verbalizes Understanding  Comment: OT goals/POC, compensatory ADL and IADL techniques    ADL Training, taught by Payton Palafox OT at 5/8/2025  3:37 PM.  Learner: Patient  Readiness: Acceptance  Method: Explanation, Demonstration  Response: Verbalizes Understanding  Comment: OT goals/POC, compensatory ADL and IADL techniques    Education Comments  No comments found.        Goals:     Encounter Problems       Encounter Problems (Active)       ADLs       Patient will perform UB and LB bathing with modified independent level of assistance.       Start:  05/08/25    Expected End:  05/22/25            Patient with complete lower body dressing with modified independent level of assistance donning and doffing all LE clothes  with PRN adaptive equipment        Start:  05/08/25    Expected End:  05/22/25            Patient will complete toileting including hygiene clothing management/hygiene with modified independent level of assistance.       Start:  05/08/25    Expected End:  05/22/25            Pt will complete simulated homemaking tasks,  including laundry, cleaning, medication management, and simple meal prep, including item retrieval/transport tasks mod I without cueing to allow for safe return home to prior living environment        Start:  05/08/25    Expected End:  05/22/25               BALANCE       Pt will increase dynamic standing tolerance to >10 min with MI using LRAD during ADLs/IADLs/leisure task without LOB.        Start:  05/08/25    Expected End:  05/22/25 05/08/25 at 3:38 PM   Payton Palafox, OT   Rehab Office: [phone]

## 2025-05-08 NOTE — CARE PLAN
Problem: Pain - Adult  Goal: Verbalizes/displays adequate comfort level or baseline comfort level  Outcome: Progressing  Flowsheets (Taken 5/8/2025 0958)  Verbalizes/displays adequate comfort level or baseline comfort level:   Encourage patient to monitor pain and request assistance   Administer analgesics based on type and severity of pain and evaluate response   Implement non-pharmacological measures as appropriate and evaluate response   Assess pain using appropriate pain scale     Problem: Safety - Adult  Goal: Free from fall injury  Outcome: Progressing  Flowsheets (Taken 5/8/2025 0958)  Free from fall injury: Based on caregiver fall risk screen, instruct family/caregiver to ask for assistance with transferring infant if caregiver noted to have fall risk factors     Problem: Discharge Planning  Goal: Discharge to home or other facility with appropriate resources  Outcome: Progressing     Problem: Chronic Conditions and Co-morbidities  Goal: Patient's chronic conditions and co-morbidity symptoms are monitored and maintained or improved  Outcome: Progressing  Flowsheets (Taken 5/8/2025 0958)  Care Plan - Patient's Chronic Conditions and Co-Morbidity Symptoms are Monitored and Maintained or Improved:   Collaborate with multidisciplinary team to address chronic and comorbid conditions and prevent exacerbation or deterioration   Update acute care plan with appropriate goals if chronic or comorbid symptoms are exacerbated and prevent overall improvement and discharge   Monitor and assess patient's chronic conditions and comorbid symptoms for stability, deterioration, or improvement     Problem: Nutrition  Goal: Nutrient intake appropriate for maintaining nutritional needs  Outcome: Progressing     Problem: Diabetes  Goal: Achieve decreasing blood glucose levels by end of shift  Outcome: Progressing  Goal: Increase stability of blood glucose readings by end of shift  Outcome: Progressing  Goal: Decrease in ketones  present in urine by end of shift  Outcome: Progressing  Goal: Maintain electrolyte levels within acceptable range throughout shift  Outcome: Progressing  Goal: Maintain glucose levels >70mg/dl to <250mg/dl throughout shift  Outcome: Progressing  Goal: No changes in neurological exam by end of shift  Outcome: Progressing  Goal: Learn about and adhere to nutrition recommendations by end of shift  Outcome: Progressing  Goal: Vital signs within normal range for age by end of shift  Outcome: Progressing  Goal: Increase self care and/or family involovement by end of shift  Outcome: Progressing  Goal: Receive DSME education by end of shift  Outcome: Progressing     Problem: Fall/Injury  Goal: Not fall by end of shift  Outcome: Progressing  Goal: Be free from injury by end of the shift  Outcome: Progressing  Goal: Verbalize understanding of personal risk factors for fall in the hospital  Outcome: Progressing  Goal: Verbalize understanding of risk factor reduction measures to prevent injury from fall in the home  Outcome: Progressing  Goal: Use assistive devices by end of the shift  Outcome: Progressing  Goal: Pace activities to prevent fatigue by end of the shift  Outcome: Progressing     Problem: Skin  Goal: Decreased wound size/increased tissue granulation at next dressing change  Outcome: Progressing  Flowsheets (Taken 5/8/2025 0958)  Decreased wound size/increased tissue granulation at next dressing change: Protective dressings over bony prominences  Goal: Participates in plan/prevention/treatment measures  Outcome: Progressing  Flowsheets (Taken 5/8/2025 0958)  Participates in plan/prevention/treatment measures:   Elevate heels   Discuss with provider PT/OT consult   Increase activity/out of bed for meals  Goal: Prevent/manage excess moisture  Outcome: Progressing  Flowsheets (Taken 5/8/2025 0958)  Prevent/manage excess moisture:   Cleanse incontinence/protect with barrier cream   Follow provider orders for dressing  changes   Moisturize dry skin   Monitor for/manage infection if present  Goal: Prevent/minimize sheer/friction injuries  Outcome: Progressing  Flowsheets (Taken 5/8/2025 0958)  Prevent/minimize sheer/friction injuries:   Complete micro-shifts as needed if patient unable. Adjust patient position to relieve pressure points, not a full turn   HOB 30 degrees or less   Use pull sheet   Turn/reposition every 2 hours/use positioning/transfer devices   Increase activity/out of bed for meals  Goal: Promote/optimize nutrition  Outcome: Progressing  Flowsheets (Taken 5/8/2025 0958)  Promote/optimize nutrition: Monitor/record intake including meals  Goal: Promote skin healing  Outcome: Progressing  Flowsheets (Taken 5/8/2025 0958)  Promote skin healing:   Ensure correct size (line/device) and apply per  instructions   Assess skin/pad under line(s)/device(s)   Turn/reposition every 2 hours/use positioning/transfer devices   Protective dressings over bony prominences

## 2025-05-08 NOTE — PROGRESS NOTES
[facility]  TRAUMA ICU - PROGRESS NOTE    Patient Name: Orlin Laguerre  MRN: [id number]  Admit Date: 506  : 1965  AGE: 59 y.o.   GENDER: male  ======================================================================  MECHANISM OF INJURY:   58 yo M s/p excisional debridement of perineum for NSTI at [facility], [place] 5/6 AM.     LOC (yes/no?): no  Anticoagulant / Anti-platelet Rx? (for what dx?): no  Referring Facility Name (N/A for scene EMR run): [facility], [place]    INJURIES:   Septic shock  NSTI    OTHER MEDICAL PROBLEMS:  Asthma, Samm Gangrene, PTSD, PVD, HLD, HTN, CAD, BPH, chronic pain, depression, DM2,     INCIDENTAL FINDINGS:  N/a    PROCEDURES:  : debridement    ==============================================================================  TODAY'S ASSESSMENT AND PLAN OF CARE:  Orlin Laguerre is a 59 y.o. male in the ICU due to pressor requirements    NEURO/PAIN/SEDATION:   #multimodal pain control  -Analgesia: oxy 5/10, dilaudid prn, tylenol, robaxin  -home Cymbalta and Seroquel    RESPIRATORY:   #asthma hx  -home albuterol     CARDIOVASC:   #septic shock, improving  # HLD  - pressors stopped  -home rosuvastatin    GI:   - consistent carb diet  - bowel regimen: miralax  - PPI daily  -Zofran prn    :   -Schmid in place, will remove and will need TOV  - home flomax    FEN:   #hypophosphatemia  #hypokalemia  -Monitor electrolytes and replete as needed    HEMATOLOGIC:   -H/H stable,  -PRN labs    ENDOCRINE:   #DM2  - SSI  - will discontinue stress dose steroids    MUSCULOSKELETAL/SKIN:   #NSTI  - Per ACS may need to RTOR Fri or Sat  -home phenytoin for muscle spasms in R calf    INFECTIOUS DISEASE:   #leukocytosis  #septic shock  -Will descalate antibiotics to unasyn  -trend CBCs  - wound cultures strep bovis  - BC NGTD X1    GI PROPHYLAXIS: Protonix   DVT PROPHYLAXIS: SCDs, lovenox    DISPOSITION: will monitor in TICU for another 24 hours off pressors, likely floor  tomorrow    Pt seen and discussed with attending Dr. Gisela Staton  TICU  ==============================================================================  CHIEF COMPLAINT / OVERNIGHT EVENTS / HPI:   NAEON. Pt was weaned off of pressors. Pain well controlled    PHYSICAL EXAM:  Heart Rate:  [47-94]   Temp:  [35.9 °C (96.6 °F)-36.6 °C (97.9 °F)]   Resp:  [11-24]   BP: ()/(39-91)   SpO2:  [97 %-100 %]   Physical Exam  Constitutional:       General: He is not in acute distress.     Appearance: He is normal weight.      Comments: On exam patient talkative and resting comfortably in bed   Eyes:      Extraocular Movements: Extraocular movements intact.   Cardiovascular:      Rate and Rhythm: Normal rate and regular rhythm.      Pulses: Normal pulses.   Pulmonary:      Effort: Pulmonary effort is normal.   Abdominal:      General: There is no distension.      Tenderness: There is no abdominal tenderness.   Genitourinary:     Penis: Normal.       Comments: Rectal tube and hanley in place on exam  Musculoskeletal:      Comments: Post-surgical debridement of perineum, moderate amount of necrotic tissue   Neurological:      Mental Status: He is alert and oriented to person, place, and time.         IMAGING SUMMARY:  (summary of new imaging findings, not a copy of dictation)  None new    LABS:  Results from last 7 days   Lab Units 05/08/25  0029 05/07/25  0455 05/06/25  2135 05/06/25  0728 05/05/25  1552   WBC AUTO x10*3/uL 10.1 11.2 18.3*   < > 19.7*   HEMOGLOBIN g/dL 12.3* 11.2* 13.4*   < > 15.1   HEMATOCRIT % 34.8* 32.1* 37.5*   < > 41.7   PLATELETS AUTO x10*3/uL 186 186 235   < > 184   NEUTROS PCT AUTO %  --   --   --   --  89.3   LYMPHS PCT AUTO %  --   --   --   --  3.2   MONOS PCT AUTO %  --   --   --   --  2.6   EOS PCT AUTO %  --   --   --   --  1.0    < > = values in this interval not displayed.     Results from last 7 days   Lab Units 05/06/25  2135   APTT seconds 27   INR  1.1     Results from last 7 days    Lab Units 05/08/25  0029 05/07/25  1454 05/07/25  0455 05/06/25  2135 05/06/25  0728 05/05/25  1552   SODIUM mmol/L 132* 134* 134*   < > 133* 128*   POTASSIUM mmol/L 3.1* 3.4* 3.6   < > 3.4* 3.0*   CHLORIDE mmol/L 99 103 104   < > 102 93*   CO2 mmol/L 23 20* 21   < > 17* 23   BUN mg/dL 28* 24* 22   < > 23 25*   CREATININE mg/dL 1.09 0.95 0.92   < > 1.24 1.92*   CALCIUM mg/dL 7.7* 7.7* 7.5*   < > 7.9* 8.5*   PROTEIN TOTAL g/dL  --   --   --   --  5.7* 6.5   BILIRUBIN TOTAL mg/dL  --   --   --   --  0.8 0.7   ALK PHOS U/L  --   --   --   --  99 100   ALT U/L  --   --   --   --  24 37   AST U/L  --   --   --   --  17 28   GLUCOSE mg/dL 216* 174* 161*   < > 176* 247*    < > = values in this interval not displayed.     Results from last 7 days   Lab Units 05/06/25  0728 05/05/25  1552   BILIRUBIN TOTAL mg/dL 0.8 0.7     Results from last 7 days   Lab Units 05/08/25  0230 05/07/25  0454 05/07/25  0347   POCT PH, ARTERIAL pH 7.38 7.34* 7.35*   POCT PCO2, ARTERIAL mm Hg 40 37* 37*   POCT PO2, ARTERIAL mm Hg 46* 80* 175*   POCT HCO3 CALCULATED, ARTERIAL mmol/L 23.7 20.0* 20.4*   POCT BASE EXCESS, ARTERIAL mmol/L -1.3 -5.2* -4.7*     I have reviewed all medications, laboratory results, and imaging pertinent for today's encounter.         seen discussed  with team  and examined on rounds this AM . Off pressors has ambulated out of room in leal around the unit. Remains extubated  no clinical evidence of respiratiroy distress or infection. improvement in  MS.   Should be ready for RNF discussed with ACS   This critically ill patient no longer continues  to be at-risk for clinically significant deterioration / failure due to the above mentioned dysfunctional, unstable organ systems.  I have personally identified and managed all complex critical care issues to prevent aforementioned clinical deterioration.  Critical care time is spent at bedside and/or the immediate area and has included, but is not limited to, the review of  diagnostic tests, labs, radiographs, serial assessments of hemodynamics, respiratory status, ventilatory management, and family updates.  Time spent in procedures and teaching are reported separately.     CRITICAL CARE TIME: 35 minutes    Alirio Higgins MD

## 2025-05-08 NOTE — PROGRESS NOTES
[facility]  ACUTE CARE SURGERY - PROGRESS NOTE    Patient Name: Orlin Laguerre  MRN: [id number]  Admit Date: 506  : 1965  AGE: 59 y.o.   GENDER: male  ==============================================================================  TODAY'S ASSESSMENT AND PLAN OF CARE:  Orlin Laguerre is a 59 y.o. male with past medical history significant for diabetes who presented as a transfer from [facility] following perineal NSTI with debridement at OSH.    Upon arrival, patient requiring pressor support, taken to TSICU with additional debridement performed .    This AM, patient awake, alert, conversational.  Patient off pressors, hemodynamically stable.  Wound inspected, without evidence of remaining necrotic or threatened tissue.  Labs significant for hyponatremia, otherwise no significant leukocytosis or hemoglobin drop.    At this time, no plan for imminent return to OR. May be appropriate for transfer to floor pending ICU evaluation.    -No plan for immenent return to the OR with ACS today. Will consent for RTOR tomorrow for further debridement  -Continue IV antibiotics  -Okay for floor transfer later this pm      Dispo: Continue current level of care.    Patient's exam, labs, and findings discussed with Dr. Juan, who agrees with the plan as described above.    Galindo Cutler MD  PGY-1 General Surgery  Acute Care Surgery [id number]      Subjective   ==============================================================================  CHIEF COMPLAINT / EVENTS LAST 24HRS / HPI:  No acute events overnight. Patient seen and evaluated this AM during team rounds.  Patient awake, alert, conversational.  No longer on pressors.  Looking forward to getting up, ambulating today.  Able to lift leg at bedside, assist with dressing change.  Perineum wound without significant necrosis or otherwise threatened appearing tissue.    MEDICAL HISTORY / ROS:   Admission history reviewed. Pertinent  changes as follows:  None.       Objective   Vital Signs past 24h:  Heart Rate:  [47-94]   Temp:  [35.9 °C (96.6 °F)-36.6 °C (97.9 °F)]   Resp:  [11-24]   BP: ()/(39-87)   SpO2:  [97 %-100 %]     I/O past 24h:  I/O last 2 completed shifts:  In: 1081.8 (12 mL/kg) [I.V.:181.8 (2 mL/kg); IV Piggyback:900]  Out: 2390 (26.6 mL/kg) [Urine:2390 (1.1 mL/kg/hr)]  Weight: 90 kg      PHYSICAL EXAM:  GEN: No acute distress. Alert, awake and conversive.  HEENT: Sclera anicteric. Moist mucous membranes.  RESP: Breathing non-labored, equal chest rise. On RA.  CV: Regular rate, normotensive  GI: Abdomen soft, nondistended, nontender.  Perennial wound with healthy appearing tissue, appropriate oozing with dressing change.  No threatened or necrotic appearing tissue.  : Voiding spontaneously.  MSK: No gross deformities. Moves all extremities spontaneously.  NEURO: Alert and oriented x3. No focal deficits.  PSYCH: Appropriate mood and affect.  SKIN: No rashes or lesions.    Labs past 24h:  Results for orders placed or performed during the hospital encounter of 05/06/25 (from the past 24 hours)   POCT GLUCOSE   Result Value Ref Range    POCT Glucose 136 (H) 74 - 99 mg/dL   Vancomycin   Result Value Ref Range    Vancomycin 11.0 5.0 - 20.0 ug/mL   POCT GLUCOSE   Result Value Ref Range    POCT Glucose 153 (H) 74 - 99 mg/dL   POCT GLUCOSE   Result Value Ref Range    POCT Glucose 152 (H) 74 - 99 mg/dL   POCT GLUCOSE   Result Value Ref Range    POCT Glucose 140 (H) 74 - 99 mg/dL   POCT GLUCOSE   Result Value Ref Range    POCT Glucose 132 (H) 74 - 99 mg/dL   POCT GLUCOSE   Result Value Ref Range    POCT Glucose 140 (H) 74 - 99 mg/dL   Renal Function Panel   Result Value Ref Range    Glucose 174 (H) 74 - 99 mg/dL    Sodium 134 (L) 136 - 145 mmol/L    Potassium 3.4 (L) 3.5 - 5.3 mmol/L    Chloride 103 98 - 107 mmol/L    Bicarbonate 20 (L) 21 - 32 mmol/L    Anion Gap 14 10 - 20 mmol/L    Urea Nitrogen 24 (H) 6 - 23 mg/dL    Creatinine  0.95 0.50 - 1.30 mg/dL    eGFR >90 >60 mL/min/1.73m*2    Calcium 7.7 (L) 8.6 - 10.6 mg/dL    Phosphorus 2.7 2.5 - 4.9 mg/dL    Albumin 2.7 (L) 3.4 - 5.0 g/dL   POCT GLUCOSE   Result Value Ref Range    POCT Glucose 161 (H) 74 - 99 mg/dL   POCT GLUCOSE   Result Value Ref Range    POCT Glucose 163 (H) 74 - 99 mg/dL   POCT GLUCOSE   Result Value Ref Range    POCT Glucose 180 (H) 74 - 99 mg/dL   POCT GLUCOSE   Result Value Ref Range    POCT Glucose 191 (H) 74 - 99 mg/dL   POCT GLUCOSE   Result Value Ref Range    POCT Glucose 207 (H) 74 - 99 mg/dL   POCT GLUCOSE   Result Value Ref Range    POCT Glucose 222 (H) 74 - 99 mg/dL   POCT GLUCOSE   Result Value Ref Range    POCT Glucose 211 (H) 74 - 99 mg/dL   POCT GLUCOSE   Result Value Ref Range    POCT Glucose 193 (H) 74 - 99 mg/dL   POCT GLUCOSE   Result Value Ref Range    POCT Glucose 197 (H) 74 - 99 mg/dL   CBC   Result Value Ref Range    WBC 10.1 4.4 - 11.3 x10*3/uL    nRBC 0.0 0.0 - 0.0 /100 WBCs    RBC 4.05 (L) 4.50 - 5.90 x10*6/uL    Hemoglobin 12.3 (L) 13.5 - 17.5 g/dL    Hematocrit 34.8 (L) 41.0 - 52.0 %    MCV 86 80 - 100 fL    MCH 30.4 26.0 - 34.0 pg    MCHC 35.3 32.0 - 36.0 g/dL    RDW 12.6 11.5 - 14.5 %    Platelets 186 150 - 450 x10*3/uL   Magnesium   Result Value Ref Range    Magnesium 2.17 1.60 - 2.40 mg/dL   Renal Function Panel   Result Value Ref Range    Glucose 216 (H) 74 - 99 mg/dL    Sodium 132 (L) 136 - 145 mmol/L    Potassium 3.1 (L) 3.5 - 5.3 mmol/L    Chloride 99 98 - 107 mmol/L    Bicarbonate 23 21 - 32 mmol/L    Anion Gap 13 10 - 20 mmol/L    Urea Nitrogen 28 (H) 6 - 23 mg/dL    Creatinine 1.09 0.50 - 1.30 mg/dL    eGFR 79 >60 mL/min/1.73m*2    Calcium 7.7 (L) 8.6 - 10.6 mg/dL    Phosphorus 2.2 (L) 2.5 - 4.9 mg/dL    Albumin 2.7 (L) 3.4 - 5.0 g/dL   Blood Gas Arterial Full Panel   Result Value Ref Range    POCT pH, Arterial 7.38 7.38 - 7.42 pH    POCT pCO2, Arterial 40 38 - 42 mm Hg    POCT pO2, Arterial 46 (L) 85 - 95 mm Hg    POCT SO2, Arterial 79  (L) 94 - 100 %    POCT Oxy Hemoglobin, Arterial 76.8 (L) 94.0 - 98.0 %    POCT Hematocrit Calculated, Arterial 35.0 (L) 41.0 - 52.0 %    POCT Sodium, Arterial      POCT Potassium, Arterial 2.7 (LL) 3.5 - 5.3 mmol/L    POCT Chloride, Arterial 104 98 - 107 mmol/L    POCT Ionized Calcium, Arterial 1.19 1.10 - 1.33 mmol/L    POCT Glucose, Arterial 175 (H) 74 - 99 mg/dL    POCT Lactate, Arterial 0.7 0.4 - 2.0 mmol/L    POCT Base Excess, Arterial -1.3 -2.0 - 3.0 mmol/L    POCT HCO3 Calculated, Arterial 23.7 22.0 - 26.0 mmol/L    POCT Hemoglobin, Arterial 11.6 (L) 13.5 - 17.5 g/dL    POCT Anion Gap, Arterial      Patient Temperature 37.0 degrees Celsius    FiO2 21 %   POCT GLUCOSE   Result Value Ref Range    POCT Glucose 160 (H) 74 - 99 mg/dL   POCT GLUCOSE   Result Value Ref Range    POCT Glucose 132 (H) 74 - 99 mg/dL   POCT GLUCOSE   Result Value Ref Range    POCT Glucose 159 (H) 74 - 99 mg/dL     *Note: Due to a large number of results and/or encounters for the requested time period, some results have not been displayed. A complete set of results can be found in Results Review.       Imaging within past 24h:  Imaging  XR chest 1 view  Result Date: 5/7/2025  1.  No evidence of acute cardiopulmonary process. 2. Interval placement of right IJ approach central venous catheter with tip overlying the lower superior vena cava.   I personally reviewed the images/study with Solitario Ledezma MD (Radiology Resident) and I agree with the findings as stated.   MACRO: None   Signed by: Jonathan Hoover 5/7/2025 9:06 AM Dictation workstation:   [id number]      Cardiology, Vascular, and Other Imaging  No other imaging results found for the past 2 days      I have reviewed the imaging above as it pertains to the patient's surgical concerns and agree with the radiologist's interpretation.

## 2025-05-08 NOTE — PROGRESS NOTES
Physical Therapy    Physical Therapy Evaluation & Treatment    Patient Name: Michael Laguerre  MRN: [id number]  Department: [facility]  Room: 02/02-A  Today's Date: 5/8/2025   Time Calculation  Start Time: 1021  Stop Time: 1054  Time Calculation (min): 33 min    Assessment/Plan   PT Assessment  PT Assessment Results: Decreased strength, Decreased range of motion, Decreased endurance, Impaired balance, Decreased mobility  Rehab Prognosis: Excellent  Barriers to Discharge Home: No anticipated barriers  Evaluation/Treatment Tolerance: Patient tolerated treatment well  Medical Staff Made Aware: Yes  End of Session Communication: Bedside nurse  Assessment Comment: Pt is a 58 yo male admitted for NSTI requiring debridement. Upon PT eval, pt moving at CGA level with FWW. Skilled PT indicated to progress toward IND/Mod I baseline.  End of Session Patient Position: Up in chair, Alarm off, not on at start of session   IP OR SWING BED PT PLAN  Inpatient or Swing Bed: Inpatient  PT Plan  Treatment/Interventions: Bed mobility, Transfer training, Gait training, Stair training, Balance training, Neuromuscular re-education, Strengthening, Endurance training, Therapeutic exercise, Therapeutic activity, Home exercise program  PT Plan: Ongoing PT  PT Frequency: 3 times per week  PT Discharge Recommendations: Low intensity level of continued care  Equipment Recommended upon Discharge: Wheeled walker  PT Recommended Transfer Status: Assist x1, Assistive device  PT - OK to Discharge: Yes      Subjective     General Visit Information:  General  Reason for Referral: perineal NSTI with debridement  Past Medical History Relevant to Rehab: Asthma, Samm Gangrene, PTSD, PVD, HLD, HTN, CAD, BPH, chronic pain, depression, DM2  Prior to Session Communication: Bedside nurse  Patient Position Received: Bed, 3 rail up  General Comment: Pt alert and agreeable, off pressors. On tele, IV, a-line, RA.  Home Living:  Home Living  Type of Home:  House  Lives With:  (niece)  Home Adaptive Equipment: Cane  Home Layout: One level  Home Access:  (1 step to porch + 1 step to enter home)  Bathroom Shower/Tub: Tub/shower unit  Bathroom Equipment: Grab bars in shower, Shower chair with back  Prior Level of Function:  Prior Function Per Pt/Caregiver Report  Level of [place]: Independent with ADLs and functional transfers, Independent with homemaking with ambulation  ADL Assistance: Independent  Homemaking Assistance: Independent  Ambulatory Assistance: Independent (Sometimes uses cane when limited by back pain. Reports h/o x1 fall)  Prior Function Comments: + driving  Precautions:  Precautions  Medical Precautions: Fall precautions     05/08/25 1021 05/08/25 1054   Vital Signs   Vitals Session Pre PT Post PT   Heart Rate 75 77   Resp 21  --    SpO2 99 % 99 %   /58 109/84       Vital Signs Comment: VSS throughout mobility    Objective   Pain:  Pain Assessment  Pain Assessment: 0-10  0-10 (Numeric) Pain Score: 0 - No pain  Cognition:  Cognition  Overall Cognitive Status: Within Functional Limits  Orientation Level: Oriented X4    General Assessments:  Activity Tolerance  Endurance: Tolerates 10 - 20 min exercise with multiple rests  Early Mobility/Exercise Safety Screen: Proceed with mobilization - No exclusion criteria met    Sensation  Light Touch: No apparent deficits    Coordination  Movements are Fluid and Coordinated: Yes    Postural Control  Postural Control: Within Functional Limits  Head Control: WFL    Static Sitting Balance  Static Sitting-Balance Support: Feet supported  Static Sitting-Level of Assistance: Close supervision  Dynamic Sitting Balance  Dynamic Sitting-Balance Support: Feet supported  Dynamic Sitting-Level of Assistance: Close supervision    Static Standing Balance  Static Standing-Balance Support: Bilateral upper extremity supported  Static Standing-Level of Assistance: Close supervision  Dynamic Standing Balance  Dynamic  Standing-Balance Support: Bilateral upper extremity supported  Dynamic Standing-Level of Assistance: Contact guard  Functional Assessments:       Bed Mobility  Bed Mobility: Yes  Bed Mobility 1  Bed Mobility 1: Supine to sitting  Level of Assistance 1: Contact guard  Bed Mobility Comments 1: HOB elevated    Transfers  Transfer: Yes  Transfer 1  Technique 1: Sit to stand, Stand to sit  Transfer Device 1: Walker  Transfer Level of Assistance 1: Contact guard  Trials/Comments 1: Cues for hand placement safety with FWW, increased effort required to come to stand    Ambulation/Gait Training  Ambulation/Gait Training Performed: Yes  Ambulation/Gait Training 1  Surface 1: Level tile  Device 1: Rolling walker  Assistance 1: Contact guard  Quality of Gait 1: Shuffling gait  Comments/Distance (ft) 1: x12 ft in room. Cues provided to push FWW rather than picking it up and picking it up was causing increased fatigue and decreased balance.        Extremity/Trunk Assessments:    RUE   RUE : Within Functional Limits (Appropriate functional use but pt reports ROM limitation at baseline from prior injury)  LUE   LUE: Within Functional Limits  RLE   RLE : Within Functional Limits  LLE   LLE : Within Functional Limits  Treatments:  Ambulation/Gait Training  Ambulation/Gait Training 2  Surface 2: Level tile  Device 2: Rolling walker  Assistance 2: Contact guard  Comments/Distance (ft) 2: x100 ft with VSS. Improved safety/fluidity of gait compared to initial bout.    Transfers  Transfers 2  Technique 2: Sit to stand, Stand to sit  Transfer Device 2: Walker  Transfer Level of Assistance 2: Contact guard  Trials/Comments 2: Good carryover of cues for hand placement, improved ease of transfer  Outcome Measures:  AMPAC Basic Mobility  Turning from your back to your side while in a flat bed without using bedrails: A little  Moving from lying on your back to sitting on the side of a flat bed without using bedrails: A little  Moving to and  from bed to chair (including a wheelchair): A little  Standing up from a chair using your arms (e.g. wheelchair or bedside chair): A little  To walk in hospital room: A little  Climbing 3-5 steps with railing: A little  Basic Mobility - Total Score: 18    FSS-ICU  Ambulation: Walks >/ or equal to 150 feet with minimal assistance x1  Rolling: Minimal assistance (performs 75% or more of task)  Sitting: Supervision or set-up only  Transfer Sit-to-Stand: Minimal assistance (performs 75% or more of task)  Transfer Supine-to-Sit: Minimal assistance (performs 75% or more of task)  Total Score: 21    Early Mobility/Exercise Safety Screen: Proceed with mobilization - No exclusion criteria met  ICU Mobility Scale: Walking with assistance of 1 person [8]    Encounter Problems       Encounter Problems (Active)       Balance       Pt will score >/= 25/28 on the Tinetti Balance Assessment for low risk for falls        Start:  05/08/25    Expected End:  05/29/25               Mobility       STG - Patient will ambulate x300 ft with LRAD with Mod I        Start:  05/08/25    Expected End:  05/29/25            STG - Patient will ambulate up and down a curb/step with LRAD with Mod I        Start:  05/08/25    Expected End:  05/29/25               PT Transfers       STG - Patient will perform bed mobility independently        Start:  05/08/25    Expected End:  05/29/25            STG - Patient will transfer sit to and from stand with LRAD with Mod I        Start:  05/08/25    Expected End:  05/29/25               Pain - Adult              Education Documentation  Precautions, taught by La Nena Campoverde PT at 5/8/2025  2:21 PM.  Learner: Patient  Readiness: Acceptance  Method: Explanation  Response: Verbalizes Understanding, Demonstrated Understanding  Comment: PT POC, use of call light for assist for safety    Body Mechanics, taught by La Nena Campoverde PT at 5/8/2025  2:21 PM.  Learner: Patient  Readiness: Acceptance  Method:  Explanation  Response: Verbalizes Understanding, Demonstrated Understanding  Comment: PT POC, use of call light for assist for safety    Mobility Training, taught by La Nena Campoverde PT at 5/8/2025  2:21 PM.  Learner: Patient  Readiness: Acceptance  Method: Explanation  Response: Verbalizes Understanding, Demonstrated Understanding  Comment: PT POC, use of call light for assist for safety    Education Comments  No comments found.    La Nena Campoverde PT, DPT
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
## 2025-05-09 ENCOUNTER — ANESTHESIA (OUTPATIENT)
Dept: OPERATING ROOM | Facility: HOSPITAL | Age: 60
End: 2025-05-09
Payer: MEDICARE

## 2025-05-09 LAB
GLUCOSE BLD MANUAL STRIP-MCNC: 127 MG/DL (ref 74–99)
GLUCOSE BLD MANUAL STRIP-MCNC: 128 MG/DL (ref 74–99)
GLUCOSE BLD MANUAL STRIP-MCNC: 167 MG/DL (ref 74–99)
GLUCOSE BLD MANUAL STRIP-MCNC: 214 MG/DL (ref 74–99)

## 2025-05-09 PROCEDURE — 2500000004 HC RX 250 GENERAL PHARMACY W/ HCPCS (ALT 636 FOR OP/ED): Mod: TB

## 2025-05-09 PROCEDURE — 2500000001 HC RX 250 WO HCPCS SELF ADMINISTERED DRUGS (ALT 637 FOR MEDICARE OP): Performed by: STUDENT IN AN ORGANIZED HEALTH CARE EDUCATION/TRAINING PROGRAM

## 2025-05-09 PROCEDURE — 82947 ASSAY GLUCOSE BLOOD QUANT: CPT

## 2025-05-09 PROCEDURE — 2500000004 HC RX 250 GENERAL PHARMACY W/ HCPCS (ALT 636 FOR OP/ED): Mod: JZ,TB | Performed by: STUDENT IN AN ORGANIZED HEALTH CARE EDUCATION/TRAINING PROGRAM

## 2025-05-09 PROCEDURE — 11042 DBRDMT SUBQ TIS 1ST 20SQCM/<: CPT | Performed by: SURGERY

## 2025-05-09 PROCEDURE — 3600000003 HC OR TIME - INITIAL BASE CHARGE - PROCEDURE LEVEL THREE: Performed by: SURGERY

## 2025-05-09 PROCEDURE — 2500000004 HC RX 250 GENERAL PHARMACY W/ HCPCS (ALT 636 FOR OP/ED): Mod: JZ

## 2025-05-09 PROCEDURE — 2500000004 HC RX 250 GENERAL PHARMACY W/ HCPCS (ALT 636 FOR OP/ED): Mod: JZ | Performed by: STUDENT IN AN ORGANIZED HEALTH CARE EDUCATION/TRAINING PROGRAM

## 2025-05-09 PROCEDURE — 3700000002 HC GENERAL ANESTHESIA TIME - EACH INCREMENTAL 1 MINUTE: Performed by: SURGERY

## 2025-05-09 PROCEDURE — 7100000002 HC RECOVERY ROOM TIME - EACH INCREMENTAL 1 MINUTE: Performed by: SURGERY

## 2025-05-09 PROCEDURE — 2500000002 HC RX 250 W HCPCS SELF ADMINISTERED DRUGS (ALT 637 FOR MEDICARE OP, ALT 636 FOR OP/ED)

## 2025-05-09 PROCEDURE — 3700000001 HC GENERAL ANESTHESIA TIME - INITIAL BASE CHARGE: Performed by: SURGERY

## 2025-05-09 PROCEDURE — 7100000001 HC RECOVERY ROOM TIME - INITIAL BASE CHARGE: Performed by: SURGERY

## 2025-05-09 PROCEDURE — 2500000001 HC RX 250 WO HCPCS SELF ADMINISTERED DRUGS (ALT 637 FOR MEDICARE OP)

## 2025-05-09 PROCEDURE — 99024 POSTOP FOLLOW-UP VISIT: CPT | Performed by: SURGERY

## 2025-05-09 PROCEDURE — 0JBB0ZZ EXCISION OF PERINEUM SUBCUTANEOUS TISSUE AND FASCIA, OPEN APPROACH: ICD-10-PCS

## 2025-05-09 PROCEDURE — 2500000005 HC RX 250 GENERAL PHARMACY W/O HCPCS: Mod: JZ | Performed by: SURGERY

## 2025-05-09 PROCEDURE — 3600000008 HC OR TIME - EACH INCREMENTAL 1 MINUTE - PROCEDURE LEVEL THREE: Performed by: SURGERY

## 2025-05-09 PROCEDURE — 2720000007 HC OR 272 NO HCPCS: Performed by: SURGERY

## 2025-05-09 PROCEDURE — 1200000002 HC GENERAL ROOM WITH TELEMETRY DAILY

## 2025-05-09 RX ORDER — HYDROMORPHONE HYDROCHLORIDE 1 MG/ML
INJECTION, SOLUTION INTRAMUSCULAR; INTRAVENOUS; SUBCUTANEOUS AS NEEDED
Status: DISCONTINUED | OUTPATIENT
Start: 2025-05-09 | End: 2025-05-09

## 2025-05-09 RX ORDER — SODIUM CHLORIDE 0.9 G/100ML
INJECTION, SOLUTION IRRIGATION AS NEEDED
Status: DISCONTINUED | OUTPATIENT
Start: 2025-05-09 | End: 2025-05-09 | Stop reason: HOSPADM

## 2025-05-09 RX ORDER — OXYCODONE HYDROCHLORIDE 5 MG/1
10 TABLET ORAL EVERY 4 HOURS PRN
Status: DISCONTINUED | OUTPATIENT
Start: 2025-05-09 | End: 2025-05-09 | Stop reason: HOSPADM

## 2025-05-09 RX ORDER — ONDANSETRON HYDROCHLORIDE 2 MG/ML
4 INJECTION, SOLUTION INTRAVENOUS ONCE AS NEEDED
Status: DISCONTINUED | OUTPATIENT
Start: 2025-05-09 | End: 2025-05-09 | Stop reason: HOSPADM

## 2025-05-09 RX ORDER — ACETAMINOPHEN 325 MG/1
650 TABLET ORAL EVERY 4 HOURS PRN
Status: DISCONTINUED | OUTPATIENT
Start: 2025-05-09 | End: 2025-05-09 | Stop reason: HOSPADM

## 2025-05-09 RX ORDER — LIDOCAINE HCL/PF 100 MG/5ML
SYRINGE (ML) INTRAVENOUS AS NEEDED
Status: DISCONTINUED | OUTPATIENT
Start: 2025-05-09 | End: 2025-05-09

## 2025-05-09 RX ORDER — LABETALOL HYDROCHLORIDE 5 MG/ML
5 INJECTION, SOLUTION INTRAVENOUS ONCE AS NEEDED
Status: DISCONTINUED | OUTPATIENT
Start: 2025-05-09 | End: 2025-05-09 | Stop reason: HOSPADM

## 2025-05-09 RX ORDER — ONDANSETRON HYDROCHLORIDE 2 MG/ML
INJECTION, SOLUTION INTRAVENOUS AS NEEDED
Status: DISCONTINUED | OUTPATIENT
Start: 2025-05-09 | End: 2025-05-09

## 2025-05-09 RX ORDER — DROPERIDOL 2.5 MG/ML
0.62 INJECTION, SOLUTION INTRAMUSCULAR; INTRAVENOUS ONCE AS NEEDED
Status: DISCONTINUED | OUTPATIENT
Start: 2025-05-09 | End: 2025-05-09 | Stop reason: HOSPADM

## 2025-05-09 RX ORDER — ROCURONIUM BROMIDE 10 MG/ML
INJECTION, SOLUTION INTRAVENOUS AS NEEDED
Status: DISCONTINUED | OUTPATIENT
Start: 2025-05-09 | End: 2025-05-09

## 2025-05-09 RX ORDER — ALBUTEROL SULFATE 0.83 MG/ML
2.5 SOLUTION RESPIRATORY (INHALATION) ONCE AS NEEDED
Status: DISCONTINUED | OUTPATIENT
Start: 2025-05-09 | End: 2025-05-09 | Stop reason: HOSPADM

## 2025-05-09 RX ORDER — OXYCODONE HYDROCHLORIDE 5 MG/1
5 TABLET ORAL EVERY 4 HOURS PRN
Status: DISCONTINUED | OUTPATIENT
Start: 2025-05-09 | End: 2025-05-09 | Stop reason: HOSPADM

## 2025-05-09 RX ORDER — SODIUM CHLORIDE, SODIUM LACTATE, POTASSIUM CHLORIDE, CALCIUM CHLORIDE 600; 310; 30; 20 MG/100ML; MG/100ML; MG/100ML; MG/100ML
100 INJECTION, SOLUTION INTRAVENOUS CONTINUOUS
Status: ACTIVE | OUTPATIENT
Start: 2025-05-09 | End: 2025-05-10

## 2025-05-09 RX ORDER — MIDAZOLAM HYDROCHLORIDE 1 MG/ML
INJECTION INTRAMUSCULAR; INTRAVENOUS AS NEEDED
Status: DISCONTINUED | OUTPATIENT
Start: 2025-05-09 | End: 2025-05-09

## 2025-05-09 RX ORDER — FENTANYL CITRATE 50 UG/ML
INJECTION, SOLUTION INTRAMUSCULAR; INTRAVENOUS AS NEEDED
Status: DISCONTINUED | OUTPATIENT
Start: 2025-05-09 | End: 2025-05-09

## 2025-05-09 RX ORDER — PROPOFOL 10 MG/ML
INJECTION, EMULSION INTRAVENOUS AS NEEDED
Status: DISCONTINUED | OUTPATIENT
Start: 2025-05-09 | End: 2025-05-09

## 2025-05-09 RX ORDER — HYDRALAZINE HYDROCHLORIDE 20 MG/ML
5 INJECTION INTRAMUSCULAR; INTRAVENOUS EVERY 30 MIN PRN
Status: DISCONTINUED | OUTPATIENT
Start: 2025-05-09 | End: 2025-05-09 | Stop reason: HOSPADM

## 2025-05-09 RX ADMIN — PIPERACILLIN SODIUM AND TAZOBACTAM SODIUM 3.38 G: 3; .375 INJECTION, SOLUTION INTRAVENOUS at 20:46

## 2025-05-09 RX ADMIN — SODIUM CHLORIDE, SODIUM LACTATE, POTASSIUM CHLORIDE, AND CALCIUM CHLORIDE: .6; .31; .03; .02 INJECTION, SOLUTION INTRAVENOUS at 16:37

## 2025-05-09 RX ADMIN — PANTOPRAZOLE SODIUM 40 MG: 40 TABLET, DELAYED RELEASE ORAL at 07:07

## 2025-05-09 RX ADMIN — METHOCARBAMOL 1000 MG: 1000 INJECTION, SOLUTION INTRAMUSCULAR; INTRAVENOUS at 07:06

## 2025-05-09 RX ADMIN — AMPICILLIN SODIUM AND SULBACTAM SODIUM 3 G: 2; 1 INJECTION, POWDER, FOR SOLUTION INTRAMUSCULAR; INTRAVENOUS at 12:42

## 2025-05-09 RX ADMIN — ROSUVASTATIN CALCIUM 40 MG: 40 TABLET, FILM COATED ORAL at 08:53

## 2025-05-09 RX ADMIN — OXYCODONE 10 MG: 5 TABLET ORAL at 17:39

## 2025-05-09 RX ADMIN — ROCURONIUM BROMIDE 60 MG: 10 INJECTION, SOLUTION INTRAVENOUS at 16:37

## 2025-05-09 RX ADMIN — FENTANYL CITRATE 50 MCG: 50 INJECTION, SOLUTION INTRAMUSCULAR; INTRAVENOUS at 16:37

## 2025-05-09 RX ADMIN — ONDANSETRON 4 MG: 2 INJECTION, SOLUTION INTRAMUSCULAR; INTRAVENOUS at 17:05

## 2025-05-09 RX ADMIN — SUGAMMADEX 400 MG: 100 INJECTION, SOLUTION INTRAVENOUS at 17:05

## 2025-05-09 RX ADMIN — TAMSULOSIN HYDROCHLORIDE 0.8 MG: 0.4 CAPSULE ORAL at 08:53

## 2025-05-09 RX ADMIN — HYDROMORPHONE HYDROCHLORIDE 0.5 MG: 1 INJECTION, SOLUTION INTRAMUSCULAR; INTRAVENOUS; SUBCUTANEOUS at 17:01

## 2025-05-09 RX ADMIN — EXTENDED PHENYTOIN SODIUM 100 MG: 100 CAPSULE, EXTENDED RELEASE ORAL at 08:53

## 2025-05-09 RX ADMIN — OXYCODONE 5 MG: 5 TABLET ORAL at 08:53

## 2025-05-09 RX ADMIN — HYDROMORPHONE HYDROCHLORIDE 0.5 MG: 1 INJECTION, SOLUTION INTRAMUSCULAR; INTRAVENOUS; SUBCUTANEOUS at 17:25

## 2025-05-09 RX ADMIN — QUETIAPINE FUMARATE 100 MG: 50 TABLET ORAL at 20:46

## 2025-05-09 RX ADMIN — AMPICILLIN SODIUM AND SULBACTAM SODIUM 3 G: 2; 1 INJECTION, POWDER, FOR SOLUTION INTRAMUSCULAR; INTRAVENOUS at 07:06

## 2025-05-09 RX ADMIN — LIDOCAINE HYDROCHLORIDE 40 MG: 20 INJECTION INTRAVENOUS at 16:37

## 2025-05-09 RX ADMIN — HYDROMORPHONE HYDROCHLORIDE 0.5 MG: 1 INJECTION, SOLUTION INTRAMUSCULAR; INTRAVENOUS; SUBCUTANEOUS at 17:36

## 2025-05-09 RX ADMIN — PROPOFOL 150 MG: 10 INJECTION, EMULSION INTRAVENOUS at 16:37

## 2025-05-09 RX ADMIN — PIPERACILLIN SODIUM AND TAZOBACTAM SODIUM 3.38 G: 3; .375 INJECTION, SOLUTION INTRAVENOUS at 16:43

## 2025-05-09 RX ADMIN — DULOXETINE HYDROCHLORIDE 60 MG: 60 CAPSULE, DELAYED RELEASE ORAL at 08:53

## 2025-05-09 RX ADMIN — MIDAZOLAM HYDROCHLORIDE 2 MG: 2 INJECTION, SOLUTION INTRAMUSCULAR; INTRAVENOUS at 16:24

## 2025-05-09 ASSESSMENT — PAIN SCALES - GENERAL
PAINLEVEL_OUTOF10: 7
PAINLEVEL_OUTOF10: 3
PAINLEVEL_OUTOF10: 7
PAINLEVEL_OUTOF10: 4
PAINLEVEL_OUTOF10: 0 - NO PAIN
PAIN_LEVEL: 3
PAINLEVEL_OUTOF10: 7

## 2025-05-09 ASSESSMENT — COGNITIVE AND FUNCTIONAL STATUS - GENERAL
DRESSING REGULAR LOWER BODY CLOTHING: A LITTLE
WALKING IN HOSPITAL ROOM: A LITTLE
CLIMB 3 TO 5 STEPS WITH RAILING: A LITTLE
CLIMB 3 TO 5 STEPS WITH RAILING: A LITTLE
MOBILITY SCORE: 22
MOBILITY SCORE: 22
STANDING UP FROM CHAIR USING ARMS: A LITTLE
MOVING TO AND FROM BED TO CHAIR: A LITTLE
MOBILITY SCORE: 18
DAILY ACTIVITIY SCORE: 24
MOVING FROM LYING ON BACK TO SITTING ON SIDE OF FLAT BED WITH BEDRAILS: A LITTLE
DAILY ACTIVITIY SCORE: 24
PERSONAL GROOMING: A LITTLE
DAILY ACTIVITIY SCORE: 19
WALKING IN HOSPITAL ROOM: A LITTLE
DRESSING REGULAR UPPER BODY CLOTHING: A LITTLE
CLIMB 3 TO 5 STEPS WITH RAILING: A LITTLE
TOILETING: A LITTLE
WALKING IN HOSPITAL ROOM: A LITTLE
HELP NEEDED FOR BATHING: A LITTLE
TURNING FROM BACK TO SIDE WHILE IN FLAT BAD: A LITTLE

## 2025-05-09 ASSESSMENT — PAIN - FUNCTIONAL ASSESSMENT
PAIN_FUNCTIONAL_ASSESSMENT: 0-10

## 2025-05-09 ASSESSMENT — PAIN DESCRIPTION - DESCRIPTORS: DESCRIPTORS: SORE

## 2025-05-09 NOTE — H&P
Please see H&P from 5/7/25 from Dr. Mario Bowman.       The patient was seen and examined this morning. There are no pertinent changes to the H&P. Plan to further debride perineal wound.      Pattie Parada MD  General Surgery, PGY-1  Acute Care Surgery [id number]

## 2025-05-09 NOTE — ANESTHESIA POSTPROCEDURE EVALUATION
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    Anesthesia Post Evaluation    Patient location during evaluation: PACU  Patient participation: complete - patient participated  Level of consciousness: awake  Pain score: 3  Pain management: satisfactory to patient  Airway patency: patent  Cardiovascular status: acceptable and hemodynamically stable  Respiratory status: acceptable, face mask and nonlabored ventilation  Hydration status: acceptable  Postoperative Nausea and Vomiting: none        There were no known notable events for this encounter.    "

## 2025-05-09 NOTE — PERIOPERATIVE NURSING NOTE
1715 Pt arrived to PACU, alert and able to answer questions and follow commands. Pt shows no signs of distress. Will continue to monitor.     1830 Report given to nurse on [facility] tower 8. Pt stable. Transport requested.     1847 Pt in route to room. Pt stable.     Shereen Ayala RN

## 2025-05-09 NOTE — ANESTHESIA PROCEDURE NOTES
Airway  Date/Time: 5/9/2025 4:40 PM  Reason: elective    Airway not difficult    Staffing  Performed: SHOSHANA   Authorized by: La Nena Lr MD    Performed by: SHOSHANA Henriquez  Patient location during procedure: OR    Patient Condition  Indications for airway management: anesthesia and airway protection  Patient position: sniffing  Sedation level: deep     Final Airway Details   Preoxygenated: yes  Final airway type: endotracheal airway  Successful airway: ETT  Cuffed: yes   Successful intubation technique: direct laryngoscopy  Adjuncts used in placement: intubating stylet and cricoid pressure  Endotracheal tube insertion site: oral  Blade: Rosemarie  Blade size: #4  ETT size (mm): 7.5  Cormack-Lehane Classification: grade I - full view of glottis  Placement verified by: chest auscultation and capnometry   Measured from: lips  ETT to lips (cm): 22  Number of attempts at approach: 1  Number of other approaches attempted: 0

## 2025-05-09 NOTE — OP NOTE
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]     Procedures  DEBRIDEMENT, PERINEAL WOUND  03821 - MA DEBRIDEMENT BONE 1ST 20 SQ CM/<      Surgeons      * Cande Velasco - Primary    Resident/Fellow/Other Assistant:  Surgeons and Role:     * Aleida Sierra MD - Resident - Assisting    Staff:   Valentin: Anabela Duong Person: Paula    Anesthesia Staff: Anesthesiologist: Lester Daniels DO  Anesthesia Resident: Jose Carlos Garcia MD    Procedure Summary  Anesthesia: General  ASA: III  Estimated Blood Loss: 10mL  Intra-op Medications:   Administrations occurring from 0215 to 0430 on 25:   Medication Name Total Dose   sodium chloride 0.9 % irrigation solution 1,000 mL   dextrose 50 % injection 12.5 g Cannot be calculated   dextrose 50 % injection 25 g Cannot be calculated   glucagon (Glucagen) injection 1 mg Cannot be calculated   glucagon (Glucagen) injection 1 mg Cannot be calculated   insulin regular (HumuLIN, NovoLIN) bolus from bag 0-10 Units 2 Units   clindamycin (Cleocin) 900 mg in dextrose 5% IV 50 mL Cannot be calculated   dextrose 50 % injection 12.5 g Cannot be calculated   dextrose 50 % injection 25 g Cannot be calculated   fentaNYL (Sublimaze) injection 50 mcg/mL 100 mcg   glucagon (Glucagen) injection 1 mg Cannot be calculated   glucagon (Glucagen) injection 1 mg Cannot be calculated   hydrocortisone sodium succinate (PF) (Solu-CORTEF) injection 50 mg Cannot be calculated   insulin regular (HumuLIN, NovoLIN) bolus from bag 2-6 Units Cannot be calculated   insulin regular 100 unit/100 mL (1 unit/mL) in 0.9 % NaCl infusion 2.97 Units   LR bolus Cannot be calculated "   lidocaine (Xylocaine) injection 2 % 80 mg   midazolam PF (Versed) injection 1 mg/mL 1 mg   norepinephrine (Levophed) 16 mcg/mL IV bolus 16 mcg   piperacillin-tazobactam (Zosyn) 3.375 g in dextrose (iso) IV 50 mL Cannot be calculated   propofol (Diprivan) injection 10 mg/mL 150 mg   rocuronium (ZeMuron) 50 mg/5 mL injection 50 mg   sugammadex (Bridion) 200 mg/2 mL injection 200 mg   vancomycin (Vancocin) 1,000 mg in dextrose 5%  mL Cannot be calculated   vancomycin (Vancocin) pharmacy to dose - pharmacy monitoring Cannot be calculated   vasopressin (Vasostrict) 0.2 unit/mL in 5% dextrose 100 mL IV 2.28 Units              Anesthesia Record               Intraprocedure I/O Totals          Intake    LR bolus 1000.00 mL    Norepinephrine Drip 0.00 mL    The total shown is the total volume documented since Anesthesia Start was filed.    Vasopressin Drip 0.00 mL    The total shown is the total volume documented since Anesthesia Start was filed.    Total Intake 1000 mL          Specimen: No specimens collected              Drains and/or Catheters:   [REMOVED] Fecal Management Rectal tube with balloon (Removed)   Site Assessment Clean;Skin intact 05/06/25 1600   Patency Intact 05/06/25 1600   Cuff/Balloon Volume 45 mL 05/06/25 1600   Rectal Tube I/O Output only 05/06/25 1600       [REMOVED] Urethral Catheter Non-latex 16 Fr. (Removed)   Present on Admission to Healthcare Facility N 05/07/25 0500   Site Assessment Clean;Skin intact 05/08/25 0800   Collection Container Standard drainage bag 05/08/25 0800   Securement Method Securing device (Describe) 05/08/25 0800   Reason for Continuing Urinary Catheterization accurate hourly measurement of urine volume in a critically ill patient that cannot be assessed by other volumes and urine collection strategies 05/08/25 0800   Output (mL) 275 mL 05/08/25 1000       Findings: Grossly viable perineal wound with no evidence of deeper necrosis or abscess. Skin and superficial  "subcutaneous tissue debrided.     Indications: Orlin Laguerre \"Jeri" is an 59 y.o. 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 For review, patient recently underwent excisional debridement of the perineal necrotizing soft tissue infection in the setting of septic shock and poorly controlled diabetes mellitus.  Patient initially [facility] this evening. Upon presentation to the ICU, the patient was alert and oriented yet hypotensive requiring levophed infusion. As he did not respond to fluid resuscitation, a return to the OR was recommended for further evaluation and debridement.     The patient was seen in the preoperative area. The risks, benefits, complications, treatment options, non-operative alternatives, expected recovery and outcomes were discussed with the patient. The possibilities of reaction to medication, pulmonary aspiration, injury to surrounding structures, bleeding, recurrent infection, the need for additional procedures, failure to diagnose a condition, and creating a complication requiring transfusion or operation were discussed with the patient. The patient concurred with the proposed plan, giving informed consent.  The site of surgery was properly noted/marked if necessary per policy. The patient has been actively warmed in preoperative area. Preoperative antibiotics have been ordered and given within 1 hours of incision. Venous thrombosis prophylaxis have been ordered including bilateral sequential compression devices    Procedure Details: The patient was taken to the operative suite where he was placed in the supine position and underwent induction of general anesthesia without complications. He was then positioned in lithotomy and prepped and draped in the typical sterile fashion. Prior to incision, a time-out was observed by the surgical team verifying the patient's identity, positioning, and type of " procedure to be performed. A cursory inspection of the wound revealed some necrotic superficial subcutaneous tissue yet no eva abscess. The exposed testicle was viable. The right inguinal canal was inspected and free of pus and eva tissue necrosis.  Electrocautery was used to excise necrotic skin along the medial and lateral aspects of the wound. In addition, the necrotic subcutaneous tissue was sharply excised with scissors. A small amount of bleeding was encountered, yet easily controlled with electrocautery. Following debridement, the wound was irrigated with sterile saline and inspected once more for hemostasis. The wound was packed with saline-moistened Kerlix and covered with ABD pads. Upon completion of the case, the patient was repositioned supine and revived from anesthesia. He was transferred of the OR table and transported to the TSICU in critical condition.     Evidence of Infection: Yes; Purulent fluid Within the subcutaneous tissues and skin.   Complications:  None; patient tolerated the procedure well.    Disposition: ICU   Condition: unstable     Attending Attestation: I was present and scrubbed for the entire procedure.    Cande Velasco  Phone Number: [phone]

## 2025-05-09 NOTE — OP NOTE
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     EUA UNDER ANESTHESIA  47015 - [place] DEBRIDEMENT MUSCLE &/FASCIA 1ST 20 SQ CM/<    EUA UNDER ANESTHESIA  41890 - [place] DEBRIDEMENT SUBCUTANEOUS TISSUE 1ST 20 SQ CM/<      Surgeons      * Lino Juan - Primary    Resident/Fellow/Other Assistant:  Surgeons and Role:     * Enma Kennedy MD - Resident - Assisting     * Adali Ryan MD - Resident - Assisting    Staff:   Circulator: Susan  Circulator: Tangela  Scrub Person: Akash Christopherub Person: Brenda    Anesthesia Staff: Anesthesiologist: La Nena Lr MD; Minnie Veras MD  C-AA: SHOSHANA Henriquez  Anesthesia Resident: Grecia Dominique MD    Procedure Summary  Anesthesia: General  ASA: III  Estimated Blood Loss: 5mL  Intra-op Medications:   Administrations occurring from 1440 to 1640 on 25:   Medication Name Total Dose   albuterol 90 mcg/actuation inhaler 2 puff Cannot be calculated   ampicillin-sulbactam (Unasyn) 3 g in sodium chloride 0.9%  mL Cannot be calculated   dextrose 50 % injection 12.5 g Cannot be calculated   dextrose 50 % injection 25 g Cannot be calculated   DULoxetine (Cymbalta) DR capsule 60 mg Cannot be calculated   enoxaparin (Lovenox) syringe 40 mg Cannot be calculated   fentaNYL (Sublimaze) injection 50 mcg/mL 50 mcg   glucagon (Glucagen) injection 1 mg Cannot be calculated   glucagon (Glucagen) injection 1 mg Cannot be calculated   HYDROmorphone (Dilaudid) injection 0.4 mg Cannot be calculated   insulin lispro injection " "0-5 Units Cannot be calculated   lactated Ringer's infusion Cannot be calculated   lidocaine (cardiac) injection 2% prefilled syringe 40 mg   methocarbamol (Robaxin) injection 1,000 mg Cannot be calculated   midazolam PF (Versed) injection 1 mg/mL 2 mg   naloxone (Narcan) injection 0.2 mg Cannot be calculated   oxyCODONE (Roxicodone) immediate release tablet 10 mg Cannot be calculated   oxyCODONE (Roxicodone) immediate release tablet 5 mg Cannot be calculated   pantoprazole (ProtoNix) EC tablet 40 mg Cannot be calculated   phenytoin ER (Dilantin) capsule 100 mg Cannot be calculated   polyethylene glycol (Glycolax, Miralax) packet 17 g Cannot be calculated   propofol (Diprivan) injection 10 mg/mL 150 mg   QUEtiapine (SEROquel) tablet 100 mg Cannot be calculated   rocuronium (ZeMuron) 50 mg/5 mL injection 60 mg   rosuvastatin (Crestor) tablet 40 mg Cannot be calculated   tamsulosin (Flomax) 24 hr capsule 0.8 mg Cannot be calculated              Anesthesia Record               Intraprocedure I/O Totals          Intake    piperacillin-tazobactam (Zosyn) IV 3.375 g in 50 mL (premix) 50.00 mL    Total Intake 50 mL          Specimen: No specimens collected              Drains and/or Catheters: * None in log *      Findings: Predominantly viable perineal wound with limited evidence of necrosis. Skin and superficial cutaneous tissue lightly debrided.     Indications: Orlin Laguerre \"Michael\" is an 59 y.o. 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 Patient recently underwent excisional debridement for necrotizing soft tissue infection in setting of septic shock and poorly controlled diabetes. Patient returned to operating room today for additional examination and debridement of wound.     The patient was seen in the preoperative area. The risks, benefits, complications, treatment options, non-operative alternatives, expected recovery and " outcomes were discussed with the patient. The possibilities of reaction to medication, pulmonary aspiration, injury to surrounding structures, bleeding, recurrent infection, the need for additional procedures, failure to diagnose a condition, and creating a complication requiring transfusion or operation were discussed with the patient. The patient concurred with the proposed plan, giving informed consent.  The site of surgery was properly noted/marked if necessary per policy. The patient has been actively warmed in preoperative area. Preoperative antibiotics have been ordered and given within 1 hours of incision. Venous thrombosis prophylaxis have been ordered including bilateral sequential compression devices    Procedure Details:   The patient was taken to the operative suite where he was placed in the supine position and underwent induction of general anesthesia without complications. He was then positioned in lithotomy and prepped and draped in typical sterile fashion. Prior to incision, a second time-out was observed by the surgical team verifying the patient's identity, positioning, and type of procedure to be performed. Upon inspection of the wound there were few areas of devitalized strictly subcutaneous tissue that were removed with combination of cautery and sharp debridement. This was less than 20cm2. The wound was inspected closely in all quadrants with no additional necrotic tissue or pockets visualized. The wound was then packed with saline-moistened Kerlix and covered with ABD pads. Upon completion of the case the patient was repositioned supine and extubated. Patient was then transferred off of the operating room table and transported to PACU in satisfactory condition.     Evidence of Infection: No   Complications:  None; patient tolerated the procedure well.    Disposition: PACU - hemodynamically stable.  Condition: stable       Additional Details: N/A    Attending Attestation: I was present for  and supervised the entirety of the operation. I made some edits to the above dictation for clarity.    Lino Juan  Phone Number: [phone]

## 2025-05-09 NOTE — CARE PLAN
The patient's goals for the shift include      The clinical goals for the shift include pain control       Problem: Pain - Adult  Goal: Verbalizes/displays adequate comfort level or baseline comfort level  Outcome: Progressing     Problem: Safety - Adult  Goal: Free from fall injury  Outcome: Progressing     Problem: Discharge Planning  Goal: Discharge to home or other facility with appropriate resources  Outcome: Progressing     Problem: Chronic Conditions and Co-morbidities  Goal: Patient's chronic conditions and co-morbidity symptoms are monitored and maintained or improved  Outcome: Progressing     Problem: Nutrition  Goal: Nutrient intake appropriate for maintaining nutritional needs  Outcome: Progressing     Problem: Diabetes  Goal: Achieve decreasing blood glucose levels by end of shift  Outcome: Progressing  Goal: Increase stability of blood glucose readings by end of shift  Outcome: Progressing  Goal: Decrease in ketones present in urine by end of shift  Outcome: Progressing  Goal: Maintain electrolyte levels within acceptable range throughout shift  Outcome: Progressing  Goal: Maintain glucose levels >70mg/dl to <250mg/dl throughout shift  Outcome: Progressing  Goal: No changes in neurological exam by end of shift  Outcome: Progressing  Goal: Learn about and adhere to nutrition recommendations by end of shift  Outcome: Progressing  Goal: Vital signs within normal range for age by end of shift  Outcome: Progressing  Goal: Increase self care and/or family involovement by end of shift  Outcome: Progressing  Goal: Receive DSME education by end of shift  Outcome: Progressing     Problem: Fall/Injury  Goal: Not fall by end of shift  Outcome: Progressing  Goal: Be free from injury by end of the shift  Outcome: Progressing  Goal: Verbalize understanding of personal risk factors for fall in the hospital  Outcome: Progressing  Goal: Verbalize understanding of risk factor reduction measures to prevent injury from  fall in the home  Outcome: Progressing  Goal: Use assistive devices by end of the shift  Outcome: Progressing  Goal: Pace activities to prevent fatigue by end of the shift  Outcome: Progressing     Problem: Skin  Goal: Decreased wound size/increased tissue granulation at next dressing change  Outcome: Progressing  Flowsheets (Taken 5/9/2025 0202)  Decreased wound size/increased tissue granulation at next dressing change: Protective dressings over bony prominences  Goal: Participates in plan/prevention/treatment measures  Outcome: Progressing  Flowsheets (Taken 5/9/2025 0202)  Participates in plan/prevention/treatment measures:   Elevate heels   Increase activity/out of bed for meals   Discuss with provider PT/OT consult  Goal: Prevent/manage excess moisture  Outcome: Progressing  Flowsheets (Taken 5/9/2025 0202)  Prevent/manage excess moisture:   Cleanse incontinence/protect with barrier cream   Follow provider orders for dressing changes   Monitor for/manage infection if present  Goal: Prevent/minimize sheer/friction injuries  Outcome: Progressing  Flowsheets (Taken 5/9/2025 0202)  Prevent/minimize sheer/friction injuries:   Complete micro-shifts as needed if patient unable. Adjust patient position to relieve pressure points, not a full turn   HOB 30 degrees or less   Increase activity/out of bed for meals   Utilize specialty bed per algorithm   Turn/reposition every 2 hours/use positioning/transfer devices   Use pull sheet  Goal: Promote/optimize nutrition  Outcome: Progressing  Flowsheets (Taken 5/9/2025 0202)  Promote/optimize nutrition:   Monitor/record intake including meals   Offer water/supplements/favorite foods  Goal: Promote skin healing  Outcome: Progressing  Flowsheets (Taken 5/9/2025 0202)  Promote skin healing:   Assess skin/pad under line(s)/device(s)   Ensure correct size (line/device) and apply per  instructions   Rotate device position/do not position patient on device   Protective  dressings over bony prominences   Turn/reposition every 2 hours/use positioning/transfer devices

## 2025-05-09 NOTE — PROGRESS NOTES
Spiritual Care Visit  Spiritual Care Request    Reason for Visit:  Routine Visit: Introduction  Continue Visiting: No     Request Received From:       Focus of Care:  Visited With: Patient and family together         Refer to :  Referral To:        Spiritual Care Assessment    Spiritual Assessment:                      Care Provided:       Sense of Community and or Jainism Affiliation:  Rastafari         Addressed Needs/Concerns and/or Janel Through:  Jainism Encounters  Jainism Needs: Prayer  Sacramental Encounters  Communion: Does not want communion  Other Sacrament: P&B    Outcome:        Advance Directives:         Spiritual Care Annotation    Annotation:  Patient received a prayer and blessing (P&B) by Fr. Darrel Hassan  Rastafari .  Family members were preset.

## 2025-05-10 LAB
BACTERIA BLD CULT: NORMAL
BACTERIA BLD CULT: NORMAL
GLUCOSE BLD MANUAL STRIP-MCNC: 177 MG/DL (ref 74–99)
GLUCOSE BLD MANUAL STRIP-MCNC: 209 MG/DL (ref 74–99)
GLUCOSE BLD MANUAL STRIP-MCNC: 210 MG/DL (ref 74–99)
GLUCOSE BLD MANUAL STRIP-MCNC: 211 MG/DL (ref 74–99)

## 2025-05-10 PROCEDURE — 82947 ASSAY GLUCOSE BLOOD QUANT: CPT

## 2025-05-10 PROCEDURE — 2500000004 HC RX 250 GENERAL PHARMACY W/ HCPCS (ALT 636 FOR OP/ED): Mod: JZ

## 2025-05-10 PROCEDURE — 2500000002 HC RX 250 W HCPCS SELF ADMINISTERED DRUGS (ALT 637 FOR MEDICARE OP, ALT 636 FOR OP/ED)

## 2025-05-10 PROCEDURE — 2500000001 HC RX 250 WO HCPCS SELF ADMINISTERED DRUGS (ALT 637 FOR MEDICARE OP)

## 2025-05-10 PROCEDURE — 1200000002 HC GENERAL ROOM WITH TELEMETRY DAILY

## 2025-05-10 RX ORDER — ACETAMINOPHEN 325 MG/1
650 TABLET ORAL EVERY 6 HOURS
Status: DISCONTINUED | OUTPATIENT
Start: 2025-05-10 | End: 2025-05-12 | Stop reason: HOSPADM

## 2025-05-10 RX ADMIN — INSULIN LISPRO 1 UNITS: 100 INJECTION, SOLUTION INTRAVENOUS; SUBCUTANEOUS at 13:12

## 2025-05-10 RX ADMIN — ACETAMINOPHEN 650 MG: 325 TABLET ORAL at 08:51

## 2025-05-10 RX ADMIN — PANTOPRAZOLE SODIUM 40 MG: 40 TABLET, DELAYED RELEASE ORAL at 06:25

## 2025-05-10 RX ADMIN — EXTENDED PHENYTOIN SODIUM 100 MG: 100 CAPSULE, EXTENDED RELEASE ORAL at 08:51

## 2025-05-10 RX ADMIN — ENOXAPARIN SODIUM 40 MG: 100 INJECTION SUBCUTANEOUS at 08:50

## 2025-05-10 RX ADMIN — ACETAMINOPHEN 650 MG: 325 TABLET ORAL at 20:27

## 2025-05-10 RX ADMIN — TAMSULOSIN HYDROCHLORIDE 0.8 MG: 0.4 CAPSULE ORAL at 08:51

## 2025-05-10 RX ADMIN — ROSUVASTATIN CALCIUM 40 MG: 40 TABLET, FILM COATED ORAL at 08:51

## 2025-05-10 RX ADMIN — ACETAMINOPHEN 650 MG: 325 TABLET ORAL at 15:39

## 2025-05-10 RX ADMIN — PIPERACILLIN SODIUM AND TAZOBACTAM SODIUM 3.38 G: 3; .375 INJECTION, SOLUTION INTRAVENOUS at 03:11

## 2025-05-10 RX ADMIN — METHOCARBAMOL 1000 MG: 1000 INJECTION, SOLUTION INTRAMUSCULAR; INTRAVENOUS at 00:38

## 2025-05-10 RX ADMIN — DULOXETINE HYDROCHLORIDE 60 MG: 60 CAPSULE, DELAYED RELEASE ORAL at 08:50

## 2025-05-10 RX ADMIN — QUETIAPINE FUMARATE 100 MG: 50 TABLET ORAL at 21:46

## 2025-05-10 RX ADMIN — OXYCODONE HYDROCHLORIDE 10 MG: 10 TABLET ORAL at 06:26

## 2025-05-10 RX ADMIN — PIPERACILLIN SODIUM AND TAZOBACTAM SODIUM 3.38 G: 3; .375 INJECTION, SOLUTION INTRAVENOUS at 08:50

## 2025-05-10 RX ADMIN — OXYCODONE HYDROCHLORIDE 10 MG: 10 TABLET ORAL at 15:42

## 2025-05-10 RX ADMIN — PIPERACILLIN SODIUM AND TAZOBACTAM SODIUM 3.38 G: 3; .375 INJECTION, SOLUTION INTRAVENOUS at 15:39

## 2025-05-10 RX ADMIN — OXYCODONE HYDROCHLORIDE 10 MG: 10 TABLET ORAL at 20:28

## 2025-05-10 RX ADMIN — METHOCARBAMOL 1000 MG: 1000 INJECTION, SOLUTION INTRAMUSCULAR; INTRAVENOUS at 06:25

## 2025-05-10 RX ADMIN — INSULIN LISPRO 2 UNITS: 100 INJECTION, SOLUTION INTRAVENOUS; SUBCUTANEOUS at 10:03

## 2025-05-10 RX ADMIN — PIPERACILLIN SODIUM AND TAZOBACTAM SODIUM 3.38 G: 3; .375 INJECTION, SOLUTION INTRAVENOUS at 20:27

## 2025-05-10 RX ADMIN — INSULIN LISPRO 2 UNITS: 100 INJECTION, SOLUTION INTRAVENOUS; SUBCUTANEOUS at 18:37

## 2025-05-10 ASSESSMENT — PAIN - FUNCTIONAL ASSESSMENT
PAIN_FUNCTIONAL_ASSESSMENT: 0-10

## 2025-05-10 ASSESSMENT — PAIN SCALES - GENERAL
PAINLEVEL_OUTOF10: 6
PAINLEVEL_OUTOF10: 10 - WORST POSSIBLE PAIN
PAINLEVEL_OUTOF10: 5 - MODERATE PAIN
PAINLEVEL_OUTOF10: 6
PAINLEVEL_OUTOF10: 7
PAINLEVEL_OUTOF10: 8

## 2025-05-10 ASSESSMENT — PAIN DESCRIPTION - DESCRIPTORS: DESCRIPTORS: ACHING;THROBBING

## 2025-05-10 ASSESSMENT — PAIN DESCRIPTION - LOCATION: LOCATION: BUTTOCKS

## 2025-05-10 NOTE — SIGNIFICANT EVENT
S:    POD 0 from debridement. Patient states he is feeling well and has no concerns at this time.     O:   Vital signs are stable, normotensive, afebrile, no new or worsening oxygen requirement, not tachycardic  Visit Vitals  /64 (BP Location: Left arm, Patient Position: Sitting)   Pulse 91   Temp 36.1 °C (97 °F) (Temporal)   Resp 19        Constitutional: no acute distress  Skin: warm and dry overall   Neuro: A/O x4, no gross deficits   HEENT: Atraumatic, no scleral icterus  Cardiac: RRR  Pulmonary: Unlabored respirations   Abdomen: Non distended, non tender  GI: Voiding  Surgical Site: Dressing clean dry and intact with minimal amounts of strikethrough, appropriately tender. No signs of hemorrhage on dressing.     A/P:  Overall, patient is doing well postoperatively with no acute concerns.  Will continue to optimize pain control as needed.  Will continue to monitor clinical exam, vitals, I&O's, and labs when available.  Will follow up on the patient in the a.m. or sooner as needed.

## 2025-05-10 NOTE — PROGRESS NOTES
[facility]  ACUTE CARE SURGERY - PROGRESS NOTE    Patient Name: Orlin Laguerre  MRN: [id number]  Admit Date: 506  : 1965  AGE: 59 y.o.   GENDER: male  ==============================================================================  TODAY'S ASSESSMENT AND PLAN OF CARE:  Orlin Laguerre is a 59 y.o. male with past medical history significant for diabetes who presented as a transfer from [facility] following perineal NSTI with debridement at OSH. Upon arrival, patient requiring pressor support, taken to TSICU with additional debridement performed . Had final planned debridement on  with wound appearing well.     Plan:  Neuro:   -Tylenol, PRN oxycodone   -Continue home duloxetine, phenytoin, Seroquel    CV:  -Monitor vitals  -Continue home rosuvastatin     Resp:  -IS/OOB  -Wean to RA     GI:   -Consistent carb diet   -PPI   -Bowel reg     :   -Continue Flomax     Heme:   -Monitor CBC as clinically indicated     Endo:  -SSI     ID:   -Continue Zosyn (Tentative stop date of )  -Wound appeared well on debridement on ; continue BID WTD dressing changes; no plans for additional operative debridement at this time     Dispo: Niece to get additional training on dressing changes to determine possibility of home with home health vs SNF. Likely here through weekend.     Seen and d/w Dr. Drake Ryan MD  PGY-2 General Surgery  Acute Care Surgery [id number]      Subjective   ==============================================================================  CHIEF COMPLAINT / EVENTS LAST 24HRS / HPI:  No acute events overnight. Wound appears well this morning. No acute complaints.     MEDICAL HISTORY / ROS:   Admission history reviewed. Pertinent changes as follows:  None.       Objective   Vital Signs past 24h:  Heart Rate:  [74-91]   Temp:  [36 °C (96.8 °F)-36.6 °C (97.9 °F)]   Resp:  [12-19]   BP: ()/(51-77)   SpO2:  [94 %-100 %]     I/O past 24h:  I/O  last 2 completed shifts:  In: 410 (4.6 mL/kg) [P.O.:60; I.V.:300 (3.3 mL/kg); IV Piggyback:50]  Out: 5 (0.1 mL/kg) [Blood:5]  Weight: 90 kg      PHYSICAL EXAM:  GEN: No acute distress. Alert, awake and conversive.  HEENT: Sclera anicteric. Moist mucous membranes.  RESP: Breathing non-labored, equal chest rise. On RA.  CV: Regular rate, normotensive  GI: Abdomen soft, nondistended, nontender.  Perennial wound with healthy appearing tissue, .  No threatened or necrotic appearing tissue.  : Voiding spontaneously.  MSK: No gross deformities. Moves all extremities spontaneously.  NEURO: Alert and oriented x3. No focal deficits.  PSYCH: Appropriate mood and affect.  SKIN: No rashes or lesions.    Labs past 24h:  Results for orders placed or performed during the hospital encounter of 05/06/25 (from the past 24 hours)   POCT GLUCOSE   Result Value Ref Range    POCT Glucose 167 (H) 74 - 99 mg/dL   POCT GLUCOSE   Result Value Ref Range    POCT Glucose 127 (H) 74 - 99 mg/dL   POCT GLUCOSE   Result Value Ref Range    POCT Glucose 214 (H) 74 - 99 mg/dL     *Note: Due to a large number of results and/or encounters for the requested time period, some results have not been displayed. A complete set of results can be found in Results Review.       Imaging within past 24h:  Imaging  No results found.      Cardiology, Vascular, and Other Imaging  No other imaging results found for the past 2 days      I have reviewed the imaging above as it pertains to the patient's surgical concerns and agree with the radiologist's interpretation.

## 2025-05-10 NOTE — CARE PLAN
The patient's goals for the shift include      The clinical goals for the shift include pt will remain safe       Problem: Pain - Adult  Goal: Verbalizes/displays adequate comfort level or baseline comfort level  Outcome: Progressing     Problem: Safety - Adult  Goal: Free from fall injury  Outcome: Progressing     Problem: Discharge Planning  Goal: Discharge to home or other facility with appropriate resources  Outcome: Progressing     Problem: Chronic Conditions and Co-morbidities  Goal: Patient's chronic conditions and co-morbidity symptoms are monitored and maintained or improved  Outcome: Progressing     Problem: Nutrition  Goal: Nutrient intake appropriate for maintaining nutritional needs  Outcome: Progressing     Problem: Diabetes  Goal: Achieve decreasing blood glucose levels by end of shift  Outcome: Progressing  Goal: Increase stability of blood glucose readings by end of shift  Outcome: Progressing  Goal: Decrease in ketones present in urine by end of shift  Outcome: Progressing  Goal: Maintain electrolyte levels within acceptable range throughout shift  Outcome: Progressing  Goal: Maintain glucose levels >70mg/dl to <250mg/dl throughout shift  Outcome: Progressing  Goal: No changes in neurological exam by end of shift  Outcome: Progressing  Goal: Learn about and adhere to nutrition recommendations by end of shift  Outcome: Progressing  Goal: Vital signs within normal range for age by end of shift  Outcome: Progressing  Goal: Increase self care and/or family involovement by end of shift  Outcome: Progressing  Goal: Receive DSME education by end of shift  Outcome: Progressing     Problem: Fall/Injury  Goal: Not fall by end of shift  Outcome: Progressing  Goal: Be free from injury by end of the shift  Outcome: Progressing  Goal: Verbalize understanding of personal risk factors for fall in the hospital  Outcome: Progressing  Goal: Verbalize understanding of risk factor reduction measures to prevent injury  from fall in the home  Outcome: Progressing  Goal: Use assistive devices by end of the shift  Outcome: Progressing  Goal: Pace activities to prevent fatigue by end of the shift  Outcome: Progressing     Problem: Skin  Goal: Decreased wound size/increased tissue granulation at next dressing change  Outcome: Progressing  Flowsheets (Taken 5/10/2025 0104)  Decreased wound size/increased tissue granulation at next dressing change: Protective dressings over bony prominences  Goal: Participates in plan/prevention/treatment measures  Outcome: Progressing  Flowsheets (Taken 5/10/2025 0104)  Participates in plan/prevention/treatment measures:   Elevate heels   Discuss with provider PT/OT consult   Increase activity/out of bed for meals  Goal: Prevent/manage excess moisture  Outcome: Progressing  Flowsheets (Taken 5/10/2025 0104)  Prevent/manage excess moisture:   Cleanse incontinence/protect with barrier cream   Follow provider orders for dressing changes   Monitor for/manage infection if present  Goal: Prevent/minimize sheer/friction injuries  Outcome: Progressing  Flowsheets (Taken 5/10/2025 0104)  Prevent/minimize sheer/friction injuries:   Complete micro-shifts as needed if patient unable. Adjust patient position to relieve pressure points, not a full turn   Increase activity/out of bed for meals   Turn/reposition every 2 hours/use positioning/transfer devices   HOB 30 degrees or less   Use pull sheet   Utilize specialty bed per algorithm  Goal: Promote/optimize nutrition  Outcome: Progressing  Flowsheets (Taken 5/10/2025 0104)  Promote/optimize nutrition:   Offer water/supplements/favorite foods   Monitor/record intake including meals  Goal: Promote skin healing  Outcome: Progressing  Flowsheets (Taken 5/10/2025 0104)  Promote skin healing:   Ensure correct size (line/device) and apply per  instructions   Assess skin/pad under line(s)/device(s)   Protective dressings over bony prominences   Turn/reposition  every 2 hours/use positioning/transfer devices   Rotate device position/do not position patient on device

## 2025-05-10 NOTE — CARE PLAN
Problem: Pain - Adult  Goal: Verbalizes/displays adequate comfort level or baseline comfort level  Outcome: Progressing     Problem: Safety - Adult  Goal: Free from fall injury  Outcome: Progressing     Problem: Discharge Planning  Goal: Discharge to home or other facility with appropriate resources  Outcome: Progressing     Problem: Chronic Conditions and Co-morbidities  Goal: Patient's chronic conditions and co-morbidity symptoms are monitored and maintained or improved  Outcome: Progressing     Problem: Nutrition  Goal: Nutrient intake appropriate for maintaining nutritional needs  Outcome: Progressing     Problem: Diabetes  Goal: Achieve decreasing blood glucose levels by end of shift  Outcome: Progressing  Goal: Increase stability of blood glucose readings by end of shift  Outcome: Progressing     Problem: Fall/Injury  Goal: Not fall by end of shift  Outcome: Progressing  Goal: Be free from injury by end of the shift  Outcome: Progressing     Problem: Skin  Goal: Promote/optimize nutrition  Outcome: Progressing  Goal: Promote skin healing  Outcome: Progressing   The patient's goals for the shift include discharge planning     The clinical goals for the shift include pt will remain HDS

## 2025-05-11 VITALS
DIASTOLIC BLOOD PRESSURE: 59 MMHG | HEIGHT: 71 IN | SYSTOLIC BLOOD PRESSURE: 95 MMHG | RESPIRATION RATE: 18 BRPM | BODY MASS INDEX: 27.78 KG/M2 | HEART RATE: 89 BPM | WEIGHT: 198.41 LBS | TEMPERATURE: 97.9 F | OXYGEN SATURATION: 100 %

## 2025-05-11 LAB
ALBUMIN SERPL BCP-MCNC: 2.6 G/DL (ref 3.4–5)
ALBUMIN SERPL BCP-MCNC: 3.2 G/DL (ref 3.4–5)
ANION GAP SERPL CALC-SCNC: 11 MMOL/L (ref 10–20)
ANION GAP SERPL CALC-SCNC: 11 MMOL/L (ref 10–20)
BACTERIA BLD CULT: NORMAL
BACTERIA BLD CULT: NORMAL
BUN SERPL-MCNC: 8 MG/DL (ref 6–23)
BUN SERPL-MCNC: 9 MG/DL (ref 6–23)
CALCIUM SERPL-MCNC: 7.6 MG/DL (ref 8.6–10.6)
CALCIUM SERPL-MCNC: 8.1 MG/DL (ref 8.6–10.6)
CHLORIDE SERPL-SCNC: 101 MMOL/L (ref 98–107)
CHLORIDE SERPL-SCNC: 99 MMOL/L (ref 98–107)
CO2 SERPL-SCNC: 25 MMOL/L (ref 21–32)
CO2 SERPL-SCNC: 28 MMOL/L (ref 21–32)
CREAT SERPL-MCNC: 0.67 MG/DL (ref 0.5–1.3)
CREAT SERPL-MCNC: 0.74 MG/DL (ref 0.5–1.3)
EGFRCR SERPLBLD CKD-EPI 2021: >90 ML/MIN/1.73M*2
EGFRCR SERPLBLD CKD-EPI 2021: >90 ML/MIN/1.73M*2
ERYTHROCYTE [DISTWIDTH] IN BLOOD BY AUTOMATED COUNT: 13.1 % (ref 11.5–14.5)
GLUCOSE BLD MANUAL STRIP-MCNC: 185 MG/DL (ref 74–99)
GLUCOSE BLD MANUAL STRIP-MCNC: 210 MG/DL (ref 74–99)
GLUCOSE BLD MANUAL STRIP-MCNC: 228 MG/DL (ref 74–99)
GLUCOSE SERPL-MCNC: 124 MG/DL (ref 74–99)
GLUCOSE SERPL-MCNC: 183 MG/DL (ref 74–99)
HCT VFR BLD AUTO: 34.4 % (ref 41–52)
HGB BLD-MCNC: 11.8 G/DL (ref 13.5–17.5)
MAGNESIUM SERPL-MCNC: 1.87 MG/DL (ref 1.6–2.4)
MCH RBC QN AUTO: 29.9 PG (ref 26–34)
MCHC RBC AUTO-ENTMCNC: 34.3 G/DL (ref 32–36)
MCV RBC AUTO: 87 FL (ref 80–100)
NRBC BLD-RTO: 0 /100 WBCS (ref 0–0)
PHOSPHATE SERPL-MCNC: 2.5 MG/DL (ref 2.5–4.9)
PHOSPHATE SERPL-MCNC: 2.6 MG/DL (ref 2.5–4.9)
PLATELET # BLD AUTO: 268 X10*3/UL (ref 150–450)
POTASSIUM SERPL-SCNC: 3.2 MMOL/L (ref 3.5–5.3)
POTASSIUM SERPL-SCNC: 3.4 MMOL/L (ref 3.5–5.3)
RBC # BLD AUTO: 3.94 X10*6/UL (ref 4.5–5.9)
SODIUM SERPL-SCNC: 132 MMOL/L (ref 136–145)
SODIUM SERPL-SCNC: 137 MMOL/L (ref 136–145)
WBC # BLD AUTO: 10 X10*3/UL (ref 4.4–11.3)

## 2025-05-11 PROCEDURE — 80069 RENAL FUNCTION PANEL: CPT

## 2025-05-11 PROCEDURE — 99024 POSTOP FOLLOW-UP VISIT: CPT | Performed by: SURGERY

## 2025-05-11 PROCEDURE — 2500000002 HC RX 250 W HCPCS SELF ADMINISTERED DRUGS (ALT 637 FOR MEDICARE OP, ALT 636 FOR OP/ED)

## 2025-05-11 PROCEDURE — 83735 ASSAY OF MAGNESIUM: CPT

## 2025-05-11 PROCEDURE — 82947 ASSAY GLUCOSE BLOOD QUANT: CPT

## 2025-05-11 PROCEDURE — 2500000001 HC RX 250 WO HCPCS SELF ADMINISTERED DRUGS (ALT 637 FOR MEDICARE OP)

## 2025-05-11 PROCEDURE — 2500000004 HC RX 250 GENERAL PHARMACY W/ HCPCS (ALT 636 FOR OP/ED)

## 2025-05-11 PROCEDURE — 2500000004 HC RX 250 GENERAL PHARMACY W/ HCPCS (ALT 636 FOR OP/ED): Mod: JZ

## 2025-05-11 PROCEDURE — 1200000002 HC GENERAL ROOM WITH TELEMETRY DAILY

## 2025-05-11 PROCEDURE — 85027 COMPLETE CBC AUTOMATED: CPT

## 2025-05-11 RX ORDER — MAGNESIUM SULFATE HEPTAHYDRATE 40 MG/ML
2 INJECTION, SOLUTION INTRAVENOUS ONCE
Status: COMPLETED | OUTPATIENT
Start: 2025-05-11 | End: 2025-05-11

## 2025-05-11 RX ORDER — POTASSIUM CHLORIDE 14.9 MG/ML
20 INJECTION INTRAVENOUS
Status: COMPLETED | OUTPATIENT
Start: 2025-05-11 | End: 2025-05-11

## 2025-05-11 RX ORDER — POTASSIUM CHLORIDE 20 MEQ/1
40 TABLET, EXTENDED RELEASE ORAL ONCE
Status: COMPLETED | OUTPATIENT
Start: 2025-05-11 | End: 2025-05-11

## 2025-05-11 RX ORDER — POTASSIUM CHLORIDE 1.5 G/1.58G
20 POWDER, FOR SOLUTION ORAL 2 TIMES DAILY
Status: DISCONTINUED | OUTPATIENT
Start: 2025-05-11 | End: 2025-05-11

## 2025-05-11 RX ORDER — SODIUM,POTASSIUM PHOSPHATES 280-250MG
1 POWDER IN PACKET (EA) ORAL 4 TIMES DAILY
Status: COMPLETED | OUTPATIENT
Start: 2025-05-11 | End: 2025-05-11

## 2025-05-11 RX ORDER — AMOXICILLIN AND CLAVULANATE POTASSIUM 875; 125 MG/1; MG/1
1 TABLET, FILM COATED ORAL EVERY 12 HOURS SCHEDULED
Status: DISCONTINUED | OUTPATIENT
Start: 2025-05-11 | End: 2025-05-12 | Stop reason: HOSPADM

## 2025-05-11 RX ADMIN — POTASSIUM & SODIUM PHOSPHATES POWDER PACK 280-160-250 MG 1 PACKET: 280-160-250 PACK at 12:54

## 2025-05-11 RX ADMIN — OXYCODONE HYDROCHLORIDE 10 MG: 10 TABLET ORAL at 20:15

## 2025-05-11 RX ADMIN — OXYCODONE HYDROCHLORIDE 10 MG: 10 TABLET ORAL at 13:00

## 2025-05-11 RX ADMIN — INSULIN LISPRO 1 UNITS: 100 INJECTION, SOLUTION INTRAVENOUS; SUBCUTANEOUS at 10:13

## 2025-05-11 RX ADMIN — POTASSIUM & SODIUM PHOSPHATES POWDER PACK 280-160-250 MG 1 PACKET: 280-160-250 PACK at 10:13

## 2025-05-11 RX ADMIN — INSULIN LISPRO 2 UNITS: 100 INJECTION, SOLUTION INTRAVENOUS; SUBCUTANEOUS at 12:54

## 2025-05-11 RX ADMIN — OXYCODONE HYDROCHLORIDE 10 MG: 10 TABLET ORAL at 06:07

## 2025-05-11 RX ADMIN — POTASSIUM CHLORIDE 20 MEQ: 14.9 INJECTION, SOLUTION INTRAVENOUS at 15:58

## 2025-05-11 RX ADMIN — AMOXICILLIN AND CLAVULANATE POTASSIUM 1 TABLET: 875; 125 TABLET, FILM COATED ORAL at 20:14

## 2025-05-11 RX ADMIN — PANTOPRAZOLE SODIUM 40 MG: 40 TABLET, DELAYED RELEASE ORAL at 06:06

## 2025-05-11 RX ADMIN — PIPERACILLIN SODIUM AND TAZOBACTAM SODIUM 3.38 G: 3; .375 INJECTION, SOLUTION INTRAVENOUS at 02:20

## 2025-05-11 RX ADMIN — ACETAMINOPHEN 650 MG: 325 TABLET ORAL at 20:15

## 2025-05-11 RX ADMIN — QUETIAPINE FUMARATE 100 MG: 50 TABLET ORAL at 21:46

## 2025-05-11 RX ADMIN — ACETAMINOPHEN 650 MG: 325 TABLET ORAL at 02:20

## 2025-05-11 RX ADMIN — EXTENDED PHENYTOIN SODIUM 100 MG: 100 CAPSULE, EXTENDED RELEASE ORAL at 08:18

## 2025-05-11 RX ADMIN — ENOXAPARIN SODIUM 40 MG: 100 INJECTION SUBCUTANEOUS at 08:18

## 2025-05-11 RX ADMIN — AMOXICILLIN AND CLAVULANATE POTASSIUM 1 TABLET: 875; 125 TABLET, FILM COATED ORAL at 12:54

## 2025-05-11 RX ADMIN — MAGNESIUM SULFATE HEPTAHYDRATE 2 G: 40 INJECTION, SOLUTION INTRAVENOUS at 10:13

## 2025-05-11 RX ADMIN — INSULIN LISPRO 2 UNITS: 100 INJECTION, SOLUTION INTRAVENOUS; SUBCUTANEOUS at 16:02

## 2025-05-11 RX ADMIN — POTASSIUM CHLORIDE 40 MEQ: 1500 TABLET, EXTENDED RELEASE ORAL at 16:01

## 2025-05-11 RX ADMIN — POTASSIUM CHLORIDE 20 MEQ: 1.5 POWDER, FOR SOLUTION ORAL at 10:13

## 2025-05-11 RX ADMIN — PIPERACILLIN SODIUM AND TAZOBACTAM SODIUM 3.38 G: 3; .375 INJECTION, SOLUTION INTRAVENOUS at 08:18

## 2025-05-11 RX ADMIN — ROSUVASTATIN CALCIUM 40 MG: 40 TABLET, FILM COATED ORAL at 08:18

## 2025-05-11 RX ADMIN — POTASSIUM CHLORIDE 20 MEQ: 14.9 INJECTION, SOLUTION INTRAVENOUS at 18:01

## 2025-05-11 RX ADMIN — ACETAMINOPHEN 650 MG: 325 TABLET ORAL at 08:18

## 2025-05-11 RX ADMIN — DULOXETINE HYDROCHLORIDE 60 MG: 60 CAPSULE, DELAYED RELEASE ORAL at 10:13

## 2025-05-11 RX ADMIN — TAMSULOSIN HYDROCHLORIDE 0.8 MG: 0.4 CAPSULE ORAL at 08:18

## 2025-05-11 ASSESSMENT — PAIN SCALES - GENERAL
PAINLEVEL_OUTOF10: 5 - MODERATE PAIN
PAINLEVEL_OUTOF10: 4
PAINLEVEL_OUTOF10: 7
PAINLEVEL_OUTOF10: 7
PAINLEVEL_OUTOF10: 4
PAINLEVEL_OUTOF10: 7

## 2025-05-11 ASSESSMENT — PAIN - FUNCTIONAL ASSESSMENT
PAIN_FUNCTIONAL_ASSESSMENT: 0-10

## 2025-05-11 ASSESSMENT — PAIN DESCRIPTION - DESCRIPTORS
DESCRIPTORS: ACHING
DESCRIPTORS: ACHING;BURNING;THROBBING

## 2025-05-11 ASSESSMENT — PAIN DESCRIPTION - LOCATION
LOCATION: PERINEUM
LOCATION: SCROTUM

## 2025-05-11 NOTE — PROGRESS NOTES
25 1318   Discharge Planning   Living Arrangements Family members   Support Systems Family members   Assistance Needed none   Number of Stairs to Enter Residence 1   Number of Stairs Within Residence 0   Do you have animals or pets at home? Yes   Who is requesting discharge planning? Provider   Home or Post Acute Services In home services   Type of Home Care Services Home nursing visits;Home PT   Expected Discharge Disposition Home H   Does the patient need discharge transport arranged? Yes   RoundTrip coordination needed? Yes   Patient Choice   Provider Choice list and CMS website (https://medicare.gov/care-compare#search) for post-acute Quality and Resource Measure Data were provided and reviewed with: Other (Comment)  (n/a)   Patient / Family choosing to utilize agency / facility established prior to hospitalization No   Stroke Family Assessment   Stroke Family Assessment Needed No   Intensity of Service   Intensity of Service 0-30 min     [facility] unable to reach Pt by hospital or mobile phone. His niece Thuy was reached by cell phone to complete interview. Thuy states she lives with her uncle and 7 cats. She has previously performed wound care to her uncle years ago with her grandmother's assistance (her grandmother is now ). She states she will be first learner and second will be pt's sister Hector. Pt walks independently without assistive devices. She states he has an electric bed (not hospital, but can be raised and position changed) as well as a shower chair and bars in the bathroom.   We discussed home care. Pt's niece states he had home care briefly in 2017 after returning home from SNF. She is agreeable to [facility] or any agency that can provide services. Thuy states she will not be able to get a ride to the hospital to learn his wound care at bedside prior to discharge.   Thuy confirmed that Dr. Titus is PCP, [facility] is pharmacy of choice. Pt normally drives himself to  appointments but will need transportation arranged at discharge. She believes he could ride in an uber/lyft at discharge and will be able to manage the one step into the house.    Secured message sent to [facility] representative to see if they can provide services to patient's area. Will send blanket referral today once determination made by [facility].    Addendum 1400- [facility] can follow at discharge once orders placed and can provide SOC earliest on Tuesday 5-13    Fabiana Galicia RN  (Weekend Transitional Care Coordinator-[facility], send Epic message)

## 2025-05-11 NOTE — PROGRESS NOTES
[facility]  ACUTE CARE SURGERY - PROGRESS NOTE    Patient Name: Orlin Laguerre  MRN: [id number]  Admit Date: 506  : 1965  AGE: 59 y.o.   GENDER: male  ==============================================================================  TODAY'S ASSESSMENT AND PLAN OF CARE:  Orlin Laguerre is a 59 y.o. male with past medical history significant for diabetes who presented as a transfer from [facility] following perineal NSTI with debridement at OSH. Upon arrival, patient requiring pressor support, taken to TSICU with additional debridement performed . Had final planned debridement on  with wound appearing well.     Needs niece to learn/assist with dressing changes. Working on getting her to come see them be changed 1x in hospital before DC. Needs assistance getting ride home as niece does not drive.    Plan:  Neuro:   -Tylenol, PRN oxycodone   -Continue home duloxetine, phenytoin, Seroquel    CV:  -Monitor vitals  -Continue home rosuvastatin     Resp:  -IS/OOB  -Wean to RA     GI:   -Consistent carb diet   -PPI   -Bowel reg     :   -Continue Flomax     Heme:   -Monitor CBC as clinically indicated     Endo:  -SSI     ID:   -Continue Zosyn (Tentative stop date of )  -Wound appeared well on debridement on ; continue BID WTD dressing changes; no plans for additional operative debridement at this time     Dispo: Niece to get additional training on dressing changes to determine possibility of home with home health vs SNF. Likely here through weekend.     Seen and d/w Dr. Drake Waters MD  PGY-1 General Surgery  Acute Care Surgery [id number]      Subjective   ==============================================================================  CHIEF COMPLAINT / EVENTS LAST 24HRS / HPI:  No acute events overnight. Wound appears unchanged, AM by ACS PM by nursing. No issues per patient. Working on getting niece here before he discharges. Niece did his prior  wounds, knows that this is in sensitive spot and reportedly still interested/able to help. Niece lives with him.     MEDICAL HISTORY / ROS:   Admission history reviewed. Pertinent changes as follows:  None.       Objective   Vital Signs past 24h:  Heart Rate:  [73-93]   Temp:  [36.5 °C (97.7 °F)-36.9 °C (98.4 °F)]   Resp:  [18-20]   BP: (100-115)/(60-72)   SpO2:  [96 %-100 %]     I/O past 24h:  I/O last 2 completed shifts:  In: 150 (1.7 mL/kg) [IV Piggyback:150]  Out: - (0 mL/kg)   Weight: 90 kg      PHYSICAL EXAM:  GEN: No acute distress. Alert, awake and conversive.  HEENT: Sclera anicteric. Moist mucous membranes.  RESP: Breathing non-labored, equal chest rise. On RA.  CV: Regular rate, normotensive  GI: Abdomen soft, nondistended, nontender.  Perennial wound with healthy unchanged appearing tissue, .  No threatened or necrotic appearing tissue.  : Voiding spontaneously.  MSK: No gross deformities. Moves all extremities spontaneously.  NEURO: Alert and oriented x3. No focal deficits.  PSYCH: Appropriate mood and affect.  SKIN: No rashes or lesions.    Labs past 24h:  Results for orders placed or performed during the hospital encounter of 05/06/25 (from the past 24 hours)   POCT GLUCOSE   Result Value Ref Range    POCT Glucose 211 (H) 74 - 99 mg/dL   POCT GLUCOSE   Result Value Ref Range    POCT Glucose 177 (H) 74 - 99 mg/dL   POCT GLUCOSE   Result Value Ref Range    POCT Glucose 209 (H) 74 - 99 mg/dL   POCT GLUCOSE   Result Value Ref Range    POCT Glucose 210 (H) 74 - 99 mg/dL   Magnesium   Result Value Ref Range    Magnesium 1.87 1.60 - 2.40 mg/dL   Renal Function Panel   Result Value Ref Range    Glucose 124 (H) 74 - 99 mg/dL    Sodium 137 136 - 145 mmol/L    Potassium 3.2 (L) 3.5 - 5.3 mmol/L    Chloride 101 98 - 107 mmol/L    Bicarbonate 28 21 - 32 mmol/L    Anion Gap 11 10 - 20 mmol/L    Urea Nitrogen 8 6 - 23 mg/dL    Creatinine 0.67 0.50 - 1.30 mg/dL    eGFR >90 >60 mL/min/1.73m*2    Calcium 7.6 (L) 8.6  - 10.6 mg/dL    Phosphorus 2.6 2.5 - 4.9 mg/dL    Albumin 2.6 (L) 3.4 - 5.0 g/dL   CBC   Result Value Ref Range    WBC 10.0 4.4 - 11.3 x10*3/uL    nRBC 0.0 0.0 - 0.0 /100 WBCs    RBC 3.94 (L) 4.50 - 5.90 x10*6/uL    Hemoglobin 11.8 (L) 13.5 - 17.5 g/dL    Hematocrit 34.4 (L) 41.0 - 52.0 %    MCV 87 80 - 100 fL    MCH 29.9 26.0 - 34.0 pg    MCHC 34.3 32.0 - 36.0 g/dL    RDW 13.1 11.5 - 14.5 %    Platelets 268 150 - 450 x10*3/uL     *Note: Due to a large number of results and/or encounters for the requested time period, some results have not been displayed. A complete set of results can be found in Results Review.       Imaging within past 24h:  Imaging  No results found.      Cardiology, Vascular, and Other Imaging  No other imaging results found for the past 2 days      I have reviewed the imaging above as it pertains to the patient's surgical concerns and agree with the radiologist's interpretation.

## 2025-05-11 NOTE — CARE PLAN
Problem: Pain - Adult  Goal: Verbalizes/displays adequate comfort level or baseline comfort level  Outcome: Progressing     Problem: Safety - Adult  Goal: Free from fall injury  Outcome: Progressing     Problem: Discharge Planning  Goal: Discharge to home or other facility with appropriate resources  Outcome: Progressing     Problem: Chronic Conditions and Co-morbidities  Goal: Patient's chronic conditions and co-morbidity symptoms are monitored and maintained or improved  Outcome: Progressing     Problem: Nutrition  Goal: Nutrient intake appropriate for maintaining nutritional needs  Outcome: Progressing     Problem: Diabetes  Goal: Achieve decreasing blood glucose levels by end of shift  Outcome: Progressing  Goal: Increase stability of blood glucose readings by end of shift  Outcome: Progressing     Problem: Fall/Injury  Goal: Not fall by end of shift  Outcome: Progressing  Goal: Be free from injury by end of the shift  Outcome: Progressing     Problem: Skin  Goal: Promote/optimize nutrition  Outcome: Progressing  Goal: Promote skin healing  Outcome: Progressing   The patient's goals for the shift include discharging planning    The clinical goals for the shift include pt will remain HDS

## 2025-05-11 NOTE — CARE PLAN
Problem: Pain - Adult  Goal: Verbalizes/displays adequate comfort level or baseline comfort level  Outcome: Progressing  Flowsheets (Taken 5/11/2025 0557)  Verbalizes/displays adequate comfort level or baseline comfort level:   Encourage patient to monitor pain and request assistance   Administer analgesics based on type and severity of pain and evaluate response   Consider cultural and social influences on pain and pain management     Problem: Safety - Adult  Goal: Free from fall injury  Outcome: Progressing  Flowsheets (Taken 5/11/2025 0557)  Free from fall injury: Instruct family/caregiver on patient safety     Problem: Fall/Injury  Goal: Not fall by end of shift  Outcome: Progressing     Problem: Skin  Goal: Promote/optimize nutrition  Outcome: Progressing  Flowsheets (Taken 5/11/2025 0557)  Promote/optimize nutrition:   Monitor/record intake including meals   Offer water/supplements/favorite foods  Goal: Promote skin healing  Outcome: Progressing  Flowsheets (Taken 5/11/2025 0557)  Promote skin healing:   Ensure correct size (line/device) and apply per  instructions   Rotate device position/do not position patient on device   The patient's goals for the shift include      The clinical goals for the shift include pain control

## 2025-05-12 ENCOUNTER — HOME HEALTH ADMISSION (OUTPATIENT)
Dept: HOME HEALTH SERVICES | Facility: HOME HEALTH | Age: 60
End: 2025-05-12
Payer: MEDICARE

## 2025-05-12 ENCOUNTER — DOCUMENTATION (OUTPATIENT)
Dept: HOME HEALTH SERVICES | Facility: HOME HEALTH | Age: 60
End: 2025-05-12
Payer: MEDICARE

## 2025-05-12 VITALS
HEIGHT: 71 IN | OXYGEN SATURATION: 96 % | WEIGHT: 198.41 LBS | SYSTOLIC BLOOD PRESSURE: 99 MMHG | HEART RATE: 88 BPM | DIASTOLIC BLOOD PRESSURE: 64 MMHG | RESPIRATION RATE: 16 BRPM | TEMPERATURE: 97.5 F | BODY MASS INDEX: 27.78 KG/M2

## 2025-05-12 LAB
ALBUMIN SERPL BCP-MCNC: 2.7 G/DL (ref 3.4–5)
ANION GAP SERPL CALC-SCNC: 12 MMOL/L (ref 10–20)
BUN SERPL-MCNC: 7 MG/DL (ref 6–23)
CALCIUM SERPL-MCNC: 7.7 MG/DL (ref 8.6–10.6)
CHLORIDE SERPL-SCNC: 105 MMOL/L (ref 98–107)
CO2 SERPL-SCNC: 25 MMOL/L (ref 21–32)
CREAT SERPL-MCNC: 0.49 MG/DL (ref 0.5–1.3)
EGFRCR SERPLBLD CKD-EPI 2021: >90 ML/MIN/1.73M*2
ERYTHROCYTE [DISTWIDTH] IN BLOOD BY AUTOMATED COUNT: 13.3 % (ref 11.5–14.5)
GLUCOSE BLD MANUAL STRIP-MCNC: 156 MG/DL (ref 74–99)
GLUCOSE SERPL-MCNC: 140 MG/DL (ref 74–99)
HCT VFR BLD AUTO: 32.8 % (ref 41–52)
HGB BLD-MCNC: 11.2 G/DL (ref 13.5–17.5)
MAGNESIUM SERPL-MCNC: 2.04 MG/DL (ref 1.6–2.4)
MCH RBC QN AUTO: 29.9 PG (ref 26–34)
MCHC RBC AUTO-ENTMCNC: 34.1 G/DL (ref 32–36)
MCV RBC AUTO: 88 FL (ref 80–100)
NRBC BLD-RTO: 0 /100 WBCS (ref 0–0)
PHOSPHATE SERPL-MCNC: 2.6 MG/DL (ref 2.5–4.9)
PLATELET # BLD AUTO: 275 X10*3/UL (ref 150–450)
POTASSIUM SERPL-SCNC: 4 MMOL/L (ref 3.5–5.3)
RBC # BLD AUTO: 3.74 X10*6/UL (ref 4.5–5.9)
SODIUM SERPL-SCNC: 138 MMOL/L (ref 136–145)
WBC # BLD AUTO: 8.4 X10*3/UL (ref 4.4–11.3)

## 2025-05-12 PROCEDURE — 85027 COMPLETE CBC AUTOMATED: CPT

## 2025-05-12 PROCEDURE — 80069 RENAL FUNCTION PANEL: CPT

## 2025-05-12 PROCEDURE — 82947 ASSAY GLUCOSE BLOOD QUANT: CPT

## 2025-05-12 PROCEDURE — 2500000004 HC RX 250 GENERAL PHARMACY W/ HCPCS (ALT 636 FOR OP/ED)

## 2025-05-12 PROCEDURE — 83735 ASSAY OF MAGNESIUM: CPT

## 2025-05-12 PROCEDURE — 2500000001 HC RX 250 WO HCPCS SELF ADMINISTERED DRUGS (ALT 637 FOR MEDICARE OP)

## 2025-05-12 PROCEDURE — 99239 HOSP IP/OBS DSCHRG MGMT >30: CPT | Performed by: NURSE PRACTITIONER

## 2025-05-12 PROCEDURE — 2500000004 HC RX 250 GENERAL PHARMACY W/ HCPCS (ALT 636 FOR OP/ED): Mod: JZ

## 2025-05-12 PROCEDURE — 2500000002 HC RX 250 W HCPCS SELF ADMINISTERED DRUGS (ALT 637 FOR MEDICARE OP, ALT 636 FOR OP/ED)

## 2025-05-12 RX ORDER — POLYETHYLENE GLYCOL 3350 17 G/17G
17 POWDER, FOR SOLUTION ORAL DAILY
Qty: 30 PACKET | Refills: 0 | Status: SHIPPED | OUTPATIENT
Start: 2025-05-13 | End: 2025-06-12

## 2025-05-12 RX ORDER — AMOXICILLIN AND CLAVULANATE POTASSIUM 875; 125 MG/1; MG/1
1 TABLET, FILM COATED ORAL EVERY 12 HOURS SCHEDULED
Qty: 2 TABLET | Refills: 0 | Status: SHIPPED | OUTPATIENT
Start: 2025-05-12 | End: 2025-05-13

## 2025-05-12 RX ORDER — OXYCODONE HYDROCHLORIDE 5 MG/1
5 TABLET ORAL EVERY 4 HOURS PRN
Qty: 15 TABLET | Refills: 0 | Status: SHIPPED | OUTPATIENT
Start: 2025-05-12 | End: 2025-05-17

## 2025-05-12 RX ADMIN — ACETAMINOPHEN 650 MG: 325 TABLET ORAL at 08:51

## 2025-05-12 RX ADMIN — OXYCODONE HYDROCHLORIDE 10 MG: 10 TABLET ORAL at 06:23

## 2025-05-12 RX ADMIN — POLYETHYLENE GLYCOL 3350 17 G: 17 POWDER, FOR SOLUTION ORAL at 08:51

## 2025-05-12 RX ADMIN — AMOXICILLIN AND CLAVULANATE POTASSIUM 1 TABLET: 875; 125 TABLET, FILM COATED ORAL at 08:51

## 2025-05-12 RX ADMIN — DULOXETINE HYDROCHLORIDE 60 MG: 60 CAPSULE, DELAYED RELEASE ORAL at 08:51

## 2025-05-12 RX ADMIN — ACETAMINOPHEN 650 MG: 325 TABLET ORAL at 02:02

## 2025-05-12 RX ADMIN — EXTENDED PHENYTOIN SODIUM 100 MG: 100 CAPSULE, EXTENDED RELEASE ORAL at 09:03

## 2025-05-12 RX ADMIN — ENOXAPARIN SODIUM 40 MG: 100 INJECTION SUBCUTANEOUS at 08:51

## 2025-05-12 RX ADMIN — INSULIN LISPRO 1 UNITS: 100 INJECTION, SOLUTION INTRAVENOUS; SUBCUTANEOUS at 08:51

## 2025-05-12 RX ADMIN — PANTOPRAZOLE SODIUM 40 MG: 40 TABLET, DELAYED RELEASE ORAL at 06:23

## 2025-05-12 RX ADMIN — TAMSULOSIN HYDROCHLORIDE 0.8 MG: 0.4 CAPSULE ORAL at 08:51

## 2025-05-12 RX ADMIN — ROSUVASTATIN CALCIUM 40 MG: 40 TABLET, FILM COATED ORAL at 08:51

## 2025-05-12 ASSESSMENT — PAIN DESCRIPTION - LOCATION: LOCATION: PERINEUM

## 2025-05-12 ASSESSMENT — COGNITIVE AND FUNCTIONAL STATUS - GENERAL
MOBILITY SCORE: 22
DAILY ACTIVITIY SCORE: 24
WALKING IN HOSPITAL ROOM: A LITTLE
CLIMB 3 TO 5 STEPS WITH RAILING: A LITTLE

## 2025-05-12 ASSESSMENT — PAIN - FUNCTIONAL ASSESSMENT
PAIN_FUNCTIONAL_ASSESSMENT: 0-10
PAIN_FUNCTIONAL_ASSESSMENT: 0-10

## 2025-05-12 ASSESSMENT — PAIN DESCRIPTION - DESCRIPTORS
DESCRIPTORS: DISCOMFORT
DESCRIPTORS: DISCOMFORT

## 2025-05-12 ASSESSMENT — PAIN SCALES - GENERAL
PAINLEVEL_OUTOF10: 7
PAINLEVEL_OUTOF10: 7

## 2025-05-12 NOTE — CARE PLAN
The patient's goals for the shift include      The clinical goals for the shift include pt will remain HDS throughout shift.

## 2025-05-12 NOTE — CARE PLAN
Problem: Pain - Adult  Goal: Verbalizes/displays adequate comfort level or baseline comfort level  Outcome: Progressing  Flowsheets (Taken 5/11/2025 0557)  Verbalizes/displays adequate comfort level or baseline comfort level:   Encourage patient to monitor pain and request assistance   Administer analgesics based on type and severity of pain and evaluate response   Consider cultural and social influences on pain and pain management     Problem: Safety - Adult  Goal: Free from fall injury  Outcome: Progressing  Flowsheets (Taken 5/11/2025 0557)  Free from fall injury: Instruct family/caregiver on patient safety     Problem: Skin  Goal: Promote/optimize nutrition  Outcome: Progressing  Flowsheets (Taken 5/12/2025 0510)  Promote/optimize nutrition:   Monitor/record intake including meals   Offer water/supplements/favorite foods  Goal: Promote skin healing  Outcome: Progressing  Flowsheets (Taken 5/12/2025 0510)  Promote skin healing: Turn/reposition every 2 hours/use positioning/transfer devices   The patient's goals for the shift include      The clinical goals for the shift include pt will remain HDS throughout shift.

## 2025-05-12 NOTE — NURSING NOTE
Patient has been medically discharged home, means of transportation is [employer].  All wound care items obtained by patient, all IV's have been removed, and all personal belongings have been taken with patient. Patient will follow up with outside provider.     Lizzette Alonzo

## 2025-05-12 NOTE — DISCHARGE SUMMARY
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 male with past medical history significant for diabetes who presented as a transfer from [facility] following perineal NSTI with debridement at OSH. Upon arrival, patient requiring pressor support, taken to TSICU with additional debridement performed 5/7. Had final planned debridement on 5/9 with wound appearing well. His wound was inspected under anesthesia and washed out, and subsequently packed with WTD kerlix. He then returned to the [facility] with no furhter plans for OR this admission.   Per patient, he has robust family support to aid in wound care and recommendations were home with home health care with no further needs for PT/OT.   He was discharged home on 5/12 with home with home health care, and follow up appointments arranged. At the time of discharge his pain was well controlled with oral pain medications, he was voiding spontaneously, and having bowel movements. He is being discharged home on PO Augmentin for 1 more day to complete antibiotic course.     Total time spent on discharge planning and education approximately thirty minutes.     Pertinent Physical Exam At Time of Discharge  Physical Exam  Constitutional:       Appearance: Normal appearance.   Eyes:      Extraocular Movements: Extraocular movements intact.   Cardiovascular:      Rate and Rhythm: Normal rate.   Pulmonary:      Effort: Pulmonary effort is normal.   Abdominal:      General: There is no distension.      Palpations: Abdomen is soft.   Musculoskeletal:         General: Normal range of motion.   Skin:     Comments: Perineal wound: healthy appearing granulation tissue, no necrotic tissue noted, packed with NS wet to moist kerlix wound and covered with dry clean dressing    Neurological:      Mental Status: He is alert and oriented to person, place, and time.   Psychiatric:         Mood and Affect: Mood normal.         Behavior: Behavior normal.         Home Medications     Medication List      START taking these medications     amoxicillin-clavulanate 875-125 mg tablet; Commonly known as: Augmentin;   Take 1 tablet by mouth every 12 hours for 1 day.   oxyCODONE 5 mg immediate release tablet; Commonly known as: Roxicodone;   Take 1 tablet (5 mg) by mouth every 4 hours if needed for moderate pain (4   - 6) for up to 5 days.   polyethylene glycol 17 gram packet; Commonly known as: Glycolax,   Miralax; Take 17 g by mouth once daily.; Start taking on: May 13, 2025     CHANGE how you take these medications     Lantus Solostar U-100 Insulin 100 unit/mL (3 mL) pen; Generic drug:   insulin glargine; INJECT 90 UNITS UNDER THE SKIN ONCE DAILY AT BEDTIME.;   What changed: See the new instructions.     CONTINUE taking these medications     albuterol 90 mcg/actuation inhaler; INHALE 2 PUFFS EVERY 4 HOURS AS   NEEDED   aspirin 81 mg EC tablet   atenoloL-chlorthalidone 50-25 mg tablet; Commonly known as: Tenoretic   50; Take 1 tablet by mouth once daily.   DULoxetine 60 mg DR capsule; Commonly known as: Cymbalta; Take 1 capsule   (60 mg) by mouth once daily. For 30 days    mg tablet; Generic drug: ibuprofen; TAKE 1 TABLET THREE TIMES   DAILY AS NEEDED. TAKE WITH FOOD OR MILK.   icosapent ethyL 1 gram capsule; Commonly known as: Vascepa; Take 2   capsules (2 g) by mouth 2 times daily (morning and late afternoon).   insulin aspart 100 unit/mL (3 mL) pen; Commonly known as: NovoLOG   Flexpen U-100 Insulin; INJECT UP TO 50 UNITS UNDER THE SKIN DAILY AS   DIRECTED. DISCARD PEN 28 DAYS AFTER OPENING   Jardiance 25 mg tablet; Generic drug: empagliflozin; TAKE 1 TABLET EVERY   DAY   losartan 25 mg tablet; Commonly known as: Cozaar; Take 1 tablet (25 mg)   by mouth once daily.   methocarbamol 750 mg  "tablet; Commonly known as: Robaxin; TAKE 1 TABLET   EVERY 4 HOURS AS NEEDED   Mounjaro 7.5 mg/0.5 mL pen injector; Generic drug: tirzepatide; Inject   7.5 mg under the skin 1 (one) time per week.   nitroglycerin 0.4 mg SL tablet; Commonly known as: Nitrostat; Place 1   tablet (0.4 mg) under the tongue every 5 minutes if needed for chest pain.   For 90 days   omeprazole 40 mg DR capsule; Commonly known as: PriLOSEC; Take 1 capsule   (40 mg) by mouth once daily.   pen needle 1/2\" 29G X 12mm needle; Commonly known as: BD Ultra-Fine Orig   Pen Needle; Use as instructed   phenytoin  mg capsule; Commonly known as: Dilantin; TAKE 1 CAPSULE   EVERY DAY   QUEtiapine 100 mg tablet; Commonly known as: SEROquel; Take 1 tablet   (100 mg) by mouth once daily at bedtime. For 30 days   rosuvastatin 40 mg tablet; Commonly known as: Crestor; Take 1 tablet (40   mg) by mouth once daily.   SUMAtriptan 50 mg tablet; Commonly known as: Imitrex   tamsulosin 0.4 mg 24 hr capsule; Commonly known as: Flomax; Take 2   capsules (0.8 mg) by mouth once daily.     STOP taking these medications     naproxen 500 mg tablet; Commonly known as: Naprosyn       Outpatient Follow-Up  Future Appointments   Date Time Provider Department [place]   5/27/2025 12:30 PM [facility] [id number] [facility] [id number] [facility]   6/12/2025 10:45 AM Mika Cavanaugh MD [id number] [facility]   7/14/2025  2:15 PM Melba GusmanD [id number] [facility]   8/18/2025  2:00 PM Frank Quintana MD [id number] [facility]   9/12/2025  3:40 PM Galindo Titus DO [id number] [facility]       Darci Collado, APRN-CNP  "

## 2025-05-12 NOTE — HOSPITAL COURSE
Orlin Laguerre is a 59 y.o. male with past medical history significant for diabetes who presented as a transfer from [facility] following perineal NSTI with debridement at OSH. Upon arrival, patient requiring pressor support, taken to TSICU with additional debridement performed 5/7. Had final planned debridement on 5/9 with wound appearing well. His wound was inspected under anesthesia and washed out, and subsequently packed with WTD kerlix. He then returned to the [facility] with no furhter plans for OR this admission.   Per patient, he has robust family support to aid in wound care and recommendations were home with home health care with no further needs for PT/OT.   He was discharged home on 5/12 with home with home health care, and follow up appointments arranged. At the time of discharge his pain was well controlled with oral pain medications, he was voiding spontaneously, and having bowel movements. He is being discharged home on PO Augmentin for 1 more day to complete antibiotic course.

## 2025-05-12 NOTE — HH CARE COORDINATION
Home Care received a Referral for Nursing. We have processed the referral for a Start of Care on 5/13-5/14/25 .     If you have any questions or concerns, please feel free to contact us at [phone]. Follow the prompts, enter your five digit zip code, and you will be directed to your care team on EAST 1.

## 2025-05-13 ENCOUNTER — PATIENT OUTREACH (OUTPATIENT)
Dept: PRIMARY CARE | Facility: CLINIC | Age: 60
End: 2025-05-13
Payer: MEDICARE

## 2025-05-13 LAB
LABORATORY COMMENT REPORT: NORMAL
PATH REPORT.FINAL DX SPEC: NORMAL
PATH REPORT.GROSS SPEC: NORMAL
PATH REPORT.RELEVANT HX SPEC: NORMAL
PATH REPORT.TOTAL CANCER: NORMAL

## 2025-05-13 NOTE — PROGRESS NOTES
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 he states that he is doing okay at home. He reports his pain at 5/10 on the pain scale, stating that he just took his pain medication. He has all discharge medication at the home. PCP follow up appointment has been tasked to the office due to availability. He has been given this CM's contact information and is encouraged to call with any questions. he was very thankful for this call. (5/13/2025 10:43 AM)    Medications  Medications reviewed with patient/caregiver?: Yes (5/13/2025 10:43 AM)  Is the patient having any side effects they believe may be caused by any medication additions or changes?: No (5/13/2025 10:43 AM)  Does the patient have all medications ordered at discharge?: Yes (5/13/2025 10:43 AM)  Prescription Comments: Scripts given at discharge for Augmentin, Oxycodone and Miralax (5/13/2025 10:43 AM)  Is the patient taking all medications as directed (includes completed medication regime)?: Yes (5/13/2025 10:43 AM)  Medication Comments: patient denies any issues obtaining or affording medication (5/13/2025 10:43 AM)    Appointments  Does the patient have a primary care provider?: Yes (5/13/2025 10:43 AM)  Care Management Interventions: -- (Tasked to office due to availability) (5/13/2025 10:43 AM)  Has the patient kept scheduled appointments due by today?: Yes (5/13/2025 10:43 AM)    Self Management  What is the home health agency?: [facility] (5/13/2025 10:43 AM)  Has home health visited  the patient within 72 hours of discharge?: Call prior to 72 hours (5/13/2025 10:43 AM)  What Durable Medical Equipment (DME) was ordered?: N/A (5/13/2025 10:43 AM)    Patient Teaching  Does the patient have access to their discharge instructions?: Yes (5/13/2025 10:43 AM)  Care Management Interventions: Reviewed instructions with patient (5/13/2025 10:43 AM)  What is the patient's perception of their health status since discharge?: Improving (5/13/2025 10:43 AM)  Is the patient/caregiver able to teach back the hierarchy of who to call/visit for symptoms/problems? PCP, Specialist, Home Health nurse, Urgent Care, ED, 911: Yes (5/13/2025 10:43 AM)

## 2025-05-14 ENCOUNTER — HOME CARE VISIT (OUTPATIENT)
Dept: HOME HEALTH SERVICES | Facility: HOME HEALTH | Age: 60
End: 2025-05-14
Payer: MEDICARE

## 2025-05-14 VITALS
SYSTOLIC BLOOD PRESSURE: 102 MMHG | RESPIRATION RATE: 18 BRPM | HEART RATE: 60 BPM | OXYGEN SATURATION: 95 % | WEIGHT: 197 LBS | DIASTOLIC BLOOD PRESSURE: 58 MMHG | HEIGHT: 71 IN | TEMPERATURE: 98.3 F | BODY MASS INDEX: 27.58 KG/M2

## 2025-05-14 LAB
ABO GROUP (TYPE) IN BLOOD: NORMAL
ANTIBODY SCREEN: NORMAL
RH FACTOR (ANTIGEN D): NORMAL

## 2025-05-14 PROCEDURE — G0299 HHS/HOSPICE OF RN EA 15 MIN: HCPCS | Mod: HHH

## 2025-05-14 PROCEDURE — 169592 NO-PAY CLAIM PROCEDURE

## 2025-05-16 ENCOUNTER — OFFICE VISIT (OUTPATIENT)
Dept: WOUND CARE | Facility: HOSPITAL | Age: 60
End: 2025-05-16
Payer: MEDICARE

## 2025-05-16 PROCEDURE — 99214 OFFICE O/P EST MOD 30 MIN: CPT

## 2025-05-17 ENCOUNTER — HOME CARE VISIT (OUTPATIENT)
Dept: HOME HEALTH SERVICES | Facility: HOME HEALTH | Age: 60
End: 2025-05-17
Payer: MEDICARE

## 2025-05-17 ASSESSMENT — ENCOUNTER SYMPTOMS
PAIN: 1
CHANGE IN APPETITE: UNCHANGED
PERSON REPORTING PAIN: PATIENT
APPETITE LEVEL: FAIR
PAIN LOCATION: PERINEUM
FATIGUES EASILY: 1
PAIN LOCATION - PAIN SEVERITY: 3/10
MUSCLE WEAKNESS: 1

## 2025-05-17 ASSESSMENT — ACTIVITIES OF DAILY LIVING (ADL)
OASIS_M1830: 05
ENTERING_EXITING_HOME: ONE PERSON

## 2025-05-17 NOTE — CASE COMMUNICATION
"Called HC office to request change of nurse. Stated that Nurse advised him to go to the [facility], which he did pursue, but \"wasted 2 hours of time, when I could have been sitting down or sleeping.\"  States his dressings were changed at the Clinic, and that he changes his dressings himself, BID, but needs a nurse to come only to monitor the progress of the wound.  Visits scheduled coming week with a different clinician. PSC noti fied to flag chart."

## 2025-05-19 ENCOUNTER — APPOINTMENT (OUTPATIENT)
Dept: HOME HEALTH SERVICES | Facility: HOME HEALTH | Age: 60
End: 2025-05-19
Payer: MEDICARE

## 2025-05-19 ENCOUNTER — HOSPITAL ENCOUNTER (OUTPATIENT)
Dept: RADIOLOGY | Facility: CLINIC | Age: 60
Discharge: HOME | End: 2025-05-19
Payer: MEDICARE

## 2025-05-19 ENCOUNTER — APPOINTMENT (OUTPATIENT)
Dept: ORTHOPEDIC SURGERY | Facility: CLINIC | Age: 60
End: 2025-05-19
Payer: MEDICARE

## 2025-05-19 DIAGNOSIS — M75.121 COMPLETE TEAR OF RIGHT ROTATOR CUFF, UNSPECIFIED WHETHER TRAUMATIC: Primary | ICD-10-CM

## 2025-05-19 DIAGNOSIS — M25.511 ACUTE PAIN OF RIGHT SHOULDER: ICD-10-CM

## 2025-05-19 PROCEDURE — 73030 X-RAY EXAM OF SHOULDER: CPT | Mod: RIGHT SIDE | Performed by: RADIOLOGY

## 2025-05-19 PROCEDURE — 4010F ACE/ARB THERAPY RXD/TAKEN: CPT | Performed by: ORTHOPAEDIC SURGERY

## 2025-05-19 PROCEDURE — 3048F LDL-C <100 MG/DL: CPT | Performed by: ORTHOPAEDIC SURGERY

## 2025-05-19 PROCEDURE — 3062F POS MACROALBUMINURIA REV: CPT | Performed by: ORTHOPAEDIC SURGERY

## 2025-05-19 PROCEDURE — 3044F HG A1C LEVEL LT 7.0%: CPT | Performed by: ORTHOPAEDIC SURGERY

## 2025-05-19 PROCEDURE — 73030 X-RAY EXAM OF SHOULDER: CPT | Mod: RT

## 2025-05-19 PROCEDURE — 99213 OFFICE O/P EST LOW 20 MIN: CPT | Performed by: ORTHOPAEDIC SURGERY

## 2025-05-19 ASSESSMENT — PAIN SCALES - GENERAL: PAINLEVEL_OUTOF10: 7

## 2025-05-19 ASSESSMENT — PAIN - FUNCTIONAL ASSESSMENT: PAIN_FUNCTIONAL_ASSESSMENT: 0-10

## 2025-05-19 NOTE — PROGRESS NOTES
[facility]  TRAUMA CLINIC PROGRESS NOTE    Patient Name: Orlin Laguerre  MRN: [id number]  Admit Date:   : 1965  AGE: 59 y.o.   GENDER: male  ==============================================================================  CHIEF COMPLAINT:   Samm gangrene (2025 - 2025)    OTHER MEDICAL PROBLEMS:  DMII    INCIDENTAL FINDINGS:  NA    PROCEDURES:  2025: DEBRIDEMENT, PERINEAL WOUND   2025: Exam under anesthesia, debridement of perineal wound.     PATHOLOGY:  NA  ==============================================================================  TODAY'S ASSESSMENT AND PLAN OF CARE:  WOUND CARE - Perineum   - Take daily showers  - Allow warm, soapy water to wash over wound  - Do not scrub at the wound  - When out of the shower, gently pat the wound dry.  - Do not apply lotions, ointments or creams  - Avoid soaking in bodies of water (bathtub, hot tubs, pools, lakes, etc) until wound is completely healed    PACKING THE WOUND  - after your shower take Kerlix, moisten with Sterile saline and insert it into the opening.    * the Kerlix should be moist, not soaking wet, please make sure to wring it out.  - cover the packed area with a 4x4 and paper tape.     FOLLOW UP/CALL  - 2025 trauma/ACS follow up for wound check  - May return to work or school on TBD at later appointment  - Return to clinic or ER sooner if pt. has any development of erythema, drainage, swelling, pain, fevers, or chills  - If you have questions or concerns that are not urgent, please feel free to call  [phone].  - Call [phone] to make additional appointment(s) as needed if unable to reschedule in office today    ==============================================================================  HISTORY OF PRESENT ILLNESS  Mr. Laguerre is a 58 y/o M following up after hospitalization from  -  s/p NSTI to [facility]. Patient underwent his initial operation at [facility] and then was  subsequently transferred to [facility] for on going procedures. Patient arrives today for post operative/ wound check.   He states that he has been changing the packing in the wound twice a day and that home health care is helping him with this. He also states that he has not showered since before the hospital.   Patient is eating, drinking, voiding and having flatus, bowel movements.   MEDICAL HISTORY / ROS:  Admission history and ROS reviewed.   Patient denies:  fevers; chills; headache;  dizziness; chest pain; shortness of breath; nausea/vomiting/diarrhea/constipation; new/worsening abdominal pain or numbness/tingling/weakness of extremities.   Pertinent changes as follows:  NA    PHYSICAL EXAM:  GCS 15, A+OX3, RRR, S1, S2, CTA=, no increased WOB. Abd soft, nt, nd. MAEx4, ROSA 5/5 x4, no extremity edema noted. 2+pp.   Wound location: perineum  Wound length: 12 cm  Wound width: 3.3 cm  Wound depth: 3.4 cm  Wound description: healthy beefy red tissue noted    LABS:  No results found. However, due to the size of the patient record, not all encounters were searched. Please check Results Review for a complete set of results.  MEDICATIONS:  Current Medications[1]    IMAGING SUMMARY:  (summary of new imaging findings, not a copy of dictation)  NA    I have reviewed all laboratory and imaging results ordered/pertinent for today's encounter.          [1]   Current Outpatient Medications   Medication Sig Dispense Refill    albuterol 90 mcg/actuation inhaler INHALE 2 PUFFS EVERY 4 HOURS AS NEEDED 18 g 3    aspirin 81 mg EC tablet Take 1 tablet (81 mg) by mouth once daily. For 30 days      atenoloL-chlorthalidone (Tenoretic 50) 50-25 mg tablet Take 1 tablet by mouth once daily. 90 tablet 1    DULoxetine (Cymbalta) 60 mg DR capsule Take 1 capsule (60 mg) by mouth once daily. For 30 days 90 capsule 1     mg tablet TAKE 1 TABLET THREE TIMES DAILY AS NEEDED. TAKE WITH FOOD OR MILK. 270 tablet 3    icosapent ethyL (Vascepa) 1  "gram capsule Take 2 capsules (2 g) by mouth 2 times daily (morning and late afternoon). 360 capsule 1    insulin aspart (NovoLOG Flexpen U-100 Insulin) 100 unit/mL (3 mL) pen INJECT UP TO 50 UNITS UNDER THE SKIN DAILY AS DIRECTED. DISCARD PEN 28 DAYS AFTER OPENING 45 mL 3    Jardiance 25 mg TAKE 1 TABLET EVERY DAY 90 tablet 3    Lantus Solostar U-100 Insulin 100 unit/mL (3 mL) pen INJECT 90 UNITS UNDER THE SKIN ONCE DAILY AT BEDTIME. (Patient taking differently: Inject 80 Units under the skin once daily at bedtime.) 90 mL 3    losartan (Cozaar) 25 mg tablet Take 1 tablet (25 mg) by mouth once daily. 90 tablet 1    methocarbamol (Robaxin) 750 mg tablet TAKE 1 TABLET EVERY 4 HOURS AS NEEDED 270 tablet 3    nitroglycerin (Nitrostat) 0.4 mg SL tablet Place 1 tablet (0.4 mg) under the tongue every 5 minutes if needed for chest pain. For 90 days 25 tablet 3    omeprazole (PriLOSEC) 40 mg DR capsule Take 1 capsule (40 mg) by mouth once daily. 90 capsule 1    pen needle 1/2\" (BD Ultra-Fine Orig Pen Needle) 29G X 12mm needle Use as instructed 100 each 3    phenytoin ER (Dilantin) 100 mg capsule TAKE 1 CAPSULE EVERY DAY 90 capsule 3    polyethylene glycol (Glycolax, Miralax) 17 gram packet Take 17 g by mouth once daily. 30 packet 0    QUEtiapine (SEROquel) 100 mg tablet Take 1 tablet (100 mg) by mouth once daily at bedtime. For 30 days 90 tablet 1    rosuvastatin (Crestor) 40 mg tablet Take 1 tablet (40 mg) by mouth once daily. 90 tablet 1    SUMAtriptan (Imitrex) 50 mg tablet Take 1 tablet (50 mg) by mouth once daily as needed. At least 2 hours between doses for 60 days      tamsulosin (Flomax) 0.4 mg 24 hr capsule Take 2 capsules (0.8 mg) by mouth once daily. 90 capsule 1    tirzepatide (Mounjaro) 7.5 mg/0.5 mL pen injector Inject 7.5 mg under the skin 1 (one) time per week. 6 mL 1     No current facility-administered medications for this visit.     "

## 2025-05-20 ENCOUNTER — OFFICE VISIT (OUTPATIENT)
Dept: PRIMARY CARE | Facility: CLINIC | Age: 60
End: 2025-05-20
Payer: MEDICARE

## 2025-05-20 VITALS
SYSTOLIC BLOOD PRESSURE: 122 MMHG | DIASTOLIC BLOOD PRESSURE: 72 MMHG | HEART RATE: 86 BPM | OXYGEN SATURATION: 97 % | BODY MASS INDEX: 26.64 KG/M2 | WEIGHT: 191 LBS

## 2025-05-20 DIAGNOSIS — Z79.4 TYPE 2 DIABETES MELLITUS WITH HYPERGLYCEMIA, WITH LONG-TERM CURRENT USE OF INSULIN: ICD-10-CM

## 2025-05-20 DIAGNOSIS — S09.92XA INJURY OF NOSE, INITIAL ENCOUNTER: ICD-10-CM

## 2025-05-20 DIAGNOSIS — E11.65 TYPE 2 DIABETES MELLITUS WITH HYPERGLYCEMIA, WITH LONG-TERM CURRENT USE OF INSULIN: ICD-10-CM

## 2025-05-20 DIAGNOSIS — F51.01 PRIMARY INSOMNIA: ICD-10-CM

## 2025-05-20 DIAGNOSIS — N49.3 FOURNIER GANGRENE IN MALE: Primary | ICD-10-CM

## 2025-05-20 PROCEDURE — 99495 TRANSJ CARE MGMT MOD F2F 14D: CPT | Performed by: FAMILY MEDICINE

## 2025-05-20 PROCEDURE — 3062F POS MACROALBUMINURIA REV: CPT | Performed by: FAMILY MEDICINE

## 2025-05-20 PROCEDURE — 3078F DIAST BP <80 MM HG: CPT | Performed by: FAMILY MEDICINE

## 2025-05-20 PROCEDURE — 3074F SYST BP LT 130 MM HG: CPT | Performed by: FAMILY MEDICINE

## 2025-05-20 PROCEDURE — 3048F LDL-C <100 MG/DL: CPT | Performed by: FAMILY MEDICINE

## 2025-05-20 PROCEDURE — 3044F HG A1C LEVEL LT 7.0%: CPT | Performed by: FAMILY MEDICINE

## 2025-05-20 PROCEDURE — 4010F ACE/ARB THERAPY RXD/TAKEN: CPT | Performed by: FAMILY MEDICINE

## 2025-05-20 RX ORDER — TIRZEPATIDE 10 MG/.5ML
10 INJECTION, SOLUTION SUBCUTANEOUS WEEKLY
Qty: 6 ML | Refills: 0 | Status: SHIPPED | OUTPATIENT
Start: 2025-05-20 | End: 2026-05-20

## 2025-05-20 RX ORDER — TRAZODONE HYDROCHLORIDE 100 MG/1
100 TABLET ORAL NIGHTLY PRN
Qty: 30 TABLET | Refills: 0 | Status: SHIPPED | OUTPATIENT
Start: 2025-05-20 | End: 2026-05-20

## 2025-05-20 ASSESSMENT — ENCOUNTER SYMPTOMS
FATIGUE: 0
SINUS PAIN: 0
WHEEZING: 0
JOINT SWELLING: 0
WOUND: 0
SHORTNESS OF BREATH: 0
LOSS OF SENSATION IN FEET: 0
FEVER: 0
CHILLS: 0
DEPRESSION: 0
OCCASIONAL FEELINGS OF UNSTEADINESS: 1
ARTHRALGIAS: 1

## 2025-05-20 ASSESSMENT — PAIN SCALES - GENERAL: PAINLEVEL_OUTOF10: 8

## 2025-05-20 ASSESSMENT — COLUMBIA-SUICIDE SEVERITY RATING SCALE - C-SSRS
1. IN THE PAST MONTH, HAVE YOU WISHED YOU WERE DEAD OR WISHED YOU COULD GO TO SLEEP AND NOT WAKE UP?: NO
6. HAVE YOU EVER DONE ANYTHING, STARTED TO DO ANYTHING, OR PREPARED TO DO ANYTHING TO END YOUR LIFE?: NO
2. HAVE YOU ACTUALLY HAD ANY THOUGHTS OF KILLING YOURSELF?: NO

## 2025-05-20 NOTE — PROGRESS NOTES
"Patient: Orlin Laguerre \"Michael\"  : 1965  PCP: Galindo Titus DO  MRN: [id number]  Program: Transitional Care Management  Status: Enrolled  Effective Dates: 2025 - present  Responsible Staff: Melinda Chao  Social Drivers to be Addressed: No information to display         Orlin Laguerre \"Michael\" is a 59 y.o. male presenting today for follow-up after being discharged from the hospital 8 days ago. The main problem requiring admission was Gangrene. The discharge summary and/or Transitional Care Management documentation was reviewed. Medication reconciliation was performed as indicated via the \"Christiano as Reviewed\" timestamp.     Orlin Laguerre \"Michael\" was contacted by Transitional Care Management services two days after his discharge. This encounter and supporting documentation was reviewed.    \"Hospital Course  Orlin Laguerre is a 59 y.o. male with past medical history significant for diabetes who presented as a transfer from [facility] following perineal NSTI with debridement at OSH. Upon arrival, patient requiring pressor support, taken to TSICU with additional debridement performed . Had final planned debridement on  with wound appearing well. His wound was inspected under anesthesia and washed out, and subsequently packed with WTD kerlix. He then returned to the [facility] with no furhter plans for OR this admission.   Per patient, he has robust family support to aid in wound care and recommendations were home with home health care with no further needs for PT/OT.   He was discharged home on  with home with home health care, and follow up appointments arranged. At the time of discharge his pain was well controlled with oral pain medications, he was voiding spontaneously, and having bowel movements. He is being discharged home on PO Augmentin for 1 more day to complete antibiotic course. \"      Since discharge patient completed antibiotic course.  Has been doing dressing changes at home and is " having a wound care nurse come to his home regularly.  Has follow-up with his surgeon later in this month.  He is not provided wound care supplies for himself.  Pain is stable not acutely worsening.    Currently on Jardiance.    States that when he fell he hit his nose.  Concerned he may have broke it.  Denies any bruising or deformity.    Insomnia is worsening presently.      Denies fevers chills worsening or progressively worsening symptoms.    All pertinent positive symptoms are included in history of present illness.    All other systems have been reviewed and are negative and noncontributory to this patient's current ailments.    /72   Pulse 86   Wt 86.6 kg (191 lb)   SpO2 97%   BMI 26.64 kg/m²     CONSTITUTIONAL - INAD. Not ill appearing.  SKIN - No lesions or rashes visualized. No jaundice visualized.  HEENT- Atraumatic, normocephalic, no scleral icterus, external nares are not erythematous and without drainage, no neck masses visualized, oropharynx visualized and is without erythema or exudate  RESP - respiration not labored   CARDIAC - no grade 6 systolic murmurs auscultated  ABDOMEN - nondistended.  NEURO- CNs II-XII grossly intact        The complexity of medical decision making for this patient's transitional care is moderate.    1. 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    2. Type 2 diabetes mellitus with hyperglycemia, with long-term current use of insulin  Recommend discontinue Jardiance as a known side effect of this medication is Samm's gangrene.  Will increase Mounjaro to compensate.  Recommend follow-up with cardiology  - tirzepatide (Mounjaro) 10 mg/0.5 mL pen injector; Inject 10 mg under the skin 1 (one) time per week.  Dispense: 6 mL; Refill: 0    3. Primary insomnia  Spoke to the patient extensively about the natural history and course of insomnia.  Spoke about sleep onset disorders and sleep latency disorders.     Spoke about how a combination of sleep hygiene, circadian reset, and treating the underlying condition will likely lead to resolution and better sleep.    Medications used to treat insomnia with possibilities including melatonin, trazodone, doxepin, hypnotics such as Ambien or Lunesta, and others.    Advised the patient to start taking melatonin at the same time every night to help reset hormone signaling.     Good sleep habits (sometimes referred to as ``sleep hygiene´´) can help you get a good night´s sleep.      Some habits that can improve your sleep health:  -Be consistent. Go to bed at the same time each night and get up at the same time each morning, including on the weekends.  -Make sure your bedroom is quiet, dark, relaxing, and at a comfortable temperature.  -Remove electronic devices, such as TVs, computers, and smart phones, from the bedroom.  Avoid blue lights after sundown and use a bluelight filter for your phone and television sets.  -Avoid large meals, caffeine, and alcohol before bedtime.  -Get some exercise. Being physically active during the day can help you fall asleep more easily at night.  There are very few  recruits with insomnia.  High intensity exercise to exhaustion will allow you to sleep and overall improve your health.  -Melatonin taken at the same time every night can help reset your circadian rhythm and homone signals that it's time to sleep.  - traZODone (Desyrel) 100 mg tablet; Take 1 tablet (100 mg) by mouth as needed at bedtime for sleep.  Dispense: 30 tablet; Refill: 0    4. Injury of nose, initial encounter  See ENT  - Referral to ENT; Future
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
## 2025-05-21 ENCOUNTER — APPOINTMENT (OUTPATIENT)
Dept: HOME HEALTH SERVICES | Facility: HOME HEALTH | Age: 60
End: 2025-05-21
Payer: MEDICARE

## 2025-05-21 VITALS
SYSTOLIC BLOOD PRESSURE: 122 MMHG | TEMPERATURE: 98 F | RESPIRATION RATE: 16 BRPM | DIASTOLIC BLOOD PRESSURE: 64 MMHG | HEART RATE: 86 BPM | OXYGEN SATURATION: 98 %

## 2025-05-21 PROCEDURE — G0300 HHS/HOSPICE OF LPN EA 15 MIN: HCPCS | Mod: HHH

## 2025-05-21 ASSESSMENT — ENCOUNTER SYMPTOMS
DENIES PAIN: 1
CHANGE IN APPETITE: UNCHANGED
LAST BOWEL MOVEMENT: 67346
APPETITE LEVEL: GOOD

## 2025-05-23 ENCOUNTER — HOME CARE VISIT (OUTPATIENT)
Dept: HOME HEALTH SERVICES | Facility: HOME HEALTH | Age: 60
End: 2025-05-23
Payer: MEDICARE

## 2025-05-23 VITALS
OXYGEN SATURATION: 100 % | HEART RATE: 89 BPM | RESPIRATION RATE: 16 BRPM | TEMPERATURE: 97.9 F | SYSTOLIC BLOOD PRESSURE: 98 MMHG | DIASTOLIC BLOOD PRESSURE: 58 MMHG

## 2025-05-23 PROCEDURE — G0300 HHS/HOSPICE OF LPN EA 15 MIN: HCPCS | Mod: HHH

## 2025-05-23 ASSESSMENT — ENCOUNTER SYMPTOMS
CHANGE IN APPETITE: UNCHANGED
APPETITE LEVEL: GOOD
DENIES PAIN: 1
LAST BOWEL MOVEMENT: 67348

## 2025-05-25 DIAGNOSIS — N40.1 BENIGN LOCALIZED PROSTATIC HYPERPLASIA WITH LOWER URINARY TRACT SYMPTOMS (LUTS): ICD-10-CM

## 2025-05-26 ENCOUNTER — HOME CARE VISIT (OUTPATIENT)
Dept: HOME HEALTH SERVICES | Facility: HOME HEALTH | Age: 60
End: 2025-05-26
Payer: MEDICARE

## 2025-05-26 VITALS
DIASTOLIC BLOOD PRESSURE: 68 MMHG | HEART RATE: 84 BPM | RESPIRATION RATE: 20 BRPM | SYSTOLIC BLOOD PRESSURE: 110 MMHG | TEMPERATURE: 98.6 F | OXYGEN SATURATION: 98 %

## 2025-05-26 PROCEDURE — G0299 HHS/HOSPICE OF RN EA 15 MIN: HCPCS | Mod: HHH

## 2025-05-26 ASSESSMENT — ENCOUNTER SYMPTOMS
DRY SKIN: 1
PAIN LOCATION - PAIN DURATION: CONSTANT
PAIN LOCATION - EXACERBATING FACTORS: UNSURE
CHANGE IN APPETITE: UNCHANGED
APPETITE LEVEL: GOOD
BOWEL PATTERN NORMAL: 1
PAIN LOCATION - PAIN QUALITY: ACHE
SPUTUM AMOUNT: MODERATE
PAIN LOCATION - PAIN DURATION: VARIES
TREMORS: 1
LOWEST PAIN SEVERITY IN PAST 24 HOURS: 2/10
DIZZINESS: 1
LOSS OF SENSATION IN FEET: 1
PAIN LOCATION - PAIN SEVERITY: 7/10
LAST BOWEL MOVEMENT: 67350
FORGETFULNESS: 1
DEPRESSION: 0
PERSON REPORTING PAIN: PATIENT
PAIN: 1
PAIN LOCATION: BACK
PAIN LOCATION: GROIN
PAIN LOCATION - PAIN FREQUENCY: INTERMITTENT
SPUTUM PRODUCTION: 1
SPUTUM CONSISTENCY: THICK
PAIN LOCATION - PAIN SEVERITY: 6/10
COUGH: 1
PAIN LOCATION - PAIN QUALITY: ACHE
PAIN LOCATION - PAIN FREQUENCY: CONSTANT
SHORTNESS OF BREATH: 1
OCCASIONAL FEELINGS OF UNSTEADINESS: 0
SPUTUM COLOR: WHITE
DYSPNEA ACTIVITY LEVEL: AFTER AMBULATING MORE THAN 20 FT
PAIN LOCATION - RELIEVING FACTORS: UNSURE
PAIN SEVERITY GOAL: 0/10
PAIN LOCATION - EXACERBATING FACTORS: WALKING
HIGHEST PAIN SEVERITY IN PAST 24 HOURS: 7/10
STOOL FREQUENCY: DAILY
SUBJECTIVE PAIN PROGRESSION: UNCHANGED
PAIN LOCATION - RELIEVING FACTORS: REST

## 2025-05-26 ASSESSMENT — ACTIVITIES OF DAILY LIVING (ADL): MONEY MANAGEMENT (EXPENSES/BILLS): INDEPENDENT

## 2025-05-27 ENCOUNTER — APPOINTMENT (OUTPATIENT)
Dept: SURGERY | Facility: CLINIC | Age: 60
End: 2025-05-27
Payer: MEDICARE

## 2025-05-27 VITALS
RESPIRATION RATE: 18 BRPM | DIASTOLIC BLOOD PRESSURE: 62 MMHG | HEART RATE: 89 BPM | SYSTOLIC BLOOD PRESSURE: 94 MMHG | WEIGHT: 191 LBS | BODY MASS INDEX: 26.64 KG/M2

## 2025-05-27 DIAGNOSIS — Z71.9 HEALTH EDUCATION/COUNSELING: ICD-10-CM

## 2025-05-27 DIAGNOSIS — Z51.89 VISIT FOR WOUND CARE: Primary | ICD-10-CM

## 2025-05-27 PROCEDURE — 99024 POSTOP FOLLOW-UP VISIT: CPT | Performed by: PHYSICIAN ASSISTANT

## 2025-05-27 RX ORDER — TAMSULOSIN HYDROCHLORIDE 0.4 MG/1
CAPSULE ORAL
Qty: 180 CAPSULE | Refills: 3 | Status: SHIPPED | OUTPATIENT
Start: 2025-05-27

## 2025-05-27 ASSESSMENT — PAIN SCALES - GENERAL: PAINLEVEL_OUTOF10: 2

## 2025-05-30 ENCOUNTER — HOME CARE VISIT (OUTPATIENT)
Dept: HOME HEALTH SERVICES | Facility: HOME HEALTH | Age: 60
End: 2025-05-30
Payer: MEDICARE

## 2025-05-30 VITALS
HEART RATE: 86 BPM | RESPIRATION RATE: 16 BRPM | SYSTOLIC BLOOD PRESSURE: 118 MMHG | DIASTOLIC BLOOD PRESSURE: 64 MMHG | TEMPERATURE: 97.7 F | OXYGEN SATURATION: 98 %

## 2025-05-30 PROCEDURE — G0300 HHS/HOSPICE OF LPN EA 15 MIN: HCPCS | Mod: HHH

## 2025-05-30 ASSESSMENT — ENCOUNTER SYMPTOMS
LAST BOWEL MOVEMENT: 67354
DENIES PAIN: 1
APPETITE LEVEL: GOOD
CHANGE IN APPETITE: UNCHANGED

## 2025-06-02 ENCOUNTER — HOME CARE VISIT (OUTPATIENT)
Dept: HOME HEALTH SERVICES | Facility: HOME HEALTH | Age: 60
End: 2025-06-02
Payer: MEDICARE

## 2025-06-02 PROCEDURE — G0300 HHS/HOSPICE OF LPN EA 15 MIN: HCPCS | Mod: HHH

## 2025-06-02 ASSESSMENT — ENCOUNTER SYMPTOMS
LAST BOWEL MOVEMENT: 67358
APPETITE LEVEL: GOOD
PAIN: 1
HIGHEST PAIN SEVERITY IN PAST 24 HOURS: 6/10

## 2025-06-04 ENCOUNTER — TELEPHONE (OUTPATIENT)
Dept: PRIMARY CARE | Facility: CLINIC | Age: 60
End: 2025-06-04
Payer: MEDICARE

## 2025-06-04 ENCOUNTER — TELEPHONE (OUTPATIENT)
Facility: CLINIC | Age: 60
End: 2025-06-04
Payer: MEDICARE

## 2025-06-04 NOTE — TELEPHONE ENCOUNTER
LM with new appt details. Explained change in provider schedule and needing to reschedule to next available. Provided office number if date/time does not work.

## 2025-06-06 ENCOUNTER — HOME CARE VISIT (OUTPATIENT)
Dept: HOME HEALTH SERVICES | Facility: HOME HEALTH | Age: 60
End: 2025-06-06
Payer: MEDICARE

## 2025-06-06 VITALS
OXYGEN SATURATION: 97 % | DIASTOLIC BLOOD PRESSURE: 58 MMHG | SYSTOLIC BLOOD PRESSURE: 110 MMHG | HEART RATE: 85 BPM | RESPIRATION RATE: 16 BRPM | TEMPERATURE: 98.5 F

## 2025-06-06 PROCEDURE — G0300 HHS/HOSPICE OF LPN EA 15 MIN: HCPCS | Mod: HHH

## 2025-06-06 ASSESSMENT — ENCOUNTER SYMPTOMS
DENIES PAIN: 1
LAST BOWEL MOVEMENT: 67361
CHANGE IN APPETITE: UNCHANGED
APPETITE LEVEL: GOOD

## 2025-06-06 NOTE — TELEPHONE ENCOUNTER
Patient taking an NSAID daily for that long would recommend switching to a prescribed NSAID such as Celebrex or meloxicam to avoid gastritis or gastrointestinal bleeding.

## 2025-06-09 ENCOUNTER — HOME CARE VISIT (OUTPATIENT)
Dept: HOME HEALTH SERVICES | Facility: HOME HEALTH | Age: 60
End: 2025-06-09
Payer: MEDICARE

## 2025-06-09 VITALS
HEART RATE: 87 BPM | RESPIRATION RATE: 16 BRPM | DIASTOLIC BLOOD PRESSURE: 82 MMHG | SYSTOLIC BLOOD PRESSURE: 130 MMHG | TEMPERATURE: 98.8 F | OXYGEN SATURATION: 98 %

## 2025-06-09 PROCEDURE — G0300 HHS/HOSPICE OF LPN EA 15 MIN: HCPCS | Mod: HHH

## 2025-06-09 ASSESSMENT — ENCOUNTER SYMPTOMS
LAST BOWEL MOVEMENT: 67365
CHANGE IN APPETITE: UNCHANGED
DENIES PAIN: 1
APPETITE LEVEL: GOOD

## 2025-06-12 ENCOUNTER — APPOINTMENT (OUTPATIENT)
Facility: CLINIC | Age: 60
End: 2025-06-12
Payer: MEDICARE

## 2025-06-13 ENCOUNTER — HOME CARE VISIT (OUTPATIENT)
Dept: HOME HEALTH SERVICES | Facility: HOME HEALTH | Age: 60
End: 2025-06-13
Payer: MEDICARE

## 2025-06-13 VITALS
HEART RATE: 85 BPM | OXYGEN SATURATION: 98 % | RESPIRATION RATE: 16 BRPM | SYSTOLIC BLOOD PRESSURE: 110 MMHG | TEMPERATURE: 98.4 F | DIASTOLIC BLOOD PRESSURE: 62 MMHG

## 2025-06-13 PROCEDURE — G0300 HHS/HOSPICE OF LPN EA 15 MIN: HCPCS | Mod: HHH

## 2025-06-13 ASSESSMENT — ENCOUNTER SYMPTOMS
CHANGE IN APPETITE: UNCHANGED
APPETITE LEVEL: GOOD
DENIES PAIN: 1
LAST BOWEL MOVEMENT: 67368

## 2025-06-14 DIAGNOSIS — I20.9 ANGINA PECTORIS: ICD-10-CM

## 2025-06-14 DIAGNOSIS — R07.9 CHEST PAIN, UNSPECIFIED TYPE: ICD-10-CM

## 2025-06-16 ENCOUNTER — HOME CARE VISIT (OUTPATIENT)
Dept: HOME HEALTH SERVICES | Facility: HOME HEALTH | Age: 60
End: 2025-06-16
Payer: MEDICARE

## 2025-06-16 VITALS
TEMPERATURE: 98.7 F | OXYGEN SATURATION: 98 % | RESPIRATION RATE: 16 BRPM | SYSTOLIC BLOOD PRESSURE: 110 MMHG | HEART RATE: 80 BPM | DIASTOLIC BLOOD PRESSURE: 58 MMHG

## 2025-06-16 PROCEDURE — G0300 HHS/HOSPICE OF LPN EA 15 MIN: HCPCS | Mod: HHH

## 2025-06-16 RX ORDER — NITROGLYCERIN 0.4 MG/1
0.4 TABLET SUBLINGUAL EVERY 5 MIN PRN
Qty: 25 TABLET | Refills: 1 | Status: SHIPPED | OUTPATIENT
Start: 2025-06-16

## 2025-06-16 ASSESSMENT — ENCOUNTER SYMPTOMS
DENIES PAIN: 1
LAST BOWEL MOVEMENT: 67368
APPETITE LEVEL: GOOD
CHANGE IN APPETITE: UNCHANGED

## 2025-06-16 NOTE — TELEPHONE ENCOUNTER
Please send the attached prescription to the patient's pharmacy as soon as possible. Thank you!    Requested Prescriptions     Pending Prescriptions Disp Refills    nitroglycerin (Nitrostat) 0.4 mg SL tablet [Pharmacy Med Name: Nitroglycerin Sublingual Tablet Sublingual 0.4 MG] 25 tablet 1     Sig: Place 1 tablet (0.4 mg) under the tongue every 5 minutes if needed for chest pain (If pain persists for more than 15 minutes, CALL 911!).

## 2025-06-18 DIAGNOSIS — M54.42 CHRONIC BILATERAL LOW BACK PAIN WITH SCIATICA, SCIATICA LATERALITY UNSPECIFIED: Primary | ICD-10-CM

## 2025-06-18 DIAGNOSIS — M54.41 CHRONIC BILATERAL LOW BACK PAIN WITH SCIATICA, SCIATICA LATERALITY UNSPECIFIED: Primary | ICD-10-CM

## 2025-06-18 DIAGNOSIS — G89.29 CHRONIC BILATERAL LOW BACK PAIN WITH SCIATICA, SCIATICA LATERALITY UNSPECIFIED: Primary | ICD-10-CM

## 2025-06-18 RX ORDER — NAPROXEN 500 MG/1
500 TABLET ORAL
Qty: 60 TABLET | Refills: 0 | Status: SHIPPED | OUTPATIENT
Start: 2025-06-18

## 2025-06-20 ENCOUNTER — HOME CARE VISIT (OUTPATIENT)
Dept: HOME HEALTH SERVICES | Facility: HOME HEALTH | Age: 60
End: 2025-06-20
Payer: MEDICARE

## 2025-06-20 VITALS
TEMPERATURE: 98.6 F | OXYGEN SATURATION: 97 % | RESPIRATION RATE: 20 BRPM | HEART RATE: 92 BPM | DIASTOLIC BLOOD PRESSURE: 62 MMHG | SYSTOLIC BLOOD PRESSURE: 112 MMHG

## 2025-06-20 PROCEDURE — G0299 HHS/HOSPICE OF RN EA 15 MIN: HCPCS | Mod: HHH

## 2025-06-20 ASSESSMENT — ENCOUNTER SYMPTOMS
AGITATION: 1
PAIN SEVERITY GOAL: 0/10
SHORTNESS OF BREATH: 1
SPUTUM AMOUNT: SCANT
SPUTUM CONSISTENCY: THICK
SUBJECTIVE PAIN PROGRESSION: UNCHANGED
BOWEL PATTERN NORMAL: 1
SPUTUM PRODUCTION: 1
APPETITE LEVEL: GOOD
PAIN LOCATION - PAIN SEVERITY: 7/10
FORGETFULNESS: 1
COUGH CHARACTERISTICS: PRODUCTIVE
DYSURIA: 1
DEPRESSION: 0
MUSCLE WEAKNESS: 1
LOSS OF SENSATION IN FEET: 1
PAIN LOCATION: BACK
PAIN LOCATION - PAIN DURATION: CONSTANT
LAST BOWEL MOVEMENT: 67376
DYSPNEA ACTIVITY LEVEL: AFTER AMBULATING MORE THAN 20 FT
PAIN LOCATION - EXACERBATING FACTORS: MOVEMENT
DRY SKIN: 1
COUGH: 1
HIGHEST PAIN SEVERITY IN PAST 24 HOURS: 9/10
FATIGUE: 1
RHINORRHEA: 1
STOOL FREQUENCY: LESS THAN DAILY
PAIN LOCATION - RELIEVING FACTORS: REST
OCCASIONAL FEELINGS OF UNSTEADINESS: 1
SPUTUM COLOR: TAN
PAIN: 1
PERSON REPORTING PAIN: PATIENT
CHANGE IN APPETITE: UNCHANGED
PAIN LOCATION - PAIN FREQUENCY: CONSTANT
PAIN LOCATION - PAIN QUALITY: ACHE

## 2025-06-20 ASSESSMENT — ACTIVITIES OF DAILY LIVING (ADL): MONEY MANAGEMENT (EXPENSES/BILLS): INDEPENDENT

## 2025-06-23 ENCOUNTER — HOME CARE VISIT (OUTPATIENT)
Dept: HOME HEALTH SERVICES | Facility: HOME HEALTH | Age: 60
End: 2025-06-23
Payer: MEDICARE

## 2025-06-23 VITALS
RESPIRATION RATE: 16 BRPM | DIASTOLIC BLOOD PRESSURE: 62 MMHG | TEMPERATURE: 98.7 F | HEART RATE: 88 BPM | OXYGEN SATURATION: 100 % | SYSTOLIC BLOOD PRESSURE: 100 MMHG

## 2025-06-23 PROCEDURE — G0300 HHS/HOSPICE OF LPN EA 15 MIN: HCPCS | Mod: HHH

## 2025-06-23 ASSESSMENT — ENCOUNTER SYMPTOMS
LAST BOWEL MOVEMENT: 67378
APPETITE LEVEL: GOOD
CHANGE IN APPETITE: UNCHANGED
DENIES PAIN: 1

## 2025-06-23 NOTE — PROGRESS NOTES
[facility]  TRAUMA CLINIC PROGRESS NOTE    Patient Name: Orlin Laguerre  MRN: [id number]  Admit Date:   : 1965  AGE: 60 y.o.   GENDER: male  ==============================================================================  CHIEF COMPLAINT:   Samm gangrene (2025 - 2025)     OTHER MEDICAL PROBLEMS:  DMII     INCIDENTAL FINDINGS:  NA     PROCEDURES:  2025: DEBRIDEMENT, PERINEAL WOUND   2025: Exam under anesthesia, debridement of perineal wound.      PATHOLOGY:  NA  ==============================================================================  TODAY'S ASSESSMENT AND PLAN OF CARE:  WOUND CARE - Perineal Wound   - Take daily showers  - Allow warm, soapy water to wash over wound  - Do not scrub at the wound  - When out of the shower, gently pat the wound dry.  - Do not apply lotions, ointments or creams  - Avoid soaking in bodies of water (bathtub, hot tubs, pools, lakes, etc) until wound is completely healed    PACKING  - No need to continue packing wound wet to wet at this time   - When out of the house please cover wound with dry clean dressing not covering all four sides   - When at home okay to leave open to air     FOLLOW UP/CALL  - 6 week trauma/ACS follow up for wound check   - Return to clinic or ER sooner if pt. has any development of erythema, drainage, swelling, pain, fevers, or chills  - If you have questions or concerns that are not urgent, please feel free to call  [phone].  - Call [phone] to make additional appointment(s) as needed if unable to reschedule in office today    ==============================================================================  HISTORY OF PRESENT ILLNESS  Mr. Laguerre is a 60 y/o M following up after hospitalization from  -  s/p [facility]STI to [facility]. [facility] and then was [facility] to [facility] for on going procedures. Patient arrives today for ongoing   post operative/ wound check. Last office visit was 5/27/25.   Today patient states that he ran out of normal saline at home and has not changed the dressing since he ran out. He has been covering all four sides of the wound with an occlusive dressing.   Patient is eating, drinking, voiding and having flatus, bowel movements.   MEDICAL HISTORY / ROS:  Admission history and ROS reviewed.   Patient denies:  fevers; chills; headache;  dizziness; chest pain; shortness of breath; nausea/vomiting/diarrhea/constipation; new/worsening abdominal pain or numbness/tingling/weakness of extremities.   Pertinent changes as follows:  N/A    PHYSICAL EXAM:  GCS 15, A+OX3, RRR, S1, S2, CTA=, no increased WOB. Abd soft, nt, nd. MAEx4, ROSA 5/5 x4, no extremity edema noted. 2+pp.   Wound location: Right groin (picture in chart)   Wound length: 7.6 cm   Wound width: 2 cm   Wound depth: 1.6 cm   Wound description: Healthy wound bed, wet edges. Granulation tissue at skin level     LABS:  No results found. However, due to the size of the patient record, not all encounters were searched. Please check Results Review for a complete set of results.  MEDICATIONS:  Current Medications[1]    IMAGING SUMMARY:  (summary of new imaging findings, not a copy of dictation)  N/A    I have reviewed all laboratory and imaging results ordered/pertinent for today's encounter.   I spent 24 minutes reviewing this patients chart, medications, vitals, labs, performing history and physical exam, and formulating a plan. >50% of time was spent educating and counseling patient.           [1]   Current Outpatient Medications   Medication Sig Dispense Refill    albuterol 90 mcg/actuation inhaler INHALE 2 PUFFS EVERY 4 HOURS AS NEEDED 18 g 3    aspirin 81 mg EC tablet Take 1 tablet (81 mg) by mouth once daily. For 30 days      atenoloL-chlorthalidone (Tenoretic 50) 50-25 mg tablet Take 1 tablet by mouth once daily. 90 tablet 1    DULoxetine (Cymbalta) 60 mg DR capsule  "Take 1 capsule (60 mg) by mouth once daily. For 30 days 90 capsule 1     mg tablet TAKE 1 TABLET THREE TIMES DAILY AS NEEDED. TAKE WITH FOOD OR MILK. 270 tablet 3    icosapent ethyL (Vascepa) 1 gram capsule Take 2 capsules (2 g) by mouth 2 times daily (morning and late afternoon). 360 capsule 1    insulin aspart (NovoLOG Flexpen U-100 Insulin) 100 unit/mL (3 mL) pen INJECT UP TO 50 UNITS UNDER THE SKIN DAILY AS DIRECTED. DISCARD PEN 28 DAYS AFTER OPENING 45 mL 3    Lantus Solostar U-100 Insulin 100 unit/mL (3 mL) pen INJECT 90 UNITS UNDER THE SKIN ONCE DAILY AT BEDTIME. 90 mL 3    losartan (Cozaar) 25 mg tablet Take 1 tablet (25 mg) by mouth once daily. 90 tablet 1    methocarbamol (Robaxin) 750 mg tablet TAKE 1 TABLET EVERY 4 HOURS AS NEEDED 270 tablet 3    naproxen (Naprosyn) 500 mg tablet Take 1 tablet (500 mg) by mouth 2 times daily (morning and late afternoon). 60 tablet 0    nitroglycerin (Nitrostat) 0.4 mg SL tablet Place 1 tablet (0.4 mg) under the tongue every 5 minutes if needed for chest pain (If pain persists for more than 15 minutes, CALL 911!). 25 tablet 1    omeprazole (PriLOSEC) 40 mg DR capsule Take 1 capsule (40 mg) by mouth once daily. 90 capsule 1    pen needle 1/2\" (BD Ultra-Fine Orig Pen Needle) 29G X 12mm needle Use as instructed 100 each 3    phenytoin ER (Dilantin) 100 mg capsule TAKE 1 CAPSULE EVERY DAY 90 capsule 3    QUEtiapine (SEROquel) 100 mg tablet Take 1 tablet (100 mg) by mouth once daily at bedtime. For 30 days 90 tablet 1    rosuvastatin (Crestor) 40 mg tablet Take 1 tablet (40 mg) by mouth once daily. 90 tablet 1    SUMAtriptan (Imitrex) 50 mg tablet Take 1 tablet (50 mg) by mouth once daily as needed. At least 2 hours between doses for 60 days      tamsulosin (Flomax) 0.4 mg 24 hr capsule TAKE 2 CAPSULES ONE TIME DAILY 180 capsule 3    tirzepatide (Mounjaro) 10 mg/0.5 mL pen injector Inject 10 mg under the skin 1 (one) time per week. 6 mL 0    traZODone (Desyrel) 100 mg " tablet Take 1 tablet (100 mg) by mouth as needed at bedtime for sleep. 30 tablet 0     No current facility-administered medications for this visit.

## 2025-06-26 DIAGNOSIS — F51.01 PRIMARY INSOMNIA: ICD-10-CM

## 2025-06-26 RX ORDER — TRAZODONE HYDROCHLORIDE 100 MG/1
100 TABLET ORAL NIGHTLY PRN
Qty: 90 TABLET | Refills: 1 | Status: SHIPPED | OUTPATIENT
Start: 2025-06-26

## 2025-06-27 ENCOUNTER — HOME CARE VISIT (OUTPATIENT)
Dept: HOME HEALTH SERVICES | Facility: HOME HEALTH | Age: 60
End: 2025-06-27
Payer: MEDICARE

## 2025-06-27 VITALS
SYSTOLIC BLOOD PRESSURE: 110 MMHG | DIASTOLIC BLOOD PRESSURE: 60 MMHG | TEMPERATURE: 98.8 F | OXYGEN SATURATION: 99 % | RESPIRATION RATE: 16 BRPM | HEART RATE: 80 BPM

## 2025-06-27 PROCEDURE — G0300 HHS/HOSPICE OF LPN EA 15 MIN: HCPCS | Mod: HHH

## 2025-06-28 ASSESSMENT — ENCOUNTER SYMPTOMS
DENIES PAIN: 1
APPETITE LEVEL: GOOD
CHANGE IN APPETITE: UNCHANGED
LAST BOWEL MOVEMENT: 67383

## 2025-06-30 ENCOUNTER — HOME CARE VISIT (OUTPATIENT)
Dept: HOME HEALTH SERVICES | Facility: HOME HEALTH | Age: 60
End: 2025-06-30
Payer: MEDICARE

## 2025-06-30 VITALS
HEART RATE: 75 BPM | RESPIRATION RATE: 12 BRPM | TEMPERATURE: 96.4 F | DIASTOLIC BLOOD PRESSURE: 60 MMHG | OXYGEN SATURATION: 98 % | SYSTOLIC BLOOD PRESSURE: 108 MMHG

## 2025-06-30 PROCEDURE — G0299 HHS/HOSPICE OF RN EA 15 MIN: HCPCS | Mod: HHH

## 2025-06-30 SDOH — ECONOMIC STABILITY: FOOD INSECURITY: MEALS PER DAY: 2

## 2025-06-30 ASSESSMENT — PAIN SCALES - PAIN ASSESSMENT IN ADVANCED DEMENTIA (PAINAD)
CONSOLABILITY: 0
NEGVOCALIZATION: 0
TOTALSCORE: 0
BODYLANGUAGE: 0 - RELAXED.
BREATHING: 0
NEGVOCALIZATION: 0 - NONE.
BODYLANGUAGE: 0
CONSOLABILITY: 0 - NO NEED TO CONSOLE.
FACIALEXPRESSION: 0 - SMILING OR INEXPRESSIVE.
FACIALEXPRESSION: 0

## 2025-06-30 ASSESSMENT — ENCOUNTER SYMPTOMS
PERSON REPORTING PAIN: PATIENT
LOWEST PAIN SEVERITY IN PAST 24 HOURS: 4/10
PAIN LOCATION - PAIN DURATION: DAILY
PAIN LOCATION - EXACERBATING FACTORS: WOUND
FATIGUE: 1
SUBJECTIVE PAIN PROGRESSION: WAXING AND WANING
APPETITE LEVEL: FAIR
PAIN LOCATION - PAIN SEVERITY: 6/10
PAIN: 1
PAIN SEVERITY GOAL: 4/10
HIGHEST PAIN SEVERITY IN PAST 24 HOURS: 6/10
PAIN LOCATION - PAIN QUALITY: ACHE
PAIN LOCATION - PAIN FREQUENCY: INTERMITTENT
PAIN LOCATION: GROIN

## 2025-07-05 ENCOUNTER — HOME CARE VISIT (OUTPATIENT)
Dept: HOME HEALTH SERVICES | Facility: HOME HEALTH | Age: 60
End: 2025-07-05
Payer: MEDICARE

## 2025-07-05 VITALS
HEART RATE: 88 BPM | TEMPERATURE: 98.2 F | RESPIRATION RATE: 16 BRPM | DIASTOLIC BLOOD PRESSURE: 60 MMHG | SYSTOLIC BLOOD PRESSURE: 98 MMHG | OXYGEN SATURATION: 97 %

## 2025-07-05 PROCEDURE — G0299 HHS/HOSPICE OF RN EA 15 MIN: HCPCS | Mod: HHH

## 2025-07-05 ASSESSMENT — ENCOUNTER SYMPTOMS
SPUTUM PRODUCTION: 1
PAIN LOCATION - PAIN FREQUENCY: CONSTANT
DYSPNEA ACTIVITY LEVEL: AFTER AMBULATING 10 - 20 FT
LOSS OF SENSATION IN FEET: 0
DIZZINESS: 1
DEPRESSION: 0
LAST BOWEL MOVEMENT: 67390
SHORTNESS OF BREATH: 1
MUSCLE WEAKNESS: 1
PAIN LOCATION: BACK
PAIN SEVERITY GOAL: 0/10
BOWEL PATTERN NORMAL: 1
FORGETFULNESS: 1
SPUTUM COLOR: CLEAR
COUGH CHARACTERISTICS: PRODUCTIVE
PAIN LOCATION - PAIN DURATION: CONSTANT
RHINORRHEA: 1
COUGH: 1
SPUTUM AMOUNT: MODERATE
HIGHEST PAIN SEVERITY IN PAST 24 HOURS: 8/10
APPETITE LEVEL: GOOD
CHANGE IN APPETITE: UNCHANGED
OCCASIONAL FEELINGS OF UNSTEADINESS: 1
PAIN: 1
LOWEST PAIN SEVERITY IN PAST 24 HOURS: 5/10
SUBJECTIVE PAIN PROGRESSION: UNCHANGED
PAIN LOCATION - EXACERBATING FACTORS: MOVEMENTS
TINGLING: 1
DRY SKIN: 1
PAIN LOCATION - PAIN QUALITY: ACHE
STOOL FREQUENCY: LESS THAN DAILY
PAIN LOCATION - RELIEVING FACTORS: REST
PAIN LOCATION - PAIN SEVERITY: 6/10

## 2025-07-05 ASSESSMENT — ACTIVITIES OF DAILY LIVING (ADL)
AMBULATION ASSISTANCE: 1
MONEY MANAGEMENT (EXPENSES/BILLS): INDEPENDENT
AMBULATION ASSISTANCE: STAND BY ASSIST

## 2025-07-08 ENCOUNTER — CLINICAL SUPPORT (OUTPATIENT)
Dept: SURGERY | Facility: CLINIC | Age: 60
End: 2025-07-08
Payer: MEDICARE

## 2025-07-08 ENCOUNTER — APPOINTMENT (OUTPATIENT)
Dept: OTOLARYNGOLOGY | Facility: CLINIC | Age: 60
End: 2025-07-08
Payer: MEDICARE

## 2025-07-08 ENCOUNTER — OFFICE VISIT (OUTPATIENT)
Dept: CARDIOLOGY | Facility: CLINIC | Age: 60
End: 2025-07-08
Payer: MEDICARE

## 2025-07-08 ENCOUNTER — APPOINTMENT (OUTPATIENT)
Dept: SURGERY | Facility: CLINIC | Age: 60
End: 2025-07-08
Payer: MEDICARE

## 2025-07-08 VITALS
BODY MASS INDEX: 27.02 KG/M2 | HEART RATE: 80 BPM | SYSTOLIC BLOOD PRESSURE: 120 MMHG | RESPIRATION RATE: 16 BRPM | DIASTOLIC BLOOD PRESSURE: 77 MMHG | HEIGHT: 71 IN | WEIGHT: 193 LBS

## 2025-07-08 VITALS
HEIGHT: 71 IN | DIASTOLIC BLOOD PRESSURE: 62 MMHG | OXYGEN SATURATION: 99 % | BODY MASS INDEX: 26.6 KG/M2 | HEART RATE: 73 BPM | WEIGHT: 190 LBS | SYSTOLIC BLOOD PRESSURE: 95 MMHG

## 2025-07-08 DIAGNOSIS — E66.9 TYPE 2 DIABETES MELLITUS WITH OBESITY (MULTI): ICD-10-CM

## 2025-07-08 DIAGNOSIS — Z51.89 ENCOUNTER FOR WOUND CARE: Primary | ICD-10-CM

## 2025-07-08 DIAGNOSIS — I25.85 CHRONIC CORONARY MICROVASCULAR DYSFUNCTION: Primary | ICD-10-CM

## 2025-07-08 DIAGNOSIS — I10 ESSENTIAL HYPERTENSION: ICD-10-CM

## 2025-07-08 DIAGNOSIS — E11.69 TYPE 2 DIABETES MELLITUS WITH OBESITY (MULTI): ICD-10-CM

## 2025-07-08 DIAGNOSIS — E11.9 ALTERED METABOLISM DUE TO DIABETES (MULTI): ICD-10-CM

## 2025-07-08 DIAGNOSIS — I73.9 PERIPHERAL VASCULAR DISEASE: ICD-10-CM

## 2025-07-08 DIAGNOSIS — E78.2 MIXED HYPERLIPIDEMIA: ICD-10-CM

## 2025-07-08 DIAGNOSIS — F17.200 TOBACCO USE DISORDER: ICD-10-CM

## 2025-07-08 DIAGNOSIS — Z71.9 HEALTH EDUCATION/COUNSELING: ICD-10-CM

## 2025-07-08 PROBLEM — J81.0 ACUTE PULMONARY EDEMA: Status: RESOLVED | Noted: 2023-08-24 | Resolved: 2025-07-08

## 2025-07-08 PROCEDURE — 99214 OFFICE O/P EST MOD 30 MIN: CPT | Performed by: INTERNAL MEDICINE

## 2025-07-08 PROCEDURE — 99213 OFFICE O/P EST LOW 20 MIN: CPT | Performed by: PHYSICIAN ASSISTANT

## 2025-07-08 PROCEDURE — 3048F LDL-C <100 MG/DL: CPT | Performed by: INTERNAL MEDICINE

## 2025-07-08 PROCEDURE — 3008F BODY MASS INDEX DOCD: CPT | Performed by: INTERNAL MEDICINE

## 2025-07-08 PROCEDURE — 3078F DIAST BP <80 MM HG: CPT | Performed by: INTERNAL MEDICINE

## 2025-07-08 PROCEDURE — 99407 BEHAV CHNG SMOKING > 10 MIN: CPT | Performed by: INTERNAL MEDICINE

## 2025-07-08 PROCEDURE — G2211 COMPLEX E/M VISIT ADD ON: HCPCS | Performed by: INTERNAL MEDICINE

## 2025-07-08 PROCEDURE — 3074F SYST BP LT 130 MM HG: CPT | Performed by: INTERNAL MEDICINE

## 2025-07-08 PROCEDURE — 3062F POS MACROALBUMINURIA REV: CPT | Performed by: INTERNAL MEDICINE

## 2025-07-08 PROCEDURE — 3044F HG A1C LEVEL LT 7.0%: CPT | Performed by: INTERNAL MEDICINE

## 2025-07-08 RX ORDER — ATENOLOL AND CHLORTHALIDONE TABLET 50; 25 MG/1; MG/1
1 TABLET ORAL DAILY
Qty: 90 TABLET | Refills: 3 | Status: SHIPPED | OUTPATIENT
Start: 2025-07-08 | End: 2026-07-08

## 2025-07-08 RX ORDER — ROSUVASTATIN CALCIUM 40 MG/1
40 TABLET, COATED ORAL DAILY
Qty: 90 TABLET | Refills: 3 | Status: SHIPPED | OUTPATIENT
Start: 2025-07-08 | End: 2026-07-08

## 2025-07-08 ASSESSMENT — PAIN SCALES - GENERAL
PAINLEVEL_OUTOF10: 2
PAINLEVEL_OUTOF10: 0-NO PAIN

## 2025-07-08 ASSESSMENT — LIFESTYLE VARIABLES
AUDIT-C TOTAL SCORE: 1
HOW OFTEN DO YOU HAVE SIX OR MORE DRINKS ON ONE OCCASION: NEVER
HOW OFTEN DO YOU HAVE A DRINK CONTAINING ALCOHOL: MONTHLY OR LESS
HAVE YOU OR SOMEONE ELSE BEEN INJURED AS A RESULT OF YOUR DRINKING: NO
HOW MANY STANDARD DRINKS CONTAINING ALCOHOL DO YOU HAVE ON A TYPICAL DAY: 1 OR 2
SKIP TO QUESTIONS 9-10: 1
HAS A RELATIVE, FRIEND, DOCTOR, OR ANOTHER HEALTH PROFESSIONAL EXPRESSED CONCERN ABOUT YOUR DRINKING OR SUGGESTED YOU CUT DOWN: NO
AUDIT TOTAL SCORE: 1

## 2025-07-08 ASSESSMENT — ENCOUNTER SYMPTOMS
OCCASIONAL FEELINGS OF UNSTEADINESS: 1
LOSS OF SENSATION IN FEET: 0
DEPRESSION: 0

## 2025-07-08 NOTE — PROGRESS NOTES
[facility] [place]        Subjective   Chief Complaint   Patient presents with   • Follow-up     Michael Laguerre presents to the office today for a 7 month follow up visit.           60-year-old patient with a history of hypertension, hyperlipidemia diabetes type 2.  Patient again gets episodes of SGLT2 Jardiance.  Also currently on Mounjaro.  History of diabetes type 2.  History of chronic diastolic dysfunction with a normal ejection fraction.  Elevated hemoglobin A1c last year.  No active chest pain tightness.  Patient here for routine evaluation.  Does have history of smoking.  I reviewed independently patient's BMP which was done May 12.  Which is in normal range with a CBC 11.2 and hemoglobin A1c 6.8%.  LDL was 23 only.    He has a past medical history of Acute pulmonary edema (08/24/2023), Altered metabolism due to diabetes (Multi), Angina pectoris (08/24/2023), Cervicalgia, Coronary artery disease (08/24/2023), Diabetes (Multi), 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 (11/04/2015), Essential hypertension (08/24/2023), Foreign body in cornea, left eye, initial encounter (11/04/2015), Localized traumatic opacities, right eye (10/26/2015), Mixed hyperlipidemia (08/24/2023), Other conditions influencing health status, Other conditions influencing health status, Pain in unspecified hip, Palpitations (08/24/2023), Peripheral vascular disease (08/24/2023), Personal history of other diseases of the circulatory system, Personal history of other diseases of the digestive system, Personal history of other diseases of the digestive system, Personal history of other diseases of the musculoskeletal system and connective tissue, Personal history of other diseases of the nervous system and sense organs, Personal history of other diseases of the nervous system and sense organs, Personal history of other diseases of the nervous system and sense organs, Personal history of other  diseases of the respiratory system, Personal history of other mental and behavioral disorders, Shortness of breath (01/16/2024), Systolic heart failure (08/22/2022), Unspecified blepharitis left eye, unspecified eyelid (10/14/2015), Unspecified blepharitis left lower eyelid (10/14/2015), Unspecified blepharitis left lower eyelid (10/14/2015), Unspecified blepharitis left upper eyelid (10/14/2015), Unspecified blepharitis left upper eyelid (10/14/2015), Unspecified blepharitis right lower eyelid (10/14/2015), Unspecified blepharitis right lower eyelid (10/14/2015), Unspecified blepharitis right upper eyelid (10/14/2015), Unspecified blepharitis right upper eyelid (10/14/2015), Unspecified injury of left eye and orbit, initial encounter (11/04/2015), and Vasovagal syncope (01/16/2024).  He has a past surgical history that includes CT guided chest tube placement (12/21/2017) and CT guided percutaneous peritoneal or retroperitoneal fluid collection drainage (12/21/2017).   No relevant family history has been documented for this patient.  Current Outpatient Medications   Medication Sig Dispense Refill   • albuterol 90 mcg/actuation inhaler INHALE 2 PUFFS EVERY 4 HOURS AS NEEDED 18 g 3   • aspirin 81 mg EC tablet Take 1 tablet (81 mg) by mouth once daily. For 30 days     • DULoxetine (Cymbalta) 60 mg DR capsule Take 1 capsule (60 mg) by mouth once daily. For 30 days 90 capsule 1   •  mg tablet TAKE 1 TABLET THREE TIMES DAILY AS NEEDED. TAKE WITH FOOD OR MILK. 270 tablet 3   • icosapent ethyL (Vascepa) 1 gram capsule Take 2 capsules (2 g) by mouth 2 times daily (morning and late afternoon). 360 capsule 1   • insulin aspart (NovoLOG Flexpen U-100 Insulin) 100 unit/mL (3 mL) pen INJECT UP TO 50 UNITS UNDER THE SKIN DAILY AS DIRECTED. DISCARD PEN 28 DAYS AFTER OPENING 45 mL 3   • Lantus Solostar U-100 Insulin 100 unit/mL (3 mL) pen INJECT 90 UNITS UNDER THE SKIN ONCE DAILY AT BEDTIME. 90 mL 3   • methocarbamol  "(Robaxin) 750 mg tablet TAKE 1 TABLET EVERY 4 HOURS AS NEEDED 270 tablet 3   • naproxen (Naprosyn) 500 mg tablet Take 1 tablet (500 mg) by mouth 2 times daily (morning and late afternoon). 60 tablet 0   • nitroglycerin (Nitrostat) 0.4 mg SL tablet Place 1 tablet (0.4 mg) under the tongue every 5 minutes if needed for chest pain (If pain persists for more than 15 minutes, CALL 911!). 25 tablet 1   • omeprazole (PriLOSEC) 40 mg DR capsule Take 1 capsule (40 mg) by mouth once daily. 90 capsule 1   • pen needle 1/2\" (BD Ultra-Fine Orig Pen Needle) 29G X 12mm needle Use as instructed 100 each 3   • phenytoin ER (Dilantin) 100 mg capsule TAKE 1 CAPSULE EVERY DAY 90 capsule 3   • QUEtiapine (SEROquel) 100 mg tablet Take 1 tablet (100 mg) by mouth once daily at bedtime. For 30 days 90 tablet 1   • SUMAtriptan (Imitrex) 50 mg tablet Take 1 tablet (50 mg) by mouth once daily as needed. At least 2 hours between doses for 60 days     • tamsulosin (Flomax) 0.4 mg 24 hr capsule TAKE 2 CAPSULES ONE TIME DAILY 180 capsule 3   • tirzepatide (Mounjaro) 10 mg/0.5 mL pen injector Inject 10 mg under the skin 1 (one) time per week. 6 mL 0   • traZODone (Desyrel) 100 mg tablet TAKE 1 TABLET AT BEDTIME AS NEEDED FOR SLEEP 90 tablet 1   • atenoloL-chlorthalidone (Tenoretic 50) 50-25 mg tablet Take 1 tablet by mouth once daily. 90 tablet 3   • rosuvastatin (Crestor) 40 mg tablet Take 1 tablet (40 mg) by mouth once daily. 90 tablet 3     No current facility-administered medications for this visit.      reports that he has been smoking cigarettes. He started smoking about 46 years ago. He has a 91.4 pack-year smoking history. He has never been exposed to tobacco smoke. He has never used smokeless tobacco. He reports current alcohol use. He reports current drug use. Drug: Marijuana.  Allergies:  Varenicline, Metformin hcl, and Tramadol    ROS: See HPI  CONSTITUTIONAL: Chills- none. Fever- none. Weight change appropriate for age.  HEENT: " "Headache- Negative.  Change in vision- none.  Ear pain- none. Nasal congestion- none. Post-nasal drip-none.  Sore throat-none.  CARDIOLOGY: Chest pain- none.  Leg edema-trace.  Murmurs-soft systolic.  Palpitation- none.  RESPIRATORY: Denies any shortness of breath.  GI: Abdominal pain- none.  Change in bowel habits- none.  Constipation- none.  Diarrhea- none.  Nausea- none.  Vomiting- none.  MUSCULOSKELETAL: Joint pain- none.  Muscle aches- none.  DERMATOLOGY: Rash- none.  NEUROLOGY: Dizziness- none.   Headache- none.  PSYCHIATRY: Denies any depression or anxiety     Vitals:    07/08/25 1507   BP: 95/62   Pulse: 73   SpO2: 99%   Weight: 86.2 kg (190 lb)   Height: 1.803 m (5' 11\")   PainSc: 0-No pain      BMI:Body mass index is 26.5 kg/m².   General Cardiology:  General Appearance: Alert, oriented and in no acute distress.  HEENT: extra ocular movements intact (EOMI), pupils equal,  round, reactive to light and accommodation (PERRLA).  Carotid Upstroke: no bruit, normal.  Jugular Venous Distention (JVD): flat.  Chest: normal.  Lungs: Clear to auscultation,   Heart Sounds: no S3 or S4, normal S1, S2, regular rate.  Murmur, Click, Gallop: soft systolic murmur.  Abdomen: no hepatomegaly, no masses felt, soft.  Extremities: no leg edema.  Peripheral pulses: 2 plus bilateral.  NEUROLOGY Cranial nerves II-XII grossly intact.     Last Labs:  CMP:  Recent Labs     05/12/25  0529 05/11/25  1430 05/11/25  0601    132* 137   K 4.0 3.4* 3.2*    99 101   CO2 25 25 28   ANIONGAP 12 11 11   BUN 7 9 8   CREATININE 0.49* 0.74 0.67   EGFR >90 >90 >90   GLUCOSE 140* 183* 124*       Recent Labs     05/12/25  0529 05/11/25  1430 05/11/25  0601 05/07/25  0455 05/06/25  0728 05/05/25  1552 01/03/25  1255   ALBUMIN 2.7* 3.2* 2.6*   < > 3.2* 3.7 4.2   ALKPHOS  --   --   --   --  99 100 56   ALT  --   --   --   --  24 37 15   AST  --   --   --   --  17 28 15   BILITOT  --   --   --   --  0.8 0.7 0.4    < > = values in this " interval not displayed.       CBC:  Recent Labs     05/12/25  0529 05/11/25  0601 05/08/25  0029   WBC 8.4 10.0 10.1   HGB 11.2* 11.8* 12.3*   HCT 32.8* 34.4* 34.8*    268 186   MCV 88 87 86       COAG:   Recent Labs     05/06/25  2135   INR 1.1       HEME/ENDO:  Recent Labs     05/06/25  0728 01/13/25  1348 01/03/25  1255 09/04/24  1607 09/06/23  1441 01/18/22  1115 05/24/21  1446 11/05/20  1401   TSH  --   --  1.41  --   --  1.45  --  0.70   HGBA1C 6.8* 7.1*  --  8.9*   < >  --    < > 8.3*    < > = values in this interval not displayed.        CARDIAC:   Recent Labs     05/05/25  1732 05/05/25  1552   TROPHS 4 5       Recent Labs     01/03/25  1255 09/06/23  1441 12/01/21  1234   CHOL 113 139 163   LDLCALC 23 Unable to report calculated LDL due to increased triglycerides. Direct Unable to report calculated LDL due to increased triglycerides. Direct   HDL 36.8 40* 33*   TRIG 266* 486* 545*         Last Cardiology Tests:  Echo:  Echo Results:  Transthoracic Echo (TTE) Complete 06/11/2024    [place] [id number]  Phone [phone]    TRANSTHORACIC ECHOCARDIOGRAM REPORT      Patient Name:      PENNY Cook Physician:    [id number] Jimmy Dudley MD  Study Date:        6/11/2024            Ordering Provider:    [id number] SHIRAZ RUSSELL  MRN/PID:           [id number]             Fellow:  Accession#:        [id number]         Nurse:  Date of Birth/Age: 1965 / 58 years Sonographer:          Vinicius Sweet RDCS  Gender:            M                    Additional Staff:  Height:            178.00 cm            Admit Date:  Weight:            96.00 kg             Admission Status:     Outpatient  BSA / BMI:         2.14 m2 / 30.30      Department Location:  [facility]  kg/m2  Blood Pressure: 109 /68 mmHg    Study Type:    TRANSTHORACIC ECHO (TTE) COMPLETE  Diagnosis/ICD: Shortness of breath-R06.02  Indication:    SOB  CPT Codes:     Echo  Complete w Full Doppler-21196    Patient History:  Pertinent History: HTN, Hyperlipidemia, CAD, Chest Pain, CHF and LE Edema. PVD,  HFrEF 50.    Study Detail: The following Echo studies were performed: 2D, M-Mode, Doppler and  color flow.      PHYSICIAN INTERPRETATION:  Left Ventricle: Left ventricular systolic function is normal, with an estimated ejection fraction of 60-65%. There are no regional wall motion abnormalities. The left ventricular cavity size is normal. Spectral Doppler shows a normal pattern of left ventricular diastolic filling.  Left Atrium: The left atrium is normal in size.  Right Ventricle: The right ventricle is normal in size. There is normal right ventricular global systolic function.  Right Atrium: The right atrium is normal in size.  Aortic Valve: The aortic valve appears structurally normal. There is no evidence of aortic valve regurgitation. The peak instantaneous gradient of the aortic valve is 4.6 mmHg. The mean gradient of the aortic valve is 2.1 mmHg.  Mitral Valve: The mitral valve is normal in structure. There is no evidence of mitral valve regurgitation.  Tricuspid Valve: The tricuspid valve is structurally normal. There is trace tricuspid regurgitation. The Doppler estimated RVSP is within normal limits at 27.6 mmHg.  Pulmonic Valve: The pulmonic valve is structurally normal. There is physiologic pulmonic valve regurgitation.  Pericardium: There is no pericardial effusion noted.  Aorta: The aortic root is normal.  Systemic Veins: The inferior vena cava appears to be of normal size.      CONCLUSIONS:  1. Left ventricular systolic function is normal with a 60-65% estimated ejection fraction.  2. No evidence of mitral valve regurgitation.  3. RVSP within normal limits.  4. Trace tricuspid regurgitation is visualized.    QUANTITATIVE DATA SUMMARY:  2D MEASUREMENTS:  Normal Ranges:  Ao Root s:     2.98 cm  IVSd:          0.86 cm   (0.6-1.1cm)  LVPWd:         0.84 cm    (0.6-1.1cm)  LVIDd:         4.49 cm   (3.9-5.9cm)  LVIDs:         2.88 cm  LV Mass Index: 57.6 g/m2  LV % FS        35.9 %    LA VOLUME:  Normal Ranges:  LA Volume Index: 20.4 ml/m2  LA Vol A4C:      40.6 ml  LA Vol A2C:      32.7 ml    RA VOLUME BY A/L METHOD:  Normal Ranges:  RA Area A4C: 12.4 cm2    LV SYSTOLIC FUNCTION BY 2D PLANIMETRY (MOD):  Normal Ranges:  EF-A4C View: 59.9 % (>=55%)  EF-A2C View: 58.7 %  EF-Biplane:  59.8 %    LV DIASTOLIC FUNCTION:  Normal Ranges:  MV Peak E:    0.88 m/s (0.7-1.2 m/s)  MV Peak A:    0.68 m/s (0.42-0.7 m/s)  E/A Ratio:    1.29     (1.0-2.2)  MV e'         0.12 m/s (>8.0)  MV lateral e' 0.13 m/s  MV medial e'  0.11 m/s  E/e' Ratio:   7.32     (<8.0)    MITRAL VALVE:  Normal Ranges:  MV DT: 170 msec (150-240msec)    AORTIC VALVE:  Normal Ranges:  AoV Vmax:                1.07 m/s (<=1.7m/s)  AoV Peak P.6 mmHg (<20mmHg)  AoV Mean P.1 mmHg (1.7-11.5mmHg)  LVOT Max Gavin:            1.13 m/s (<=1.1m/s)  AoV VTI:                 22.52 cm (18-25cm)  LVOT VTI:                24.10 cm  LVOT Diameter:           2.00 cm  (1.8-2.4cm)  AoV Area, VTI:           3.37 cm2 (2.5-5.5cm2)  AoV Area,Vmax:           3.33 cm2 (2.5-4.5cm2)  AoV Dimensionless Index: 1.07      RIGHT VENTRICLE:  RV Basal 3.50 cm  RV Mid   2.60 cm  RV Major 6.4 cm    TRICUSPID VALVE/RVSP:  Normal Ranges:  Peak TR Velocity: 2.48 m/s  RV Syst Pressure: 27.6 mmHg (< 30mmHg)  IVC Diam:         1.83 cm    PULMONIC VALVE:  Normal Ranges:  PV Max Gavin: 0.9 m/s  (0.6-0.9m/s)  PV Max P.9 mmHg  PV Mean P.6 mmHg  PV VTI:     14.68 cm      [id number] Jimmy Dudley MD  Electronically signed on 2024 at 1:04:13 PM        ** Final **     Cath:  Stress Test:  Stress Results:  No results found for this or any previous visit from the past 365 days.     Cardiac Imaging:    Problem List Items Addressed This Visit     Peripheral vascular disease    Mixed hyperlipidemia    Relevant Medications     rosuvastatin (Crestor) 40 mg tablet    Essential hypertension    Relevant Medications    atenoloL-chlorthalidone (Tenoretic 50) 50-25 mg tablet    Coronary artery disease - Primary    Relevant Medications    atenoloL-chlorthalidone (Tenoretic 50) 50-25 mg tablet    rosuvastatin (Crestor) 40 mg tablet    Tobacco use disorder    Type 2 diabetes mellitus with obesity (Multi)      60-year-old patient with history of hypertension, hyperlipidemia and diabetes type 2.  Multiple comorbid condition.  Echocardiogram was done last year essentially showed underlying normal ejection fraction with ejection fraction about 60 to 65% with trace tricuspid insufficiency underlying diabetes as well as a smoking.  Hemoglobin A1c is getting better.  1.  Essential hypertension: Continue current Tenoretic.  No dizziness lightheadedness with the low blood pressure.  Currently not on the losartan due to remain hypotensive or low blood pressure.  2.  Mixed hyperlipidemia: Continue current Crestor or rosuvastatin 40 mg tablet p.o. daily.  3.  Type 2 diabetes: Continue current Mounjaro as well as glycemic control.  4.  Smoking cessation: Smoking cessation counseling was performed.  The patient was counseled on the harms of smoking, including increased risk for lung disease, cardiovascular disease and cancer. In  the context of the disease, smoking is associated with a greater pain, worse joint damage, and worse response to biological therapy.  About 11 to 15 minute on smoking cessation and counseling to patient and family.    Advised patient to avoid lunch meats, canned soups, pizzas, bread rolls, and sandwiches. Advised patient to limit salt intake 1,500 mg daily. Advised patient to exercise 30 mins/3 times a week including treadmill or aerobic type, Goal to achieve 65% target HR.    Diet and exercise reviewed with patient..advice to walk about 10,000 steps or about 2 hours during day time. Cut back on salt, sugar and flour.    Pt. care time  is spent includes independent review of diagnostic tests, labs, radiographs, EKGs and coordination of care. Assessment, impression and plans are reflected in the note above as well as the orders.    Mika Cavanaugh MD  [facility]  Patient was identified as a fall risk. Risk prevention instructions provided.

## 2025-07-09 ENCOUNTER — HOME CARE VISIT (OUTPATIENT)
Dept: HOME HEALTH SERVICES | Facility: HOME HEALTH | Age: 60
End: 2025-07-09
Payer: MEDICARE

## 2025-07-09 VITALS
RESPIRATION RATE: 16 BRPM | HEART RATE: 72 BPM | DIASTOLIC BLOOD PRESSURE: 58 MMHG | OXYGEN SATURATION: 99 % | TEMPERATURE: 98.8 F | SYSTOLIC BLOOD PRESSURE: 110 MMHG

## 2025-07-09 DIAGNOSIS — M54.42 CHRONIC BILATERAL LOW BACK PAIN WITH SCIATICA, SCIATICA LATERALITY UNSPECIFIED: ICD-10-CM

## 2025-07-09 DIAGNOSIS — G89.29 CHRONIC BILATERAL LOW BACK PAIN WITH SCIATICA, SCIATICA LATERALITY UNSPECIFIED: ICD-10-CM

## 2025-07-09 DIAGNOSIS — M54.41 CHRONIC BILATERAL LOW BACK PAIN WITH SCIATICA, SCIATICA LATERALITY UNSPECIFIED: ICD-10-CM

## 2025-07-09 PROCEDURE — G0300 HHS/HOSPICE OF LPN EA 15 MIN: HCPCS | Mod: HHH

## 2025-07-09 RX ORDER — NAPROXEN 500 MG/1
TABLET ORAL
Qty: 60 TABLET | Refills: 11 | Status: SHIPPED | OUTPATIENT
Start: 2025-07-09

## 2025-07-09 RX ORDER — ICOSAPENT ETHYL 1000 MG/1
CAPSULE ORAL
Qty: 360 CAPSULE | Refills: 3 | Status: SHIPPED | OUTPATIENT
Start: 2025-07-09

## 2025-07-09 ASSESSMENT — ENCOUNTER SYMPTOMS
APPETITE LEVEL: GOOD
CHANGE IN APPETITE: UNCHANGED
LAST BOWEL MOVEMENT: 67394
DENIES PAIN: 1

## 2025-07-12 ENCOUNTER — HOME CARE VISIT (OUTPATIENT)
Dept: HOME HEALTH SERVICES | Facility: HOME HEALTH | Age: 60
End: 2025-07-12
Payer: MEDICARE

## 2025-07-12 VITALS
SYSTOLIC BLOOD PRESSURE: 122 MMHG | DIASTOLIC BLOOD PRESSURE: 50 MMHG | WEIGHT: 191 LBS | BODY MASS INDEX: 26.74 KG/M2 | HEIGHT: 71 IN | TEMPERATURE: 98.5 F | OXYGEN SATURATION: 96 % | HEART RATE: 82 BPM | RESPIRATION RATE: 20 BRPM

## 2025-07-12 PROCEDURE — G0299 HHS/HOSPICE OF RN EA 15 MIN: HCPCS | Mod: HHH

## 2025-07-12 ASSESSMENT — ENCOUNTER SYMPTOMS
PAIN LOCATION - PAIN FREQUENCY: WITH ACTIVITY
PAIN LOCATION - EXACERBATING FACTORS: WALKING OR STANDING
PAIN LOCATION - PAIN SEVERITY: 6/10
LOSS OF SENSATION IN FEET: 1
MUSCLE WEAKNESS: 1
SUBJECTIVE PAIN PROGRESSION: UNCHANGED
DYSPNEA ACTIVITY LEVEL: AFTER AMBULATING MORE THAN 20 FT
PAIN: 1
LOWEST PAIN SEVERITY IN PAST 24 HOURS: 0/10
DIZZINESS: 1
LAST BOWEL MOVEMENT: 67397
DEPRESSION: 0
CHEST PAIN QUALITY: ACHING
LIMITED RANGE OF MOTION: 1
OCCASIONAL FEELINGS OF UNSTEADINESS: 1
SHORTNESS OF BREATH: 1
PAIN SEVERITY GOAL: 0/10
PAIN LOCATION - PAIN DURATION: VARIES
DYSPNEA ON EXERTION: 1
HIGHEST PAIN SEVERITY IN PAST 24 HOURS: 8/10
CHANGE IN APPETITE: UNCHANGED
APPETITE LEVEL: FAIR
PAIN LOCATION - PAIN QUALITY: ACHING
PERSON REPORTING PAIN: PATIENT
PAIN LOCATION: BACK

## 2025-07-12 ASSESSMENT — LIFESTYLE VARIABLES: SMOKING_STATUS: 1

## 2025-07-12 ASSESSMENT — ACTIVITIES OF DAILY LIVING (ADL)
AMBULATION ASSISTANCE: 1
GROOMING_CURRENT_FUNCTION: INDEPENDENT
GROOMING ASSESSED: 1
TOILETING: 1
LIGHT HOUSEKEEPING: NEEDS ASSISTANCE
ORAL_CARE_CURRENT_FUNCTION: INDEPENDENT
DRESSING_LB_CURRENT_FUNCTION: INDEPENDENT
FEEDING: INDEPENDENT
TRANSPORTATION ASSESSED: 1
LAUNDRY ASSESSED: 1
FEEDING ASSESSED: 1
USING THE TELPHONE: INDEPENDENT
PREPARING MEALS: NEEDS ASSISTANCE
DRESSING_UB_CURRENT_FUNCTION: INDEPENDENT
BATHING_CURRENT_FUNCTION: SUPERVISION
TELEPHONE USE ASSESSED: 1
LAUNDRY: NEEDS ASSISTANCE
SHOPPING ASSESSED: 1
TOILETING: INDEPENDENT
AMBULATION ASSISTANCE: SUPERVISION
TRANSPORTATION: NEEDS ASSISTANCE
HOUSEKEEPING ASSESSED: 1
ENTERING_EXITING_HOME: SUPERVISION
BATHING ASSESSED: 1
SHOPPING: NEEDS ASSISTANCE
OASIS_M1830: 05
CURRENT_FUNCTION: INDEPENDENT
PHYSICAL TRANSFERS ASSESSED: 1
ORAL_CARE_ASSESSED: 1

## 2025-07-14 ENCOUNTER — APPOINTMENT (OUTPATIENT)
Dept: ENDOCRINOLOGY | Facility: CLINIC | Age: 60
End: 2025-07-14
Payer: MEDICARE

## 2025-07-14 VITALS
WEIGHT: 188 LBS | HEART RATE: 88 BPM | DIASTOLIC BLOOD PRESSURE: 71 MMHG | SYSTOLIC BLOOD PRESSURE: 92 MMHG | BODY MASS INDEX: 26.22 KG/M2

## 2025-07-14 DIAGNOSIS — E78.2 MIXED HYPERLIPIDEMIA: ICD-10-CM

## 2025-07-14 DIAGNOSIS — Z79.4 TYPE 2 DIABETES MELLITUS WITH HYPERGLYCEMIA, WITH LONG-TERM CURRENT USE OF INSULIN: Primary | ICD-10-CM

## 2025-07-14 DIAGNOSIS — I10 ESSENTIAL HYPERTENSION: ICD-10-CM

## 2025-07-14 DIAGNOSIS — E11.65 TYPE 2 DIABETES MELLITUS WITH HYPERGLYCEMIA, WITH LONG-TERM CURRENT USE OF INSULIN: Primary | ICD-10-CM

## 2025-07-14 LAB — POC HEMOGLOBIN A1C: 6.7 % (ref 4.2–6.5)

## 2025-07-14 PROCEDURE — 99214 OFFICE O/P EST MOD 30 MIN: CPT | Performed by: PHARMACIST

## 2025-07-14 PROCEDURE — 95251 CONT GLUC MNTR ANALYSIS I&R: CPT | Performed by: INTERNAL MEDICINE

## 2025-07-14 PROCEDURE — 83036 HEMOGLOBIN GLYCOSYLATED A1C: CPT | Performed by: PHARMACIST

## 2025-07-14 PROCEDURE — 99214 OFFICE O/P EST MOD 30 MIN: CPT | Performed by: INTERNAL MEDICINE

## 2025-07-14 RX ORDER — TIRZEPATIDE 15 MG/.5ML
15 INJECTION, SOLUTION SUBCUTANEOUS
Qty: 6 ML | Refills: 1 | Status: SHIPPED | OUTPATIENT
Start: 2025-07-14

## 2025-07-14 RX ORDER — TIRZEPATIDE 12.5 MG/.5ML
12.5 INJECTION, SOLUTION SUBCUTANEOUS
Qty: 2 ML | Refills: 0 | Status: SHIPPED | OUTPATIENT
Start: 2025-07-14

## 2025-07-14 RX ORDER — TRAMADOL HYDROCHLORIDE 100 MG/1
100 TABLET, EXTENDED RELEASE ORAL NIGHTLY
COMMUNITY

## 2025-07-14 ASSESSMENT — PAIN SCALES - GENERAL: PAINLEVEL_OUTOF10: 6

## 2025-07-14 ASSESSMENT — ENCOUNTER SYMPTOMS
LOSS OF SENSATION IN FEET: 1
DEPRESSION: 0

## 2025-07-14 NOTE — PATIENT INSTRUCTIONS
Increase Mounjaro 12.5mg once weekly for 4 weeks,  then increase to 15mg weekly thereafter     Continue Lantus 90 units daily for now  -decrease 5 unit intervals every 3 days as needed for morning sugars dropping below 100     Continue Novolog 10 units before meals for now  -decrease 5 unit intervals every 3 days as needed for daytime sugars dropping below 100

## 2025-07-14 NOTE — PROGRESS NOTES
HPI     59 yo with dm2 (dx in 30's, highest wt 250 lbs), htn, dyslipidemia, cvd, fatty liver disease, migraines.  A1c-7.1% last visit,  6.7% today.     Taking Lantus 90 (was to decr from 80 to 75u last visit) units qhs, novolog 10 units at meals, Mounjaro 10mg weekly (incr w/ pcp since last visit, tolerating).  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    Taking crestor 40 mg and rx fish oil for lipids.     Taking atenolol/chlorthalidone 100/25mg, losartan 25mg, and prn nitroglycerin for cvd/htn.  Follows regularly with cardiology.      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, following closely with wound care/getting home health care       Current Medications[1]    Allergies as of 07/14/2025 - Reviewed 07/14/2025   Allergen Reaction Noted    Varenicline Other 08/24/2023    Metformin hcl Other and Unknown 08/24/2023    Tramadol Other and Nausea/vomiting 08/24/2023       BP 92/71 (BP Location: Right arm, Patient Position: Sitting, BP Cuff Size: Adult)   Pulse 88   Wt 85.3 kg (188 lb)   BMI 26.22 kg/m²     Labs:   Lab Results   Component Value Date    WBC 8.4 05/12/2025    NRBC 0.0 05/12/2025    RBC 3.74 (L) 05/12/2025    HGB 11.2 (L) 05/12/2025    HCT 32.8 (L) 05/12/2025     05/12/2025     Lab Results   Component Value Date    CALCIUM 7.7 (L) 05/12/2025    AST 17 05/06/2025    ALKPHOS 99 05/06/2025    BILITOT 0.8 05/06/2025    PROT 5.7 (L) 05/06/2025    ALBUMIN 2.7 (L) 05/12/2025    GLOB 2.7 09/06/2023    AGR 1.7 09/06/2023     05/12/2025    K 4.0 05/12/2025     05/12/2025    CO2 25 05/12/2025    ANIONGAP 12 05/12/2025    BUN 7 05/12/2025    CREATININE 0.49 (L) 05/12/2025    UREACREAUR 17.8 09/06/2023    GLUCOSE 140 (H) 05/12/2025    ALT 24 05/06/2025     EGFR >90 05/12/2025     Lab Results   Component Value Date    CHOL 113 01/03/2025    TRIG 266 (H) 01/03/2025    HDL 36.8 01/03/2025    LDLCALC 23 01/03/2025     Lab Results   Component Value Date    MICROALBCREA  01/03/2025      Comment:      One or more analytes used in this calculation is outside of the analytical measurement range. Calculation cannot be performed.     Lab Results   Component Value Date    TSH 1.41 01/03/2025     Lab Results   Component Value Date    KCQLVOHE30 378 01/03/2025     Lab Results   Component Value Date    HGBA1C 6.7 (A) 07/14/2025         Assessment/Plan   1. Type 2 diabetes mellitus with hyperglycemia, with long-term current use of insulin (Primary)    -A1c ordered & reviewed  -labs reviewed  -dexcom data reviewed, scanned into epic     -continue to uptitrate mounjaro, 12.5mg x1mon then 15mg weekly thereafter   -stressed importance of stopping medication & calling office if any constipation in light of current wound/gangrene     -titrate basal insulin 5u intervals q3d for am sugars <100  -titrate bolus insulin 5u intervals q3d for daytime sugars <100    2. Essential hypertension  -at target on therapy     3. Mixed hyperlipidemia  -on statin, tolerating       Follow up: 6mon bb    -labs/tests/notes reviewed  -reviewed and counseled patient on medication monitoring and side effects    Treatment and plan discussed with Dr. Sexton.  FILIBERTO Carter, PharmD, BC-ADM, [facility] [place].     Medical Decision Making  Complexity of problem: Chronic illness of diabetes mellitus uncontrolled, progressing  Data analyzed and reviewed: Reviewed prior notes, blood glucose data, labs including HgbA1c, lipids, serum chemistries.  Ordered tests.   Risk of complications and morbidities: Is definite because of use of insulin and risk of hypoglycemia.  Prescription medications reviewed and modifications made.  Compliance assessed.  Addressed social determinants of health including food insecurity.         [1]   Current  "Outpatient Medications:     albuterol 90 mcg/actuation inhaler, INHALE 2 PUFFS EVERY 4 HOURS AS NEEDED, Disp: 18 g, Rfl: 3    aspirin 81 mg EC tablet, Take 1 tablet (81 mg) by mouth once daily. For 30 days, Disp: , Rfl:     atenoloL-chlorthalidone (Tenoretic 50) 50-25 mg tablet, Take 1 tablet by mouth once daily., Disp: 90 tablet, Rfl: 3    DULoxetine (Cymbalta) 60 mg DR capsule, Take 1 capsule (60 mg) by mouth once daily. For 30 days, Disp: 90 capsule, Rfl: 1     mg tablet, TAKE 1 TABLET THREE TIMES DAILY AS NEEDED. TAKE WITH FOOD OR MILK., Disp: 270 tablet, Rfl: 3    insulin aspart (NovoLOG Flexpen U-100 Insulin) 100 unit/mL (3 mL) pen, INJECT UP TO 50 UNITS UNDER THE SKIN DAILY AS DIRECTED. DISCARD PEN 28 DAYS AFTER OPENING, Disp: 45 mL, Rfl: 3    Lantus Solostar U-100 Insulin 100 unit/mL (3 mL) pen, INJECT 90 UNITS UNDER THE SKIN ONCE DAILY AT BEDTIME., Disp: 90 mL, Rfl: 3    methocarbamol (Robaxin) 750 mg tablet, TAKE 1 TABLET EVERY 4 HOURS AS NEEDED, Disp: 270 tablet, Rfl: 3    naproxen (Naprosyn) 500 mg tablet, TAKE 1 TABLET TWICE DAILY (MORNING AND LATE AFTERNOON), Disp: 60 tablet, Rfl: 11    nitroglycerin (Nitrostat) 0.4 mg SL tablet, Place 1 tablet (0.4 mg) under the tongue every 5 minutes if needed for chest pain (If pain persists for more than 15 minutes, CALL 911!)., Disp: 25 tablet, Rfl: 1    omeprazole (PriLOSEC) 40 mg DR capsule, Take 1 capsule (40 mg) by mouth once daily., Disp: 90 capsule, Rfl: 1    pen needle 1/2\" (BD Ultra-Fine Orig Pen Needle) 29G X 12mm needle, Use as instructed, Disp: 100 each, Rfl: 3    phenytoin ER (Dilantin) 100 mg capsule, TAKE 1 CAPSULE EVERY DAY, Disp: 90 capsule, Rfl: 3    QUEtiapine (SEROquel) 100 mg tablet, Take 1 tablet (100 mg) by mouth once daily at bedtime. For 30 days, Disp: 90 tablet, Rfl: 1    rosuvastatin (Crestor) 40 mg tablet, Take 1 tablet (40 mg) by mouth once daily., Disp: 90 tablet, Rfl: 3    SUMAtriptan (Imitrex) 50 mg tablet, Take 1 tablet (50 " mg) by mouth once daily as needed. At least 2 hours between doses for 60 days, Disp: , Rfl:     tamsulosin (Flomax) 0.4 mg 24 hr capsule, TAKE 2 CAPSULES ONE TIME DAILY, Disp: 180 capsule, Rfl: 3    tirzepatide (Mounjaro) 10 mg/0.5 mL pen injector, Inject 10 mg under the skin 1 (one) time per week., Disp: 6 mL, Rfl: 0    traZODone (Desyrel) 100 mg tablet, TAKE 1 TABLET AT BEDTIME AS NEEDED FOR SLEEP, Disp: 90 tablet, Rfl: 1    Vascepa 1 gram capsule, TAKE 2 CAPSULES TWICE DAILY (MORNING AND LATE AFTERNOON), Disp: 360 capsule, Rfl: 3    traMADol ER (Ultram-ER) 100 mg 24 hr tablet, Take 1 tablet (100 mg) by mouth once daily at bedtime. Do not crush, chew, or split., Disp: , Rfl:

## 2025-07-14 NOTE — PROGRESS NOTES
Attestation signed by Jorge Sexton MD on 7/14/25 at 3:12 PM.    I, Dr Jorge Sexton, have reviewed this progress note, medication list, vital signs, any pertinent lab values, and any CGM data if present with the [facility] and  face to face during this visit today. This note reflects the treatment plan that was made under my direction after reviewing the above mentioned elements while face to face with the patient and CDE.  I personally answered and addressed any questions and concerns the patient had during the visit today.  The CDE entered the data in this note under my direction and I personally reviewed it, signed any lab or medication orders that I instructed to be completed. I am the billing provider for this visit and the level of service was determined by my involvement in the Medical Decision Making Component of this visit while face to face with the patient.

## 2025-07-15 ENCOUNTER — HOME CARE VISIT (OUTPATIENT)
Dept: HOME HEALTH SERVICES | Facility: HOME HEALTH | Age: 60
End: 2025-07-15
Payer: MEDICARE

## 2025-07-15 VITALS
TEMPERATURE: 98.1 F | DIASTOLIC BLOOD PRESSURE: 72 MMHG | HEART RATE: 78 BPM | RESPIRATION RATE: 16 BRPM | OXYGEN SATURATION: 98 % | SYSTOLIC BLOOD PRESSURE: 110 MMHG

## 2025-07-15 PROCEDURE — G0300 HHS/HOSPICE OF LPN EA 15 MIN: HCPCS | Mod: HHH

## 2025-07-15 ASSESSMENT — ENCOUNTER SYMPTOMS
LAST BOWEL MOVEMENT: 67400
APPETITE LEVEL: GOOD
DENIES PAIN: 1
CHANGE IN APPETITE: UNCHANGED

## 2025-07-18 ENCOUNTER — HOME CARE VISIT (OUTPATIENT)
Dept: HOME HEALTH SERVICES | Facility: HOME HEALTH | Age: 60
End: 2025-07-18
Payer: MEDICARE

## 2025-07-18 VITALS
RESPIRATION RATE: 16 BRPM | TEMPERATURE: 98 F | SYSTOLIC BLOOD PRESSURE: 100 MMHG | HEART RATE: 72 BPM | OXYGEN SATURATION: 100 % | DIASTOLIC BLOOD PRESSURE: 62 MMHG

## 2025-07-18 PROCEDURE — G0300 HHS/HOSPICE OF LPN EA 15 MIN: HCPCS | Mod: HHH

## 2025-07-18 ASSESSMENT — ENCOUNTER SYMPTOMS
CHANGE IN APPETITE: UNCHANGED
LAST BOWEL MOVEMENT: 67403
DENIES PAIN: 1
APPETITE LEVEL: GOOD

## 2025-07-22 ENCOUNTER — HOME CARE VISIT (OUTPATIENT)
Dept: HOME HEALTH SERVICES | Facility: HOME HEALTH | Age: 60
End: 2025-07-22
Payer: MEDICARE

## 2025-07-22 VITALS
HEART RATE: 94 BPM | TEMPERATURE: 98.3 F | RESPIRATION RATE: 16 BRPM | OXYGEN SATURATION: 98 % | SYSTOLIC BLOOD PRESSURE: 106 MMHG | DIASTOLIC BLOOD PRESSURE: 60 MMHG

## 2025-07-22 PROCEDURE — G0300 HHS/HOSPICE OF LPN EA 15 MIN: HCPCS | Mod: HHH

## 2025-07-22 ASSESSMENT — ENCOUNTER SYMPTOMS
LAST BOWEL MOVEMENT: 67403
DENIES PAIN: 1
CHANGE IN APPETITE: UNCHANGED
APPETITE LEVEL: GOOD

## 2025-07-25 ENCOUNTER — HOME CARE VISIT (OUTPATIENT)
Dept: HOME HEALTH SERVICES | Facility: HOME HEALTH | Age: 60
End: 2025-07-25
Payer: MEDICARE

## 2025-07-25 VITALS
DIASTOLIC BLOOD PRESSURE: 64 MMHG | HEART RATE: 76 BPM | RESPIRATION RATE: 16 BRPM | OXYGEN SATURATION: 98 % | SYSTOLIC BLOOD PRESSURE: 100 MMHG | TEMPERATURE: 98.1 F

## 2025-07-25 PROCEDURE — G0300 HHS/HOSPICE OF LPN EA 15 MIN: HCPCS | Mod: HHH

## 2025-07-25 ASSESSMENT — ENCOUNTER SYMPTOMS
DENIES PAIN: 1
LAST BOWEL MOVEMENT: 67410
CHANGE IN APPETITE: UNCHANGED
APPETITE LEVEL: GOOD

## 2025-07-29 ENCOUNTER — HOME CARE VISIT (OUTPATIENT)
Dept: HOME HEALTH SERVICES | Facility: HOME HEALTH | Age: 60
End: 2025-07-29
Payer: MEDICARE

## 2025-07-29 VITALS
OXYGEN SATURATION: 99 % | RESPIRATION RATE: 16 BRPM | SYSTOLIC BLOOD PRESSURE: 98 MMHG | HEART RATE: 91 BPM | TEMPERATURE: 98.8 F | DIASTOLIC BLOOD PRESSURE: 62 MMHG

## 2025-07-29 PROCEDURE — G0300 HHS/HOSPICE OF LPN EA 15 MIN: HCPCS | Mod: HHH

## 2025-07-29 ASSESSMENT — ENCOUNTER SYMPTOMS
CHANGE IN APPETITE: UNCHANGED
LAST BOWEL MOVEMENT: 67415
DENIES PAIN: 1
APPETITE LEVEL: GOOD

## 2025-07-31 ENCOUNTER — HOSPITAL ENCOUNTER (OUTPATIENT)
Dept: RADIOLOGY | Facility: CLINIC | Age: 60
Discharge: HOME | End: 2025-07-31
Payer: MEDICARE

## 2025-07-31 ENCOUNTER — OFFICE VISIT (OUTPATIENT)
Dept: PRIMARY CARE | Facility: CLINIC | Age: 60
End: 2025-07-31
Payer: MEDICARE

## 2025-07-31 VITALS
OXYGEN SATURATION: 99 % | HEART RATE: 87 BPM | BODY MASS INDEX: 25.9 KG/M2 | DIASTOLIC BLOOD PRESSURE: 62 MMHG | WEIGHT: 185 LBS | HEIGHT: 71 IN | SYSTOLIC BLOOD PRESSURE: 116 MMHG

## 2025-07-31 DIAGNOSIS — R07.81 RIB PAIN ON LEFT SIDE: Primary | ICD-10-CM

## 2025-07-31 DIAGNOSIS — I20.9 ANGINA PECTORIS: ICD-10-CM

## 2025-07-31 DIAGNOSIS — R07.81 RIB PAIN ON LEFT SIDE: ICD-10-CM

## 2025-07-31 DIAGNOSIS — R07.9 CHEST PAIN, UNSPECIFIED TYPE: ICD-10-CM

## 2025-07-31 PROCEDURE — 71111 X-RAY EXAM RIBS/CHEST4/> VWS: CPT | Performed by: RADIOLOGY

## 2025-07-31 PROCEDURE — 3078F DIAST BP <80 MM HG: CPT | Performed by: FAMILY MEDICINE

## 2025-07-31 PROCEDURE — 3008F BODY MASS INDEX DOCD: CPT | Performed by: FAMILY MEDICINE

## 2025-07-31 PROCEDURE — G2211 COMPLEX E/M VISIT ADD ON: HCPCS | Performed by: FAMILY MEDICINE

## 2025-07-31 PROCEDURE — 99213 OFFICE O/P EST LOW 20 MIN: CPT | Performed by: FAMILY MEDICINE

## 2025-07-31 PROCEDURE — 3044F HG A1C LEVEL LT 7.0%: CPT | Performed by: FAMILY MEDICINE

## 2025-07-31 PROCEDURE — 3074F SYST BP LT 130 MM HG: CPT | Performed by: FAMILY MEDICINE

## 2025-07-31 PROCEDURE — 71111 X-RAY EXAM RIBS/CHEST4/> VWS: CPT

## 2025-07-31 RX ORDER — METHYLPREDNISOLONE 4 MG/1
TABLET ORAL
Qty: 21 TABLET | Refills: 0 | Status: SHIPPED | OUTPATIENT
Start: 2025-07-31 | End: 2025-07-31

## 2025-07-31 RX ORDER — NITROGLYCERIN 0.4 MG/1
TABLET SUBLINGUAL
Qty: 25 TABLET | Refills: 11 | Status: SHIPPED | OUTPATIENT
Start: 2025-07-31

## 2025-07-31 RX ORDER — METHYLPREDNISOLONE 4 MG/1
TABLET ORAL
Qty: 21 TABLET | Refills: 0 | Status: SHIPPED | OUTPATIENT
Start: 2025-07-31 | End: 2025-08-06

## 2025-07-31 ASSESSMENT — PAIN SCALES - GENERAL: PAINLEVEL_OUTOF10: 8

## 2025-07-31 NOTE — PROGRESS NOTES
60-year presents to clinic for new complaints      Rib pain:  States that around 2 weeks ago he was reaching out with his left arm to grab something and felt a popping sensation in the lower anterior left ribs.  It has been hurting since.  Does hurt when he takes deep breaths.  Denied bruising or other trauma or injury to the area.  Patient is already on chronic anti-inflammatories and muscle relaxers.      All pertinent positive symptoms are included in history of present illness.    All other systems have been reviewed and are negative and noncontributory to this patient's current ailments.      CONSTITUTIONAL - INAD. Not ill appearing.  SKIN - No lesions or rashes visualized. No jaundice visualized.  HEENT- Atraumatic, normocephalic, no scleral icterus, external nares are not erythematous and without drainage, no neck masses visualized, oropharynx visualized and is without erythema or exudate  RESP - respiration not labored   CARDIAC - no grade 6 systolic murmurs auscultated  ABDOMEN - nondistended.  NEURO- CNs II-XII grossly intact  MUSCULOSKELETAL-palpation of the left anterior lateral and posterior costal cage elicits tenderness in the lower 3 ribs in the costal cartilage, no step-offs felt, no overlying ecchymosis or deformity.      1. Rib pain on left side (Primary)  Possible contusion versus sprain, highly unlikely fracture however secondary to patient's history and pain level today we will get x-ray of the ribs to evaluate for any underlying deformity.  Recommend continue anti-inflammatories and muscle relaxers as previously prescribed, will give steroid pack and trial physical therapy follow-up if not improving  - XR ribs 3 views bilateral w chest pa or ap; Future  - Referral to Physical Therapy; Future  - methylPREDNISolone (Medrol Dospak) 4 mg tablets; Take as directed on package.  Dispense: 21 tablet; Refill: 0

## 2025-08-01 ENCOUNTER — HOME CARE VISIT (OUTPATIENT)
Dept: HOME HEALTH SERVICES | Facility: HOME HEALTH | Age: 60
End: 2025-08-01
Payer: MEDICARE

## 2025-08-01 VITALS
DIASTOLIC BLOOD PRESSURE: 65 MMHG | SYSTOLIC BLOOD PRESSURE: 98 MMHG | HEART RATE: 100 BPM | RESPIRATION RATE: 16 BRPM | OXYGEN SATURATION: 100 % | TEMPERATURE: 98.1 F

## 2025-08-01 DIAGNOSIS — Z79.4 TYPE 2 DIABETES MELLITUS WITH HYPERGLYCEMIA, WITH LONG-TERM CURRENT USE OF INSULIN: ICD-10-CM

## 2025-08-01 DIAGNOSIS — E11.65 TYPE 2 DIABETES MELLITUS WITH HYPERGLYCEMIA, WITH LONG-TERM CURRENT USE OF INSULIN: ICD-10-CM

## 2025-08-01 PROCEDURE — G0300 HHS/HOSPICE OF LPN EA 15 MIN: HCPCS | Mod: HHH

## 2025-08-01 RX ORDER — TIRZEPATIDE 12.5 MG/.5ML
INJECTION, SOLUTION SUBCUTANEOUS
Qty: 6 ML | Refills: 11 | Status: SHIPPED | OUTPATIENT
Start: 2025-08-01

## 2025-08-01 ASSESSMENT — ENCOUNTER SYMPTOMS
APPETITE LEVEL: GOOD
CHANGE IN APPETITE: UNCHANGED
DENIES PAIN: 1
LAST BOWEL MOVEMENT: 67418

## 2025-08-06 ENCOUNTER — HOME CARE VISIT (OUTPATIENT)
Dept: HOME HEALTH SERVICES | Facility: HOME HEALTH | Age: 60
End: 2025-08-06
Payer: MEDICARE

## 2025-08-06 VITALS
HEART RATE: 78 BPM | OXYGEN SATURATION: 98 % | TEMPERATURE: 97.9 F | RESPIRATION RATE: 16 BRPM | SYSTOLIC BLOOD PRESSURE: 110 MMHG | DIASTOLIC BLOOD PRESSURE: 60 MMHG

## 2025-08-06 PROCEDURE — G0300 HHS/HOSPICE OF LPN EA 15 MIN: HCPCS | Mod: HHH

## 2025-08-07 ASSESSMENT — ENCOUNTER SYMPTOMS
CHANGE IN APPETITE: UNCHANGED
APPETITE LEVEL: GOOD
DENIES PAIN: 1
LAST BOWEL MOVEMENT: 67423

## 2025-08-08 ENCOUNTER — HOME CARE VISIT (OUTPATIENT)
Dept: HOME HEALTH SERVICES | Facility: HOME HEALTH | Age: 60
End: 2025-08-08
Payer: MEDICARE

## 2025-08-08 VITALS
DIASTOLIC BLOOD PRESSURE: 64 MMHG | OXYGEN SATURATION: 98 % | TEMPERATURE: 98.7 F | HEART RATE: 86 BPM | RESPIRATION RATE: 16 BRPM | SYSTOLIC BLOOD PRESSURE: 98 MMHG

## 2025-08-08 DIAGNOSIS — K21.9 GASTROESOPHAGEAL REFLUX DISEASE, UNSPECIFIED WHETHER ESOPHAGITIS PRESENT: ICD-10-CM

## 2025-08-08 DIAGNOSIS — E11.9 ALTERED METABOLISM DUE TO DIABETES (MULTI): ICD-10-CM

## 2025-08-08 PROCEDURE — G0300 HHS/HOSPICE OF LPN EA 15 MIN: HCPCS | Mod: HHH

## 2025-08-08 RX ORDER — OMEPRAZOLE 40 MG/1
40 CAPSULE, DELAYED RELEASE ORAL DAILY
Qty: 90 CAPSULE | Refills: 3 | Status: SHIPPED | OUTPATIENT
Start: 2025-08-08

## 2025-08-08 RX ORDER — PEN NEEDLE, DIABETIC 29 G X1/2"
NEEDLE, DISPOSABLE MISCELLANEOUS
Qty: 100 EACH | Refills: 3 | Status: SHIPPED | OUTPATIENT
Start: 2025-08-08

## 2025-08-08 ASSESSMENT — ENCOUNTER SYMPTOMS
DENIES PAIN: 1
LAST BOWEL MOVEMENT: 67424
CHANGE IN APPETITE: UNCHANGED
APPETITE LEVEL: GOOD

## 2025-08-12 ENCOUNTER — HOME CARE VISIT (OUTPATIENT)
Dept: HOME HEALTH SERVICES | Facility: HOME HEALTH | Age: 60
End: 2025-08-12
Payer: MEDICARE

## 2025-08-12 VITALS
DIASTOLIC BLOOD PRESSURE: 60 MMHG | RESPIRATION RATE: 16 BRPM | TEMPERATURE: 98.3 F | OXYGEN SATURATION: 97 % | HEART RATE: 82 BPM | SYSTOLIC BLOOD PRESSURE: 100 MMHG

## 2025-08-12 DIAGNOSIS — F43.10 PTSD (POST-TRAUMATIC STRESS DISORDER): ICD-10-CM

## 2025-08-12 PROCEDURE — G0300 HHS/HOSPICE OF LPN EA 15 MIN: HCPCS | Mod: HHH

## 2025-08-12 RX ORDER — PHENYTOIN SODIUM 100 MG/1
100 CAPSULE, EXTENDED RELEASE ORAL DAILY
Qty: 90 CAPSULE | Refills: 0 | Status: SHIPPED | OUTPATIENT
Start: 2025-08-12 | End: 2025-11-10

## 2025-08-12 ASSESSMENT — ENCOUNTER SYMPTOMS
DENIES PAIN: 1
APPETITE LEVEL: GOOD
CHANGE IN APPETITE: UNCHANGED
LAST BOWEL MOVEMENT: 67428

## 2025-08-14 ENCOUNTER — HOME CARE VISIT (OUTPATIENT)
Dept: HOME HEALTH SERVICES | Facility: HOME HEALTH | Age: 60
End: 2025-08-14
Payer: MEDICARE

## 2025-08-14 VITALS
HEART RATE: 76 BPM | OXYGEN SATURATION: 98 % | TEMPERATURE: 98.8 F | RESPIRATION RATE: 16 BRPM | SYSTOLIC BLOOD PRESSURE: 110 MMHG | DIASTOLIC BLOOD PRESSURE: 52 MMHG

## 2025-08-14 PROCEDURE — G0300 HHS/HOSPICE OF LPN EA 15 MIN: HCPCS | Mod: HHH

## 2025-08-14 ASSESSMENT — ENCOUNTER SYMPTOMS
LAST BOWEL MOVEMENT: 67430
APPETITE LEVEL: GOOD
CHANGE IN APPETITE: UNCHANGED
DENIES PAIN: 1

## 2025-08-18 ENCOUNTER — APPOINTMENT (OUTPATIENT)
Dept: ORTHOPEDIC SURGERY | Facility: CLINIC | Age: 60
End: 2025-08-18
Payer: MEDICARE

## 2025-08-18 DIAGNOSIS — M75.121 COMPLETE TEAR OF RIGHT ROTATOR CUFF, UNSPECIFIED WHETHER TRAUMATIC: Primary | ICD-10-CM

## 2025-08-18 PROCEDURE — 3044F HG A1C LEVEL LT 7.0%: CPT | Performed by: ORTHOPAEDIC SURGERY

## 2025-08-18 PROCEDURE — 99213 OFFICE O/P EST LOW 20 MIN: CPT | Performed by: ORTHOPAEDIC SURGERY

## 2025-08-18 ASSESSMENT — PAIN SCALES - GENERAL: PAINLEVEL_OUTOF10: 7

## 2025-08-18 ASSESSMENT — PAIN - FUNCTIONAL ASSESSMENT: PAIN_FUNCTIONAL_ASSESSMENT: 0-10

## 2025-08-19 ENCOUNTER — APPOINTMENT (OUTPATIENT)
Dept: SURGERY | Facility: CLINIC | Age: 60
End: 2025-08-19
Payer: MEDICARE

## 2025-08-19 VITALS
HEIGHT: 71 IN | WEIGHT: 188 LBS | DIASTOLIC BLOOD PRESSURE: 71 MMHG | RESPIRATION RATE: 16 BRPM | HEART RATE: 85 BPM | SYSTOLIC BLOOD PRESSURE: 105 MMHG | BODY MASS INDEX: 26.32 KG/M2

## 2025-08-19 DIAGNOSIS — Z48.89 ENCOUNTER FOR POSTOPERATIVE WOUND CARE: Primary | ICD-10-CM

## 2025-08-19 DIAGNOSIS — Z71.6 ENCOUNTER FOR SMOKING CESSATION COUNSELING: ICD-10-CM

## 2025-08-19 DIAGNOSIS — Z71.9 HEALTH EDUCATION/COUNSELING: ICD-10-CM

## 2025-08-19 PROCEDURE — 99213 OFFICE O/P EST LOW 20 MIN: CPT | Performed by: PHYSICIAN ASSISTANT

## 2025-08-19 ASSESSMENT — ENCOUNTER SYMPTOMS
SHORTNESS OF BREATH: 0
FATIGUE: 0
ARTHRALGIAS: 1
RHINORRHEA: 0
CHILLS: 0
FEVER: 0
SORE THROAT: 0
WOUND: 0
WHEEZING: 0
JOINT SWELLING: 0

## 2025-08-19 ASSESSMENT — PAIN SCALES - GENERAL: PAINLEVEL_OUTOF10: 0-NO PAIN

## 2025-08-20 ENCOUNTER — HOME CARE VISIT (OUTPATIENT)
Dept: HOME HEALTH SERVICES | Facility: HOME HEALTH | Age: 60
End: 2025-08-20
Payer: MEDICARE

## 2025-08-20 VITALS
HEART RATE: 74 BPM | SYSTOLIC BLOOD PRESSURE: 100 MMHG | DIASTOLIC BLOOD PRESSURE: 64 MMHG | OXYGEN SATURATION: 98 % | RESPIRATION RATE: 16 BRPM | TEMPERATURE: 98.6 F

## 2025-08-20 PROCEDURE — G0300 HHS/HOSPICE OF LPN EA 15 MIN: HCPCS | Mod: HHH

## 2025-08-20 ASSESSMENT — ENCOUNTER SYMPTOMS
CHANGE IN APPETITE: UNCHANGED
LAST BOWEL MOVEMENT: 67436
DENIES PAIN: 1
APPETITE LEVEL: GOOD

## 2025-08-26 ENCOUNTER — HOME CARE VISIT (OUTPATIENT)
Dept: HOME HEALTH SERVICES | Facility: HOME HEALTH | Age: 60
End: 2025-08-26
Payer: MEDICARE

## 2025-08-26 VITALS
TEMPERATURE: 98.5 F | OXYGEN SATURATION: 97 % | RESPIRATION RATE: 16 BRPM | HEART RATE: 80 BPM | DIASTOLIC BLOOD PRESSURE: 68 MMHG | SYSTOLIC BLOOD PRESSURE: 110 MMHG

## 2025-08-26 PROCEDURE — G0299 HHS/HOSPICE OF RN EA 15 MIN: HCPCS | Mod: HHH

## 2025-08-26 ASSESSMENT — ENCOUNTER SYMPTOMS
HIGHEST PAIN SEVERITY IN PAST 24 HOURS: 6/10
PAIN LOCATION - EXACERBATING FACTORS: MOVEMENTS
PAIN LOCATION - PAIN SEVERITY: 6/10
PAIN LOCATION: BACK
SUBJECTIVE PAIN PROGRESSION: UNCHANGED
PAIN LOCATION - PAIN QUALITY: ACHE
PERSON REPORTING PAIN: PATIENT
PAIN LOCATION - PAIN FREQUENCY: CONSTANT
PAIN LOCATION - PAIN DURATION: CONSTANT
PAIN: 1

## 2025-08-26 ASSESSMENT — ACTIVITIES OF DAILY LIVING (ADL)
AMBULATION ASSISTANCE: 1
HOME_HEALTH_OASIS: 00
AMBULATION ASSISTANCE: INDEPENDENT
OASIS_M1830: 00

## 2025-09-04 ENCOUNTER — APPOINTMENT (OUTPATIENT)
Facility: CLINIC | Age: 60
End: 2025-09-04
Payer: MEDICARE

## 2025-10-01 ENCOUNTER — APPOINTMENT (OUTPATIENT)
Dept: PRIMARY CARE | Facility: CLINIC | Age: 60
End: 2025-10-01
Payer: MEDICARE

## 2025-12-22 ENCOUNTER — APPOINTMENT (OUTPATIENT)
Dept: ORTHOPEDIC SURGERY | Facility: CLINIC | Age: 60
End: 2025-12-22
Payer: MEDICARE

## 2026-01-14 ENCOUNTER — APPOINTMENT (OUTPATIENT)
Facility: CLINIC | Age: 61
End: 2026-01-14
Payer: MEDICARE

## (undated) DEVICE — CATHETER TRAY, FOLEY, ALOETOUCH, 16FR, 10ML, W/ DRAINBAG

## (undated) DEVICE — COVER, CART, 45 X 27 X 48 IN, CLEAR

## (undated) DEVICE — DRAPE, PAD, PREP, W/ 9 IN CUFF, 24 X 41, LF, NS

## (undated) DEVICE — Device

## (undated) DEVICE — GOWN, SURGICAL, SMARTGOWN, XLARGE, STERILE

## (undated) DEVICE — SUCTION TIP , YANKAUER, W/BULB SUCTION

## (undated) DEVICE — DRESSING, ABDOMINAL, TENDERSORB, 8 X 10 IN, STERILE

## (undated) DEVICE — DRAPE, SHEET, FAN FOLDED, HALF, 44 X 58 IN, DISPOSABLE, LF, STERILE

## (undated) DEVICE — BRIEF, STRETCH, XXXLARGE, MESH PANTS

## (undated) DEVICE — PREP TRAY, SKIN, DRY, W/GLOVES

## (undated) DEVICE — MANIFOLD, 4 PORT NEPTUNE STANDARD

## (undated) DEVICE — BANDAGE, GAUZE, CONFORMING, KERLIX, 6 PLY, 4.5 IN X 4.1 YD

## (undated) DEVICE — CAUTERY, PENCIL, PUSH BUTTON, SMOKE EVAC, 70MM